# Patient Record
Sex: FEMALE | Race: WHITE | HISPANIC OR LATINO | Employment: PART TIME | URBAN - METROPOLITAN AREA
[De-identification: names, ages, dates, MRNs, and addresses within clinical notes are randomized per-mention and may not be internally consistent; named-entity substitution may affect disease eponyms.]

---

## 2017-02-10 ENCOUNTER — ALLSCRIPTS OFFICE VISIT (OUTPATIENT)
Dept: OTHER | Facility: OTHER | Age: 62
End: 2017-02-10

## 2017-02-10 DIAGNOSIS — M54.50 LOW BACK PAIN: ICD-10-CM

## 2017-04-14 ENCOUNTER — ALLSCRIPTS OFFICE VISIT (OUTPATIENT)
Dept: OTHER | Facility: OTHER | Age: 62
End: 2017-04-14

## 2017-04-14 ENCOUNTER — APPOINTMENT (OUTPATIENT)
Dept: LAB | Facility: CLINIC | Age: 62
End: 2017-04-14
Payer: COMMERCIAL

## 2017-04-14 DIAGNOSIS — R15.9 FULL INCONTINENCE OF FECES: ICD-10-CM

## 2017-04-14 LAB
ALBUMIN SERPL BCP-MCNC: 3.7 G/DL (ref 3.5–5)
ALP SERPL-CCNC: 90 U/L (ref 46–116)
ALT SERPL W P-5'-P-CCNC: 23 U/L (ref 12–78)
ANION GAP SERPL CALCULATED.3IONS-SCNC: 7 MMOL/L (ref 4–13)
AST SERPL W P-5'-P-CCNC: 14 U/L (ref 5–45)
BILIRUB SERPL-MCNC: 0.44 MG/DL (ref 0.2–1)
BUN SERPL-MCNC: 25 MG/DL (ref 5–25)
CALCIUM SERPL-MCNC: 9.1 MG/DL (ref 8.3–10.1)
CHLORIDE SERPL-SCNC: 106 MMOL/L (ref 100–108)
CO2 SERPL-SCNC: 27 MMOL/L (ref 21–32)
CREAT SERPL-MCNC: 0.67 MG/DL (ref 0.6–1.3)
ERYTHROCYTE [DISTWIDTH] IN BLOOD BY AUTOMATED COUNT: 13.4 % (ref 11.6–15.1)
GFR SERPL CREATININE-BSD FRML MDRD: >60 ML/MIN/1.73SQ M
GLUCOSE P FAST SERPL-MCNC: 97 MG/DL (ref 65–99)
HCT VFR BLD AUTO: 41.1 % (ref 34.8–46.1)
HGB BLD-MCNC: 13.2 G/DL (ref 11.5–15.4)
MCH RBC QN AUTO: 28.3 PG (ref 26.8–34.3)
MCHC RBC AUTO-ENTMCNC: 32.1 G/DL (ref 31.4–37.4)
MCV RBC AUTO: 88 FL (ref 82–98)
PLATELET # BLD AUTO: 280 THOUSANDS/UL (ref 149–390)
PMV BLD AUTO: 11 FL (ref 8.9–12.7)
POTASSIUM SERPL-SCNC: 3.8 MMOL/L (ref 3.5–5.3)
PROT SERPL-MCNC: 7.7 G/DL (ref 6.4–8.2)
RBC # BLD AUTO: 4.66 MILLION/UL (ref 3.81–5.12)
SODIUM SERPL-SCNC: 140 MMOL/L (ref 136–145)
WBC # BLD AUTO: 4.73 THOUSAND/UL (ref 4.31–10.16)

## 2017-04-14 PROCEDURE — 85027 COMPLETE CBC AUTOMATED: CPT

## 2017-04-14 PROCEDURE — 80053 COMPREHEN METABOLIC PANEL: CPT

## 2017-04-24 ENCOUNTER — GENERIC CONVERSION - ENCOUNTER (OUTPATIENT)
Dept: OTHER | Facility: OTHER | Age: 62
End: 2017-04-24

## 2017-05-11 ENCOUNTER — ANESTHESIA EVENT (OUTPATIENT)
Dept: GASTROENTEROLOGY | Facility: HOSPITAL | Age: 62
End: 2017-05-11
Payer: COMMERCIAL

## 2017-05-11 ENCOUNTER — HOSPITAL ENCOUNTER (OUTPATIENT)
Facility: HOSPITAL | Age: 62
Setting detail: OUTPATIENT SURGERY
Discharge: HOME | End: 2017-05-11
Attending: INTERNAL MEDICINE | Admitting: INTERNAL MEDICINE
Payer: COMMERCIAL

## 2017-05-11 ENCOUNTER — GENERIC CONVERSION - ENCOUNTER (OUTPATIENT)
Dept: OTHER | Facility: OTHER | Age: 62
End: 2017-05-11

## 2017-05-11 ENCOUNTER — ANESTHESIA (OUTPATIENT)
Dept: GASTROENTEROLOGY | Facility: HOSPITAL | Age: 62
End: 2017-05-11
Payer: COMMERCIAL

## 2017-05-11 VITALS
HEART RATE: 68 BPM | BODY MASS INDEX: 22.99 KG/M2 | TEMPERATURE: 98.2 F | DIASTOLIC BLOOD PRESSURE: 77 MMHG | OXYGEN SATURATION: 100 % | WEIGHT: 138 LBS | RESPIRATION RATE: 16 BRPM | HEIGHT: 65 IN | SYSTOLIC BLOOD PRESSURE: 132 MMHG

## 2017-05-11 RX ORDER — PROPOFOL 10 MG/ML
INJECTION, EMULSION INTRAVENOUS CONTINUOUS PRN
Status: DISCONTINUED | OUTPATIENT
Start: 2017-05-11 | End: 2017-05-11 | Stop reason: SURG

## 2017-05-11 RX ORDER — PROPOFOL 10 MG/ML
INJECTION, EMULSION INTRAVENOUS AS NEEDED
Status: DISCONTINUED | OUTPATIENT
Start: 2017-05-11 | End: 2017-05-11 | Stop reason: SURG

## 2017-05-11 RX ORDER — SODIUM CHLORIDE 9 MG/ML
125 INJECTION, SOLUTION INTRAVENOUS CONTINUOUS
Status: DISCONTINUED | OUTPATIENT
Start: 2017-05-11 | End: 2017-05-11 | Stop reason: HOSPADM

## 2017-05-11 RX ADMIN — PROPOFOL 100 MG: 10 INJECTION, EMULSION INTRAVENOUS at 14:10

## 2017-05-11 RX ADMIN — PROPOFOL 100 MCG/KG/MIN: 10 INJECTION, EMULSION INTRAVENOUS at 14:10

## 2017-05-11 RX ADMIN — SODIUM CHLORIDE 125 ML/HR: 0.9 INJECTION, SOLUTION INTRAVENOUS at 12:58

## 2017-06-08 ENCOUNTER — GENERIC CONVERSION - ENCOUNTER (OUTPATIENT)
Dept: OTHER | Facility: OTHER | Age: 62
End: 2017-06-08

## 2018-01-10 NOTE — MISCELLANEOUS
Reason For Visit  Reason For Visit Free Text Note Form: Assistance with Obtaining Insurance     Case Management Documentation [facility]:   Information obtained from the patient and medical record  She is also dealing with additional issues such as chronic/terminal disease  Action Plan: supportive counseling/advocacy, information provided and Mediciane Program/Financial Counselor  plan reviewed  Progress Note  MARILEE met with this bi lingual female pt this date to assist pt with obtaining insurance  Pt relates she works but this is more limited now due other health issuers  Pt had had several ED visits and has been referred PATHS  MARILEE has assisted pt with a call to [employer] and pt does need to complete application  MARILEE has provided pt with directions and required info  Marilee encouraged pt to go to [facility] for assistance in competing same  Marilee has also referred pt to [facility] who is already trying to assist pt with medications from the [facility]  Pt also referred to [facility] for [facility] Assistance  MARILEE also spoke with pt re her Hx of depression and panic attacks  Pt reports she feels she is doing better at this time  Pt has been given the [facility] # as well as a resource for OP [address] [place] through the [facility]  Unfortunately, pt will not be able to get funding for Cosme Shaun Landa in [address] is to f/u with this SWer if additional assistance is needed  Active Problems    1  Asthma (493 90) (J45 909)   2  Bowel incontinence (787 60) (R15 9)   3  Depression (311) (F32 9)   4  Encounter for screening colonoscopy (V76 51) (Z12 11)   5  Hip pain (719 45) (M25 559)   6  Lumbar radiculopathy (724 4) (M54 16)   7  Onychomycosis (110 1) (B35 1)   8  Panic disorder (300 01) (F41 0)   9  Sciatica (724 3) (M54 30)   10  Seasonal allergies (477 9) (J30 2)   11  Shortness of breath (786 05) (R06 02)   12  Viral URI (465 9) (J06 9,B97 89)    Current Meds   1   Acetaminophen-Codeine #3 300-30 MG Oral Tablet; TAKE 1 TABLET TWICE DAILY AS   NEEDED FOR PAIN;   Therapy: 69Mqo7916 to Recorded   2  Cyclobenzaprine HCl - 10 MG Oral Tablet; TAKE 1 TABLET AT BEDTIME AS NEEDED; Therapy: 36SAG8384 to (Evaluate:30Jun2016)  Requested for: 11VWA4490 Recorded   3  Flovent  MCG/ACT Inhalation Aerosol; INHALE 1 PUFF TWICE DAILY; Therapy: 20Sff4725 to (Last Rx:63Ddo0603)  Requested for: 27Fye0902 Ordered   4  Loratadine 10 MG Oral Tablet; TAKE 1 TABLET DAILY; Therapy: 48WAM7055 to (Jessica Silverman)  Requested for: 81SAA3273; Last   Rx:11Mar2016 Ordered   5  Naproxen 375 MG Oral Tablet; TAKE 1 TABLET EVERY 12 HOURS AS NEEDED; Therapy: 20Dqd2893 to (Evaluate:61Fkn1429)  Requested for: 92Xlr1630; Last   Rx:60Glx6651 Ordered   6  PARoxetine HCl - 20 MG Oral Tablet; TAKE 1 AND 1/2 TABLETS DAILY IN AM;   Therapy: 54BPA6308 to (Evaluate:14Jan2017)  Requested for: 58RTS8646; Last   Rx:22Rho4370 Ordered   7  Terbinafine HCl - 250 MG Oral Tablet; TAKE 1 TABLET DAILY; Therapy: 01Rjf7576 to (Evaluate:21Oct2016)  Requested for: 23Jjo2095; Last   Rx:20Ooo9256 Ordered   8  TraMADol HCl - 50 MG Oral Tablet; take 50 mg daily; Therapy: 09Zhq4939 to (Evaluate:24Wla5840); Last Rx:71Tvf8713 Ordered    Allergies    1  No Known Drug Allergies    Future Appointments    Date/Time Provider Specialty Site   09/23/2016 09:15 AM GLADIS Garcias   Internal Medicine [address] [place] [address] PCP     Signatures   Electronically signed by : Evita Cruz LCSW; Sep 12 2016  1:16PM EST                       (Author)

## 2018-01-12 VITALS
TEMPERATURE: 97.3 F | DIASTOLIC BLOOD PRESSURE: 72 MMHG | HEART RATE: 72 BPM | WEIGHT: 138.89 LBS | HEIGHT: 65 IN | SYSTOLIC BLOOD PRESSURE: 112 MMHG | BODY MASS INDEX: 23.14 KG/M2

## 2018-01-14 VITALS
TEMPERATURE: 97.7 F | HEART RATE: 64 BPM | WEIGHT: 140.65 LBS | SYSTOLIC BLOOD PRESSURE: 108 MMHG | DIASTOLIC BLOOD PRESSURE: 74 MMHG | HEIGHT: 65 IN | BODY MASS INDEX: 23.43 KG/M2

## 2018-01-14 NOTE — RESULT NOTES
Verified Results  (1) COMPREHENSIVE METABOLIC PANEL 52TXB2735 30:63JR Laura Andrade Order Number: [id number]    TW Order Number: [id number] Order Number: [id number]  National Kidney Disease Education Program recommendations are as follows:  GFR calculation is accurate only with a steady state creatinine  Chronic Kidney disease less than 60 ml/min/1 73 sq  meters  Kidney failure less than 15 ml/min/1 73 sq  meters  Test Name Result Flag Reference   GLUCOSE,RANDM 97 mg/dL     If the patient is fasting, the ADA then defines impaired fasting glucose as > 100 mg/dL and diabetes as > or equal to 123 mg/dL     SODIUM 138 mmol/L  136-145   POTASSIUM 4 0 mmol/L  3 5-5 3   CHLORIDE 104 mmol/L  100-108   CARBON DIOXIDE 31 mmol/L  21-32   ANION GAP (CALC) 3 mmol/L L 4-13   BLOOD UREA NITROGEN 22 mg/dL  5-25   CREATININE 0 65 mg/dL  0 60-1 30   Standardized to IDMS reference method   CALCIUM 8 1 mg/dL L 8 3-10 1   BILI, TOTAL 0 33 mg/dL  0 20-1 00   ALK PHOSPHATAS 98 U/L     ALT (SGPT) 34 U/L  12-78   AST(SGOT) 12 U/L  5-45   ALBUMIN 3 6 g/dL  3 5-5 0   TOTAL PROTEIN 7 9 g/dL  6 4-8 2   eGFR Non-African American      >60 0 ml/min/1 73sq m

## 2018-01-15 NOTE — PROGRESS NOTES
Assessment    1  Depression (311) (F32 9)   2  Sciatica (724 3) (M54 30)   3  Onychomycosis (110 1) (B35 1)   4  Asthma (493 90) (J45 909)    Plan  Asthma    · Flovent  MCG/ACT Inhalation Aerosol; INHALE 1 PUFF TWICE DAILY   · Follow-up visit in 1 month Evaluation and Treatment  Follow-up  Status: Hold For - Scheduling   Requested for: 99Yvg9212  Onychomycosis    · Terbinafine HCl - 250 MG Oral Tablet; TAKE 1 TABLET DAILY  Sciatica    · Naproxen 375 MG Oral Tablet; TAKE 1 TABLET EVERY 12 HOURS AS NEEDED   · TraMADol HCl - 50 MG Oral Tablet; take 50 mg daily   · *1 - [address] Physician Referral  Consult  Status: Active  Requested for:  19Wcz6641  Care Summary provided  : Yes    Discussion/Summary  Discussion Summary:   1  Depression   Patient is adherent to medication and states that her symptoms have improved over the past month  She does complain of difficulty sleeping which is likely secondary to her sciatic pain and not her depression  Will focus on controlling her sciatic pain which will likely help to alleviate her depressive symptoms as well  2  Sciatica   Patient has a history of sciatica for which she takes 50mg Tramadol (prescibed by PT) daily for her pain as well as Cyclobenzaprine  She states that the daily pain management works for the day and helps reduce her pain to a 4-5/10 from an 8/10 though she does not have any pain coverage in the evening which has made her sleeping difficult  She sees PT weekly for her pain and states that this does help, though she does not feel her pain is well controlled at the moment  She has also been told by pain management that she can no longer receive injections in her back for her pain  Will send her to [facility] and Pain management, will add naproxen and reorder 30 day supply of tramadol 50mg daily  3  Onychomycosis   Patient has fungal infection of the right 1-3 toes as well as the left 1st toe   Will order terbutaline 250mg for 6 weeks for management  4  Asthma  Patient complains of daily evening cough requiring frequent albuterol use  Physical examination shows clear lungs bilaterally to auscultation, but patient took her albuterol his morning  She 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, though at home she denies any dust, smoking, or pet exposure  She likely is now in the moderate persistent category for asthma and would benefit from the addition of inhaled corticosteroid and will task [facility] for medication and reassessment in 4 weeks  Counseling Documentation With Imm: The patient was counseled regarding instructions for management, risk factor reductions, impressions, importance of compliance with treatment  Immunization Counseling The parent/guardian was counseled on the following vaccine components:  total time of encounter was 35 minutes  Medication SE Review and Pt Understands Tx: Possible side effects of new medications were reviewed with the patient/guardian today  Chief Complaint  Chief Complaint Free Text Note Form: Patient is here for follow up of her chronic medical conditions   Chief Complaint Chronic Condition [facility]: Patient is here today for follow up of chronic conditions described in HPI  History of Present Illness  HPI: Ms Harini Paredes is a 61year old female here in clinic today for follow up of her depression over the past month as well as hip pain that radiates down her right leg to her toes, especially when leaning forward  She states that this pain is strong and affects her throughout the day where she needs to take Tramadol to help with her pain  She states that the pain limits her ability to work as well as limits her ability to sleep comfortably  Hospital Based Practices Required Assessment:   Pain Assessment   the patient states they have pain  The pain is located in the lower back  The pain radiates to the down her right leg to her toes   The patient describes the pain as sharp  (on a scale of 0 to 10, the patient rates the pain at 8 )   Abuse And Domestic Violence Screen    Yes, the patient is safe at home  The patient states no one is hurting them  Depression And Suicide Screen  No, the patient has not had thoughts of hurting themself  Yes, the patient has felt depressed in the past 7 days  Prefered Language is  Uzbek  Primary Language is  Uzbek  Depression (Initial):     Depression (Follow-Up): The patient states her depression has been stable since the last visit  She describes this as mild  Comorbid Illnesses: anxiety  Interval Symptoms: denies depression, stable depressed mood, stable loss of interest or pleasure in activities, denies insomnia, denies excessive sleepiness, denies inability to perform normal activities, denies loss of energy, denies feelings of worthlessness and denies feelings of guilt  Associated symptoms include:  No associated symptoms are reported  Social Support: Sister whom lives outside [facility], [address] [place] [address] is limited secondary to sciatica, but the patient has good social support  Medications: the patient is adherent with her medication regimen  Sciatica (Brief):     Sciatica, Chronic (Brief): The patient is being seen for a routine clinic follow-up of chronic sciatica  The injury involved the lumbosacral area  This condition is Fall  This occurred at home  The injury resulted from a fall  Symptoms:  back pain, lower leg pain, leg numbness, paresthesias and back stiffness, but no buttock pain and no leg weakness  Associated symptoms:  no sexual dysfunction, no rash localized to the area of pain and no fever  Current treatment includes prescribed exercises  By report, there is fair compliance with treatment, fair tolerance of treatment and poor symptom control  (Patient has been seen by PT whom she sees 1-2 times per week whom give her exercises as well as warm/cold patches for pain   She states that this helps on the day of PT  She also takes 50mg Tramadol daily which PT was prescribing as well as 10mg Cyclobenzaprine for muscle relaxation  MRI from April 2016 showed mild disc degeneration in lumbar spine as well as some areas of disc protrusion in the lumbar spine  She was receiving disc injections in the past for which she was told she is no longer a candidate for)   Allergy Evaluation (Brief): Asthma (Follow-Up): The patient is being seen for a routine clinic follow-up of asthma  The patient's long-term asthma pattern has been classified as moderate persistent  Interval Events: Patient complains of SOB and coughing every evening which requires albterol every day  Interval Symptoms:   worsened wheezing, worsened coughing, denies chest tightness and denies reduced exercise tolerance  Associated symptoms include awakened at night because of cough, but no chest pain or discomfort and not awakened at night because of wheezing or trouble breathing  More frequent rescue inhaler use of 2-3 time(s) a day  Lifestyle: Diet: She consumes a diverse and healthy diet  Weight Issues: She does not have any weight concerns  Exercise: She exercises regularly  Smoking: She does not use tobacco Alcohol: She denies alcohol use  Interval Triggers: None  Review of Systems  Complete-Female:   Constitutional: No fever, no chills, feels well, no tiredness, no recent weight gain or weight loss  Eyes: No complaints of eye pain, no red eyes, no eyesight problems, no discharge, no dry eyes, no itching of eyes  Cardiovascular: No complaints of slow heart rate, no fast heart rate, no chest pain, no palpitations, no leg claudication, no lower extremity edema  Active Problems    1  Asthma (493 90) (J45 909)   2  Bowel incontinence (787 60) (R15 9)   3  Depression (311) (F32 9)   4  Encounter for screening colonoscopy (V76 51) (Z12 11)   5  Hip pain (719 45) (M25 559)   6  Lumbar radiculopathy (724 4) (M54 16)   7   Panic disorder (300 01) (F41 0) 8  Sciatica (724 3) (M54 30)   9  Seasonal allergies (477 9) (J30 2)   10  Shortness of breath (786 05) (R06 02)   11  Viral URI (465 9) (J06 9,B97 89)    Past Medical History    1  History of breast implant (V43 82) (M91 40)  Active Problems And Past Medical History Reviewed: The active problems and past medical history were reviewed and updated today  Surgical History    1  History of Breast Surgery Enlargement Procedure  Surgical History Reviewed: The surgical history was reviewed and updated today  Family History  Mother    1  Family history of Lung cancer  Sister    2  Family history of Asthma  Family History Reviewed: The family history was reviewed and updated today  Social History    · Financial status inadequate or marginal   · Never a smoker   · Single  Social History Reviewed: The social history was reviewed and updated today  The social history was reviewed and is unchanged  Current Meds   1  Acetaminophen-Codeine #3 300-30 MG Oral Tablet; TAKE 1 TABLET TWICE DAILY AS NEEDED FOR   PAIN;   Therapy: 20Iku1070 to Recorded   2  Cyclobenzaprine HCl - 10 MG Oral Tablet; TAKE 1 TABLET AT BEDTIME AS NEEDED; Therapy: 55FMS7832 to (Evaluate:30Jun2016)  Requested for: 96NCR5840 Recorded   3  Loratadine 10 MG Oral Tablet; TAKE 1 TABLET DAILY; Therapy: 39KLX6970 to (Leonardo Tom)  Requested for: 51GBW9490; Last Rx:11Mar2016   Ordered   4  PARoxetine HCl - 20 MG Oral Tablet; TAKE 1 AND 1/2 TABLETS DAILY IN AM;   Therapy: 22AKU6939 to (Evaluate:14Jan2017)  Requested for: 97GDN2840; Last Rx:30Lyl8661   Ordered  Medication List Reviewed: The medication list was reviewed and updated today  Allergies    1   No Known Drug Allergies    Vitals  Vital Signs    Recorded: 21HNJ7500 90:86EV   Systolic 748   Diastolic 80   Heart Rate 74   Temperature 98 1 F   Height 5 ft 4 in   Weight 136 lb 10 94 oz   BMI Calculated 23 46   BSA Calculated 1 66     Physical Exam    Constitutional General appearance: No acute distress, well appearing and well nourished  Eyes   Conjunctiva and lids: No swelling, erythema or discharge  Pupils and irises: Equal, round and reactive to light  Pulmonary   Respiratory effort: No increased work of breathing or signs of respiratory distress  Auscultation of lungs: Clear to auscultation  clear to auscultation bilaterally  Cardiovascular   Palpation of heart: Normal PMI, no thrills  Auscultation of heart: Normal rate and rhythm, normal S1 and S2, without murmurs  Abdomen   Abdomen: Non-tender, no masses  Lymphatic   Palpation of lymph nodes in neck: No lymphadenopathy  Musculoskeletal   Gait and station: Abnormal   Gait evaluation demonstrated Limited gait secondary to lower back/sciatic pain  Skin   Skin and subcutaneous tissue: Normal without rashes or lesions  Neurologic   Sensation: No sensory loss  Attending Note  Attending Note: Attending Note: I interviewed and examined the patient, I discussed the case with the Resident and reviewed the Resident's note, I supervised the Resident and I agree with the Resident management plan as it was presented to me  Level of Participation: I was present in clinic and examined the patient  Patient's History:   Agree with above  Has chronic R back/sciatica, into distal R foot  Wanted PM referral as spinal injections very helpful in past Her asthma is poorly controlled with daily JYOTSNA use and nocturnal sx  Depression is improved  Has a new job  Key Parts of the Exam:   Alert, appears comfortable  She had pain in R foot with extension of her R leg while seated  COR-RR@ 80/min  LUNG-clear  Ext- onychomycosis of all toes  Diagnosis and Plan:   As noted  Has $ barrier to care, but the terbinafine is on $[facility]  We will have her see Moni for assist on the Flovent   Refer to [facility] as her current provider would not give her any more injections and she wanted another look at this        Signatures   Electronically signed by : GLADIS Candelaria ; Aug 22 2016 11:38AM EST                       (Author)    Electronically signed by : Madelaine Boeck, DO; Aug 22 2016 11:57AM EST                       (Co-participant)

## 2018-01-15 NOTE — MISCELLANEOUS
Message  Patient called regarding blood work and onychomycosis  Recent CMP and CBC was with in normal limits  Terbinafine was prescribed in the past but only for 8 weeks  Usual duration is of 12 weeks  Patient is not having any side effects  Will Rx one more month of Terbinafine       EILEEN Newsome      Plan  Onychomycosis    · Terbinafine HCl - 250 MG Oral Tablet; TAKE 1 TABLET DAILY    Signatures   Electronically signed by : EILEEN Newsome DO; May  5 2017 12:17PM EST                       (Author)

## 2018-01-16 NOTE — PROGRESS NOTES
Assessment    1  Shortness of breath (786 05) (R06 02)   2  Panic disorder (300 01) (F41 0)    Plan  Panic disorder    · Escitalopram Oxalate 5 MG Oral Tablet (Lexapro); TAKE 1 TABLET DAILY   · Follow-up Visit in 4 Weeks Evaluation and Treatment  Follow-up  Status: Hold For - Scheduling   Requested for: 11XMH0947  Shortness of breath    · EKG/ECG- POC; Status:Active; Requested for:73Qny7530;     Discussion/Summary  Discussion Summary:   1  Panic Disorder - EKG in office shows NSR without signs of ischemia  Will start lexapro 5g daily and f/u in 4 weeks for further titration of dosage  I educated the patient regarding the condition and the likely time period of a few weeks before seeing the benefits of the SSRI dosage  Chief Complaint  Chief Complaint Free Text Note Form: shortness of breath      History of Present Illness  HPI: 3-6 weeks ago, she began to start feeling SOB/difficulty catching her breath/intense sense of worry that she can't breath  Happens when lying down or working  She says it happens daily  Relates it to being on tramadol, so she stopped taking it feeling it caused her to feel "hyper"  They last for roughly 2-3 minutes  She normally can feel it coming on and then resolves by itself suddenly  She also feels palpitations with this  This occasionally does wake her from her sleep roughly 3x per night  She had these symptoms previously 2 years ago which was self-limited and did not have it evaluated  Hospital Based Practices Required Assessment:   Pain Assessment   the patient states they do not have pain  (on a scale of 0 to 10, the patient rates the pain at 0 )    Depression And Suicide Screen  No, the patient has not had thoughts of hurting themself  No, the patient has not felt depressed in the past 7 days  Prefered Language is  Mauritian  Primary Language is  Mauritian  Review of Systems  Complete-Female:   Constitutional: no fever and no chills     Eyes: no eyesight problems and no purulent discharge from the eyes  ENT: no hearing loss and no nasal discharge  Cardiovascular: palpitations, but as noted in HPI and no chest pain  Respiratory: shortness of breath, but as noted in HPI and no cough  Gastrointestinal: no nausea and no vomiting  Musculoskeletal: no arthralgias and no myalgias  Integumentary: no rashes and no skin lesions  Neurological: no numbness and no tingling  Psychiatric: no anxiety and no depression  Active Problems    1  Asthma (493 90) (J45 909)   2  Hip pain (719 45) (M25 559)   3  Lumbar radiculopathy (724 4) (M54 16)   4  Sciatica (724 3) (M54 30)    Past Medical History    1  History of breast implant (V43 82) (T48 36)  Active Problems And Past Medical History Reviewed: The active problems and past medical history were reviewed and updated today  Surgical History    1  History of Breast Surgery Enlargement Procedure  Surgical History Reviewed: The surgical history was reviewed and updated today  Family History    1  Family history of Lung cancer    2  Family history of Asthma  Family History Reviewed: The family history was reviewed and updated today  Social History    · Never a smoker  Social History Reviewed: The social history was reviewed and updated today  The social history was reviewed and is unchanged  Current Meds   1  Cyclobenzaprine HCl - 10 MG Oral Tablet; TAKE 1 TABLET AT BEDTIME AS NEEDED; Therapy: 81BDB8535 to (Evaluate:72Ebk0330)  Requested for: 86BCH9365 Recorded  Medication List Reviewed: The medication list was reviewed and updated today  Allergies    1   No Known Drug Allergies    Vitals  Vital Signs [Data Includes: Current Encounter]    Recorded: 51LOI1952 12:12PM   Temperature 98 F   Heart Rate 80   Systolic 739   Diastolic 72   Height 5 ft 4 in   Weight 138 lb 14 22 oz   BMI Calculated 23 84   BSA Calculated 1 68     Physical Exam    Constitutional   General appearance: No acute distress, well appearing and well nourished  Eyes   Conjunctiva and lids: No swelling, erythema or discharge  Pupils and irises: Equal, round and reactive to light  Ears, Nose, Mouth, and Throat   Oropharynx: Normal with no erythema, edema, exudate or lesions  Pulmonary   Respiratory effort: No increased work of breathing or signs of respiratory distress  Auscultation of lungs: Clear to auscultation  Cardiovascular   Palpation of heart: Normal PMI, no thrills  Auscultation of heart: Normal rate and rhythm, normal S1 and S2, without murmurs  Examination of extremities for edema and/or varicosities: Normal     Neurologic   Cranial nerves: Cranial nerves 2-12 intact  Sensation: No sensory loss  Psychiatric   Orientation to person, place, and time: Normal     Mood and affect: Normal          Attending Note  Attending Note: Attending Note: I discussed the case with the Resident and reviewed the Resident's note, I supervised the Resident and I agree with the Resident management plan as it was presented to me  Level of Participation: I was present in clinic, but did not examine the patient  Comments/Additional Findings: Will address HM issues next time  I agree with the Resident's note        Signatures   Electronically signed by : Annamarie German DO; Feb 1 2016  1:03PM EST                       (Author)    Electronically signed by : Jonel Hernández DO; Feb 1 2016  1:10PM EST                       (Co-author)

## 2018-04-26 ENCOUNTER — APPOINTMENT (EMERGENCY)
Dept: RADIOLOGY | Facility: HOSPITAL | Age: 63
End: 2018-04-26

## 2018-04-26 ENCOUNTER — HOSPITAL ENCOUNTER (EMERGENCY)
Facility: HOSPITAL | Age: 63
Discharge: HOME | End: 2018-04-26
Attending: EMERGENCY MEDICINE | Admitting: EMERGENCY MEDICINE

## 2018-04-26 VITALS
TEMPERATURE: 98.5 F | WEIGHT: 140 LBS | BODY MASS INDEX: 23.3 KG/M2 | DIASTOLIC BLOOD PRESSURE: 93 MMHG | HEART RATE: 81 BPM | RESPIRATION RATE: 16 BRPM | OXYGEN SATURATION: 100 % | SYSTOLIC BLOOD PRESSURE: 154 MMHG

## 2018-04-26 DIAGNOSIS — S99.912A INJURY OF LEFT ANKLE, INITIAL ENCOUNTER: Primary | ICD-10-CM

## 2018-04-26 PROCEDURE — 73630 X-RAY EXAM OF FOOT: CPT

## 2018-04-26 PROCEDURE — 99283 EMERGENCY DEPT VISIT LOW MDM: CPT

## 2018-04-26 PROCEDURE — 73610 X-RAY EXAM OF ANKLE: CPT

## 2018-04-26 RX ORDER — IBUPROFEN 600 MG/1
600 TABLET ORAL ONCE
Status: COMPLETED | OUTPATIENT
Start: 2018-04-26 | End: 2018-04-26

## 2018-04-26 RX ORDER — NAPROXEN 500 MG/1
500 TABLET ORAL 2 TIMES DAILY WITH MEALS
Qty: 30 TABLET | Refills: 0 | Status: SHIPPED | OUTPATIENT
Start: 2018-04-26 | End: 2018-07-19

## 2018-04-26 RX ADMIN — IBUPROFEN 600 MG: 600 TABLET ORAL at 21:16

## 2018-04-27 NOTE — ED PROVIDER NOTES
History  Chief Complaint   Patient presents with    Ankle Injury     pt states she rolled her ankle around 1400 today, has been bearing weight at work but states the pain has gotten worse     59-year-old female presenting to the ER today with a chief complaint of right ankle pain  History provided by:  Patient   used: No    Ankle Injury   Location:  Right ankle   Quality:  Ache   Severity:  Mild  Onset quality:  Gradual  Timing:  Constant  Progression:  Worsening  Chronicity:  New  Context:  Twisted while walking at home   Relieved by:  Rest   Worsened by:  Ambulation and weight-bearing  Associated symptoms: myalgias    Associated symptoms: no abdominal pain, no chest pain, no congestion, no cough, no diarrhea, no ear pain, no fatigue, no fever, no headaches, no loss of consciousness, no nausea, no rash, no rhinorrhea, no shortness of breath, no sore throat, no vomiting and no wheezing        Prior to Admission Medications   Prescriptions Last Dose Informant Patient Reported? Taking? PARoxetine (PAXIL) 10 mg tablet   Yes No   Sig: Take 10 mg by mouth   PARoxetine (PAXIL) 30 mg tablet   Yes No   Sig: Take 1 tablet by mouth daily   PEG 3350-KCl-NaBcb-NaCl-NaSulf (GOLYTELY) 227 1 g PACK   Yes No   Sig: Take by mouth   PREDNISONE, HANNA, PO   Yes No   Sig: Take by mouth Tapering   TRAMADOL HCL PO   Yes No   Sig: Take by mouth Takes daily   acetaminophen-codeine (TYLENOL WITH CODEINE) 120-12 mg/5 mL suspension   Yes No   Sig: Take 5 mL by mouth   albuterol (PROVENTIL HFA,VENTOLIN HFA) 90 mcg/act inhaler   Yes No   Sig: Inhale 2 puffs every 6 (six) hours as needed for wheezing   amitriptyline (ELAVIL) 25 mg tablet   Yes No   Sig: Take 1 tablet by mouth   bisacodyl (DULCOLAX) 5 mg EC tablet   Yes No   Sig: Take by mouth   cetirizine-pseudoephedrine (ZyrTEC-D) 5-120 MG per tablet   No No   Sig: Take 1 tablet by mouth 2 (two) times a day     cyclobenzaprine (FLEXERIL) 10 mg tablet   Yes No   Sig: Take 1 tablet by mouth   docusate sodium (COLACE) 100 mg capsule   Yes No   Sig: Take 1 capsule by mouth 2 (two) times a day as needed   fluticasone (FLOVENT HFA) 110 MCG/ACT inhaler   Yes No   Sig: Inhale 1 puff 2 (two) times a day   loratadine (CLARITIN) 10 mg tablet   Yes No   Sig: Take 1 tablet by mouth daily   naproxen (NAPROSYN) 500 mg tablet   No No   Sig: Take 1 tablet (500 mg total) by mouth 2 (two) times a day with meals  polyethylene glycol (GLYCOLAX) powder   Yes No   Sig: Take by mouth   polyethylene glycol (MIRALAX) 17 g packet   Yes No   Sig: Take 1 packet by mouth daily   predniSONE 20 mg tablet   No No   Sig: Take 3 tablets by mouth daily   terbinafine (LamISIL) 250 mg tablet   Yes No   Sig: Take 1 tablet by mouth daily      Facility-Administered Medications: None       Past Medical History:   Diagnosis Date    Asthma     Back pain     Bowel incontinence     Chronic pain     History of breast implant     Psychiatric disorder     panic attacks       Past Surgical History:   Procedure Laterality Date    AUGMENTATION MAMMAPLASTY      BREAST SURGERY      lift    VA COLONOSCOPY FLX DX W/COLLJ SPEC WHEN PFRMD N/A 5/11/2017    Procedure: COLONOSCOPY;  Surgeon: Ryley Fernandez MD;  Location: [facility]; Service: Gastroenterology       Family History   Problem Relation Age of Onset    Lung cancer Mother     Asthma Sister      I have reviewed and agree with the history as documented  Social History   Substance Use Topics    Smoking status: Never Smoker    Smokeless tobacco: Never Used    Alcohol use No        Review of Systems   Constitutional: Negative  Negative for appetite change, chills, diaphoresis, fatigue and fever  HENT: Negative  Negative for congestion, ear pain, rhinorrhea and sore throat  Eyes: Negative  Respiratory: Negative  Negative for cough, shortness of breath and wheezing  Cardiovascular: Negative  Negative for chest pain     Gastrointestinal: Negative for abdominal pain, blood in stool, constipation, diarrhea, nausea and vomiting  Endocrine: Negative  Genitourinary: Negative for decreased urine volume, difficulty urinating, dyspareunia, dysuria, flank pain, frequency, hematuria, pelvic pain, urgency, vaginal bleeding, vaginal discharge and vaginal pain  Musculoskeletal: Positive for myalgias  Skin: Negative  Negative for rash  Allergic/Immunologic: Negative  Neurological: Negative  Negative for loss of consciousness and headaches  Psychiatric/Behavioral: Negative  All other systems reviewed and are negative  Physical Exam  ED Triage Vitals [04/26/18 2051]   Temperature Pulse Respirations Blood Pressure SpO2   98 5 °F (36 9 °C) 81 16 154/93 100 %      Temp Source Heart Rate Source Patient Position - Orthostatic VS BP Location FiO2 (%)   Oral -- -- -- --      Pain Score       Worst Possible Pain           Orthostatic Vital Signs  Vitals:    04/26/18 2051   BP: 154/93   Pulse: 81       Physical Exam   Constitutional: She is oriented to person, place, and time  She appears well-developed and well-nourished  HENT:   Head: Normocephalic and atraumatic  Right Ear: External ear normal    Left Ear: External ear normal    Nose: Nose normal    Mouth/Throat: Oropharynx is clear and moist    Eyes: Conjunctivae and EOM are normal  Pupils are equal, round, and reactive to light  Neck: Normal range of motion  Neck supple  No JVD present  No tracheal deviation present  No thyromegaly present  Cardiovascular: Normal rate, regular rhythm, normal heart sounds and intact distal pulses  Exam reveals no gallop and no friction rub  No murmur heard  Pulmonary/Chest: Effort normal and breath sounds normal  No stridor  No respiratory distress  She has no wheezes  She has no rales  She exhibits no tenderness  Abdominal: Soft  Bowel sounds are normal  She exhibits no distension and no mass  There is no tenderness  There is no rebound and no guarding   No hernia  Musculoskeletal: Normal range of motion  She exhibits tenderness  She exhibits no edema or deformity  Left ankle: Tenderness  Lateral malleolus and head of 5th metatarsal tenderness found  Feet:    Lymphadenopathy:     She has no cervical adenopathy  Neurological: She is alert and oriented to person, place, and time  She has normal reflexes  She displays normal reflexes  No cranial nerve deficit  She exhibits normal muscle tone  Coordination normal    Skin: Skin is warm  No rash noted  No erythema  No pallor  Psychiatric: She has a normal mood and affect  Her behavior is normal  Judgment and thought content normal    Nursing note and vitals reviewed  ED Medications  Medications   ibuprofen (MOTRIN) tablet 600 mg (600 mg Oral Given 4/26/18 2116)       Diagnostic Studies  Results Reviewed     None                 XR ankle 3+ views LEFT   Final Result by Obdulio Jansen DO (04/26 2152)      No acute osseous abnormality  Workstation performed: [id number]         XR foot 3+ views LEFT   Final Result by Obdulio Jansen DO (04/26 2151)      No acute osseous abnormality  Workstation performed: [id number]               Procedures  Procedures      Phone Consults  ED Phone Contact    ED Course                               MDM  Number of Diagnoses or Management Options  Diagnosis management comments: Assessment:  -ankle pain status post fall  Plan:  -for pain over the base of the 5th metatarsal as well as lateral malleolus    X-ray to rule out fracture, ice, Motrin   -no acute abnormality noted on x-rays will place patient in air cast with crutches  -will give Sports Med follow-up       Amount and/or Complexity of Data Reviewed  Clinical lab tests: ordered and reviewed  Tests in the radiology section of CPT®: ordered and reviewed  Tests in the medicine section of CPT®: reviewed and ordered  Decide to obtain previous medical records or to obtain history from someone other than the patient: yes  Independent visualization of images, tracings, or specimens: yes    Patient Progress  Patient progress: stable    CritCare Time    Disposition  Final diagnoses:   Injury of left ankle, initial encounter     Time reflects when diagnosis was documented in both MDM as applicable and the Disposition within this note     Time User Action Codes Description Comment    4/26/2018  9:57 PM Jose Ware Add [G44 [id number]] Injury of left ankle, initial encounter       ED Disposition     ED Disposition Condition Comment    Discharge  Yaniv Sanchez discharge to home/self care  Condition at discharge: Good        Follow-up Information     Follow up With Specialties Details Why Contact Info    [employer]  Schedule an appointment as soon as possible for a visit  [phone]          Patient's Medications   Discharge Prescriptions    NAPROXEN (NAPROSYN) 500 MG TABLET    Take 1 tablet (500 mg total) by mouth 2 (two) times a day with meals       Start Date: 4/26/2018 End Date: --       Order Dose: 500 mg       Quantity: 30 tablet    Refills: 0     No discharge procedures on file  ED Provider  Attending physically available and evaluated Yaniv Sanchez I managed the patient along with the ED Attending      Electronically Signed by         Ozzy Fraser DO  04/26/18 4708

## 2018-07-19 ENCOUNTER — APPOINTMENT (EMERGENCY)
Dept: RADIOLOGY | Facility: HOSPITAL | Age: 63
End: 2018-07-19

## 2018-07-19 ENCOUNTER — HOSPITAL ENCOUNTER (EMERGENCY)
Facility: HOSPITAL | Age: 63
Discharge: HOME | End: 2018-07-19
Attending: EMERGENCY MEDICINE | Admitting: EMERGENCY MEDICINE

## 2018-07-19 VITALS
TEMPERATURE: 98.3 F | DIASTOLIC BLOOD PRESSURE: 82 MMHG | SYSTOLIC BLOOD PRESSURE: 137 MMHG | OXYGEN SATURATION: 96 % | RESPIRATION RATE: 18 BRPM | HEART RATE: 70 BPM

## 2018-07-19 DIAGNOSIS — M54.6 ACUTE THORACIC BACK PAIN, UNSPECIFIED BACK PAIN LATERALITY: ICD-10-CM

## 2018-07-19 DIAGNOSIS — R51.9 NONINTRACTABLE HEADACHE, UNSPECIFIED CHRONICITY PATTERN, UNSPECIFIED HEADACHE TYPE: Primary | ICD-10-CM

## 2018-07-19 DIAGNOSIS — N39.0 UTI (URINARY TRACT INFECTION): ICD-10-CM

## 2018-07-19 LAB
ALBUMIN SERPL BCP-MCNC: 3.7 G/DL (ref 3.5–5)
ALP SERPL-CCNC: 101 U/L (ref 46–116)
ALT SERPL W P-5'-P-CCNC: 24 U/L (ref 12–78)
ANION GAP SERPL CALCULATED.3IONS-SCNC: 4 MMOL/L (ref 4–13)
AST SERPL W P-5'-P-CCNC: 17 U/L (ref 5–45)
ATRIAL RATE: 76 BPM
BACTERIA UR QL AUTO: ABNORMAL /HPF
BASOPHILS # BLD AUTO: 0.03 THOUSANDS/ΜL (ref 0–0.1)
BASOPHILS NFR BLD AUTO: 0 % (ref 0–1)
BILIRUB SERPL-MCNC: 0.29 MG/DL (ref 0.2–1)
BILIRUB UR QL STRIP: NEGATIVE
BILIRUB UR QL STRIP: NEGATIVE
BUN SERPL-MCNC: 16 MG/DL (ref 5–25)
CALCIUM SERPL-MCNC: 8.8 MG/DL (ref 8.3–10.1)
CHLORIDE SERPL-SCNC: 107 MMOL/L (ref 100–108)
CK SERPL-CCNC: 93 U/L (ref 26–192)
CLARITY UR: CLEAR
CLARITY UR: CLEAR
CO2 SERPL-SCNC: 27 MMOL/L (ref 21–32)
COLOR UR: YELLOW
COLOR UR: YELLOW
CREAT SERPL-MCNC: 0.82 MG/DL (ref 0.6–1.3)
EOSINOPHIL # BLD AUTO: 0.13 THOUSAND/ΜL (ref 0–0.61)
EOSINOPHIL NFR BLD AUTO: 2 % (ref 0–6)
ERYTHROCYTE [DISTWIDTH] IN BLOOD BY AUTOMATED COUNT: 13 % (ref 11.6–15.1)
GFR SERPL CREATININE-BSD FRML MDRD: 77 ML/MIN/1.73SQ M
GLUCOSE SERPL-MCNC: 113 MG/DL (ref 65–140)
GLUCOSE UR STRIP-MCNC: NEGATIVE MG/DL
GLUCOSE UR STRIP-MCNC: NEGATIVE MG/DL
HCT VFR BLD AUTO: 46.6 % (ref 34.8–46.1)
HGB BLD-MCNC: 14.6 G/DL (ref 11.5–15.4)
HGB UR QL STRIP.AUTO: NEGATIVE
HGB UR QL STRIP.AUTO: NEGATIVE
HYALINE CASTS #/AREA URNS LPF: ABNORMAL /LPF
IMM GRANULOCYTES # BLD AUTO: 0.01 THOUSAND/UL (ref 0–0.2)
IMM GRANULOCYTES NFR BLD AUTO: 0 % (ref 0–2)
KETONES UR STRIP-MCNC: NEGATIVE MG/DL
KETONES UR STRIP-MCNC: NEGATIVE MG/DL
LEUKOCYTE ESTERASE UR QL STRIP: ABNORMAL
LEUKOCYTE ESTERASE UR QL STRIP: ABNORMAL
LYMPHOCYTES # BLD AUTO: 1.31 THOUSANDS/ΜL (ref 0.6–4.47)
LYMPHOCYTES NFR BLD AUTO: 16 % (ref 14–44)
MCH RBC QN AUTO: 28.1 PG (ref 26.8–34.3)
MCHC RBC AUTO-ENTMCNC: 31.3 G/DL (ref 31.4–37.4)
MCV RBC AUTO: 90 FL (ref 82–98)
MONOCYTES # BLD AUTO: 0.45 THOUSAND/ΜL (ref 0.17–1.22)
MONOCYTES NFR BLD AUTO: 5 % (ref 4–12)
NEUTROPHILS # BLD AUTO: 6.5 THOUSANDS/ΜL (ref 1.85–7.62)
NEUTS SEG NFR BLD AUTO: 77 % (ref 43–75)
NITRITE UR QL STRIP: NEGATIVE
NITRITE UR QL STRIP: NEGATIVE
NON-SQ EPI CELLS URNS QL MICRO: ABNORMAL /HPF
NRBC BLD AUTO-RTO: 0 /100 WBCS
P AXIS: 65 DEGREES
PH UR STRIP.AUTO: 7 [PH] (ref 4.5–8)
PH UR STRIP.AUTO: 7 [PH] (ref 4.5–8)
PLATELET # BLD AUTO: 308 THOUSANDS/UL (ref 149–390)
PMV BLD AUTO: 9.8 FL (ref 8.9–12.7)
POTASSIUM SERPL-SCNC: 4.3 MMOL/L (ref 3.5–5.3)
PR INTERVAL: 154 MS
PROT SERPL-MCNC: 7.8 G/DL (ref 6.4–8.2)
PROT UR STRIP-MCNC: NEGATIVE MG/DL
PROT UR STRIP-MCNC: NEGATIVE MG/DL
QRS AXIS: 37 DEGREES
QRSD INTERVAL: 70 MS
QT INTERVAL: 358 MS
QTC INTERVAL: 402 MS
RBC # BLD AUTO: 5.19 MILLION/UL (ref 3.81–5.12)
RBC #/AREA URNS AUTO: ABNORMAL /HPF
SODIUM SERPL-SCNC: 138 MMOL/L (ref 136–145)
SP GR UR STRIP.AUTO: 1.02 (ref 1–1.03)
SP GR UR STRIP.AUTO: 1.02 (ref 1–1.03)
T WAVE AXIS: 61 DEGREES
TROPONIN I SERPL-MCNC: <0.02 NG/ML
UROBILINOGEN UR QL STRIP.AUTO: 0.2 E.U./DL
UROBILINOGEN UR QL STRIP.AUTO: 0.2 E.U./DL
VENTRICULAR RATE: 76 BPM
WBC # BLD AUTO: 8.43 THOUSAND/UL (ref 4.31–10.16)
WBC #/AREA URNS AUTO: ABNORMAL /HPF

## 2018-07-19 PROCEDURE — 80053 COMPREHEN METABOLIC PANEL: CPT | Performed by: EMERGENCY MEDICINE

## 2018-07-19 PROCEDURE — 71046 X-RAY EXAM CHEST 2 VIEWS: CPT

## 2018-07-19 PROCEDURE — 87147 CULTURE TYPE IMMUNOLOGIC: CPT | Performed by: EMERGENCY MEDICINE

## 2018-07-19 PROCEDURE — 96375 TX/PRO/DX INJ NEW DRUG ADDON: CPT

## 2018-07-19 PROCEDURE — 36415 COLL VENOUS BLD VENIPUNCTURE: CPT | Performed by: EMERGENCY MEDICINE

## 2018-07-19 PROCEDURE — 87086 URINE CULTURE/COLONY COUNT: CPT | Performed by: EMERGENCY MEDICINE

## 2018-07-19 PROCEDURE — 85025 COMPLETE CBC W/AUTO DIFF WBC: CPT | Performed by: EMERGENCY MEDICINE

## 2018-07-19 PROCEDURE — 82550 ASSAY OF CK (CPK): CPT | Performed by: EMERGENCY MEDICINE

## 2018-07-19 PROCEDURE — 93005 ELECTROCARDIOGRAM TRACING: CPT

## 2018-07-19 PROCEDURE — 70450 CT HEAD/BRAIN W/O DYE: CPT

## 2018-07-19 PROCEDURE — 87186 SC STD MICRODIL/AGAR DIL: CPT | Performed by: EMERGENCY MEDICINE

## 2018-07-19 PROCEDURE — 81001 URINALYSIS AUTO W/SCOPE: CPT | Performed by: EMERGENCY MEDICINE

## 2018-07-19 PROCEDURE — 84484 ASSAY OF TROPONIN QUANT: CPT | Performed by: EMERGENCY MEDICINE

## 2018-07-19 PROCEDURE — 96374 THER/PROPH/DIAG INJ IV PUSH: CPT

## 2018-07-19 PROCEDURE — 99284 EMERGENCY DEPT VISIT MOD MDM: CPT

## 2018-07-19 PROCEDURE — 93010 ELECTROCARDIOGRAM REPORT: CPT | Performed by: INTERNAL MEDICINE

## 2018-07-19 RX ORDER — ACETAMINOPHEN 325 MG/1
975 TABLET ORAL ONCE
Status: COMPLETED | OUTPATIENT
Start: 2018-07-19 | End: 2018-07-19

## 2018-07-19 RX ORDER — METOCLOPRAMIDE HYDROCHLORIDE 5 MG/ML
10 INJECTION INTRAMUSCULAR; INTRAVENOUS ONCE
Status: COMPLETED | OUTPATIENT
Start: 2018-07-19 | End: 2018-07-19

## 2018-07-19 RX ORDER — NITROFURANTOIN 25; 75 MG/1; MG/1
100 CAPSULE ORAL 2 TIMES DAILY
Qty: 14 CAPSULE | Refills: 0 | Status: SHIPPED | OUTPATIENT
Start: 2018-07-19 | End: 2018-07-26

## 2018-07-19 RX ORDER — KETOROLAC TROMETHAMINE 30 MG/ML
15 INJECTION, SOLUTION INTRAMUSCULAR; INTRAVENOUS ONCE
Status: COMPLETED | OUTPATIENT
Start: 2018-07-19 | End: 2018-07-19

## 2018-07-19 RX ADMIN — METOCLOPRAMIDE 10 MG: 5 INJECTION, SOLUTION INTRAMUSCULAR; INTRAVENOUS at 11:35

## 2018-07-19 RX ADMIN — ACETAMINOPHEN 975 MG: 325 TABLET, FILM COATED ORAL at 11:35

## 2018-07-19 RX ADMIN — KETOROLAC TROMETHAMINE 15 MG: 30 INJECTION, SOLUTION INTRAMUSCULAR at 13:01

## 2018-07-19 NOTE — ED PROVIDER NOTES
History  Chief Complaint   Patient presents with    Headache     Pt states she had a HA start yesterday and now her entire body hurts  Took Naproxan at home with no relief   Generalized Body Aches     HPI  58 yof with headache that started 24 hours ago and was followed by bilateral  flank and thigh pain that started last night  HA was gradual but is described as worst ever but was gradual onset  Prior to Admission Medications   Prescriptions Last Dose Informant Patient Reported? Taking?   naproxen (NAPROSYN) 500 mg tablet 7/18/2018 at Unknown time  No Yes   Sig: Take 1 tablet (500 mg total) by mouth 2 (two) times a day with meals  Facility-Administered Medications: None       Past Medical History:   Diagnosis Date    Asthma     Back pain     Bowel incontinence     Chronic pain     History of breast implant     Psychiatric disorder     panic attacks       Past Surgical History:   Procedure Laterality Date    AUGMENTATION MAMMAPLASTY      BREAST SURGERY      lift    AL COLONOSCOPY FLX DX W/COLLJ SPEC WHEN PFRMD N/A 5/11/2017    Procedure: COLONOSCOPY;  Surgeon: Gilberto Luu MD;  Location: [facility]; Service: Gastroenterology       Family History   Problem Relation Age of Onset    Lung cancer Mother     Asthma Sister      I have reviewed and agree with the history as documented  Social History   Substance Use Topics    Smoking status: Never Smoker    Smokeless tobacco: Never Used    Alcohol use No        Review of Systems   Constitutional: Negative  Negative for fever  HENT: Negative  Respiratory: Negative  Cardiovascular: Negative  Gastrointestinal: Negative  Genitourinary: Positive for flank pain, frequency and urgency  Negative for dysuria  Musculoskeletal: Positive for myalgias  Skin: Negative  Neurological: Negative  Physical Exam  Physical Exam   Constitutional: She is oriented to person, place, and time   She appears well-developed and well-nourished  HENT:   Head: Normocephalic and atraumatic  Nose: Nose normal    Mouth/Throat: Oropharynx is clear and moist    Eyes: Conjunctivae and EOM are normal  Pupils are equal, round, and reactive to light  Neck: Normal range of motion  Cardiovascular: Normal rate and regular rhythm  Abdominal: Soft  Bowel sounds are normal  There is no tenderness  There is CVA tenderness  Musculoskeletal: Normal range of motion  Right shoulder: She exhibits no bony tenderness  Thoracic back: She exhibits tenderness  Back:    Neurological: She is alert and oriented to person, place, and time  Skin: Skin is warm and dry  Psychiatric: She has a normal mood and affect  Her behavior is normal  Judgment and thought content normal    Nursing note and vitals reviewed  Vital Signs  ED Triage Vitals   Temperature Pulse Respirations Blood Pressure SpO2   07/19/18 1032 07/19/18 1032 07/19/18 1032 07/19/18 1032 07/19/18 1032   98 3 °F (36 8 °C) 81 18 163/89 98 %      Temp src Heart Rate Source Patient Position - Orthostatic VS BP Location FiO2 (%)   -- 07/19/18 1152 07/19/18 1152 07/19/18 1152 --    Monitor Sitting Left arm       Pain Score       07/19/18 1032       Worst Possible Pain           Vitals:    07/19/18 1032 07/19/18 1152 07/19/18 1323   BP: 163/89 147/80 127/74   Pulse: 81 85 75   Patient Position - Orthostatic VS:  Sitting        Visual Acuity  Visual Acuity      Most Recent Value   L Pupil Size (mm)  3   R Pupil Size (mm)  3          ED Medications  Medications   metoclopramide (REGLAN) injection 10 mg (10 mg Intravenous Given 7/19/18 1135)   acetaminophen (TYLENOL) tablet 975 mg (975 mg Oral Given 7/19/18 1135)   ketorolac (TORADOL) injection 15 mg (15 mg Intravenous Given 7/19/18 1301)       Diagnostic Studies  Results Reviewed     Procedure Component Value Units Date/Time    Urine culture [[id number]] Collected:  07/19/18 1236    Lab Status:   In process Specimen:  Urine from Urine, Clean Catch Updated:  07/19/18 1343    Urine Microscopic [[id number]]  (Abnormal) Collected:  07/19/18 1236    Lab Status:  Final result Specimen:  Urine from Urine, Clean Catch Updated:  07/19/18 1256     RBC, UA None Seen /hpf      WBC, UA 4-10 (A) /hpf      Epithelial Cells Occasional /hpf      Bacteria, UA Occasional /hpf      Hyaline Casts, UA None Seen /lpf     UA w Reflex to Microscopic w Reflex to Culture [[id number]]  (Abnormal) Collected:  07/19/18 1236    Lab Status:  Final result Specimen:  Urine from Urine, Clean Catch Updated:  07/19/18 1254     Color, UA Yellow     Clarity, UA Clear     Specific Gravity, UA 1 017     pH, UA 7 0     Leukocytes, UA Moderate (A)     Nitrite, UA Negative     Protein, UA Negative mg/dl      Glucose, UA Negative mg/dl      Ketones, UA Negative mg/dl      Urobilinogen, UA 0 2 E U /dl      Bilirubin, UA Negative     Blood, UA Negative    ED Urine Macroscopic [[id number]]  (Abnormal) Collected:  07/19/18 1245    Lab Status:  Final result Specimen:  Urine Updated:  07/19/18 1235     Color, UA Yellow     Clarity, UA Clear     pH, UA 7 0     Leukocytes, UA Moderate (A)     Nitrite, UA Negative     Protein, UA Negative mg/dl      Glucose, UA Negative mg/dl      Ketones, UA Negative mg/dl      Urobilinogen, UA 0 2 E U /dl      Bilirubin, UA Negative     Blood, UA Negative     Specific Gravity, UA 1 020    Narrative:       CLINITEK RESULT    Troponin I [[id number]]  (Normal) Collected:  07/19/18 1140    Lab Status:  Final result Specimen:  Blood from Arm, Right Updated:  07/19/18 1212     Troponin I <0 02 ng/mL     CK (with reflex to MB) [[id number]]  (Normal) Collected:  07/19/18 1140    Lab Status:  Final result Specimen:  Blood from Arm, Right Updated:  07/19/18 1209     Total CK 93 U/L     Comprehensive metabolic panel [[id number]] Collected:  07/19/18 1140    Lab Status:  Final result Specimen:  Blood from Arm, Right Updated:  07/19/18 1209     Sodium 138 mmol/L      Potassium 4 3 mmol/L      Chloride 107 mmol/L      CO2 27 mmol/L      Anion Gap 4 mmol/L      BUN 16 mg/dL      Creatinine 0 82 mg/dL      Glucose 113 mg/dL      Calcium 8 8 mg/dL      AST 17 U/L      ALT 24 U/L      Alkaline Phosphatase 101 U/L      Total Protein 7 8 g/dL      Albumin 3 7 g/dL      Total Bilirubin 0 29 mg/dL      eGFR 77 ml/min/1 73sq m     Narrative:         National Kidney Disease Education Program recommendations are as follows:  GFR calculation is accurate only with a steady state creatinine  Chronic Kidney disease less than 60 ml/min/1 73 sq  meters  Kidney failure less than 15 ml/min/1 73 sq  meters  CBC and differential [[id number]]  (Abnormal) Collected:  07/19/18 1140    Lab Status:  Final result Specimen:  Blood from Arm, Right Updated:  07/19/18 1152     WBC 8 43 Thousand/uL      RBC 5 19 (H) Million/uL      Hemoglobin 14 6 g/dL      Hematocrit 46 6 (H) %      MCV 90 fL      MCH 28 1 pg      MCHC 31 3 (L) g/dL      RDW 13 0 %      MPV 9 8 fL      Platelets 422 Thousands/uL      nRBC 0 /100 WBCs      Neutrophils Relative 77 (H) %      Immat GRANS % 0 %      Lymphocytes Relative 16 %      Monocytes Relative 5 %      Eosinophils Relative 2 %      Basophils Relative 0 %      Neutrophils Absolute 6 50 Thousands/µL      Immature Grans Absolute 0 01 Thousand/uL      Lymphocytes Absolute 1 31 Thousands/µL      Monocytes Absolute 0 45 Thousand/µL      Eosinophils Absolute 0 13 Thousand/µL      Basophils Absolute 0 03 Thousands/µL                  CT head without contrast   Final Result by Lynsey Bucio DO (07/19 1136)      No acute intracranial abnormality  Workstation performed: [id number]         XR chest 2 views    (Results Pending)              Procedures  Procedures       Phone Contacts  ED Phone Contact    ED Course  ED Course as of Jul 19 1402   Thu Jul 19, 2018   1112 Metoclopramide and acetaminophen ordered   Lab eval underway      1251 Patient's HA is 5/10 after reglan and was 10/10 upon arrival  I recommended lumbar puncture to rule out SAH  Patient refuses lumbar puncture  I explained that CT did not fully rule out ICH but she stated she assumes the responsibility for the uncertainty of the diagnosis and again refuses lumbar puncture  She was happy with care  1359 Patient resting comfortably  Pain improved  She still wishes to go home  Discussed abnormal UA and will treat for UTI given the increased frequency  She ambulated well  MDM    CritCare Time    Disposition  Final diagnoses:   Nonintractable headache, unspecified chronicity pattern, unspecified headache type   Acute thoracic back pain, unspecified back pain laterality   UTI (urinary tract infection)     Time reflects when diagnosis was documented in both MDM as applicable and the Disposition within this note     Time User Action Codes Description Comment    7/19/2018  1:40 PM Nappe, Marie Spine Add [R51] Nonintractable headache, unspecified chronicity pattern, unspecified headache type     7/19/2018  1:40 PM Nappe, Marie Spine Add [M54 6] Acute thoracic back pain, unspecified back pain laterality     7/19/2018  1:40 PM Nappe, Warren Spine Add [N39 0] UTI (urinary tract infection)       ED Disposition     ED Disposition Condition Comment    Discharge  Yamileth Delcid discharge to home/self care      Condition at discharge: Good        Follow-up Information     Follow up With Specialties Details Why Contact Info Additional [address] [place]  In 1 day  Rafael [phone]     [address] [place] [address] Emergency Department Emergency Medicine  As needed, If symptoms worsen [address]  [phone]  ED, [address] [place] [address], [phone]          Patient's Medications   Discharge Prescriptions    NITROFURANTOIN (MACROBID) 100 MG CAPSULE    Take 1 capsule (100 mg total) by mouth 2 (two) times a day for 7 days       Start Date: 7/19/2018 End Date: 7/26/2018       Order Dose: 100 mg       Quantity: 14 capsule    Refills: 0     No discharge procedures on file      ED Provider  Electronically Signed by           Daryn Hodge DO  07/19/18 9307

## 2018-07-19 NOTE — ED PROCEDURE NOTE
PROCEDURE  ECG 12 Lead Documentation  Date/Time: 7/19/2018 11:24 AM  Performed by: Eldon Marie by: Wade Noe     Indications / Diagnosis:  Back pain  ECG reviewed by me, the ED Provider: yes    Patient location:  ED  Previous ECG:     Previous ECG:  Compared to current    Comparison ECG info:  Voltage lower today    Similarity:  Changes noted    Comparison to cardiac monitor: No    Interpretation:     Interpretation: normal    Rate:     ECG rate assessment: normal    Rhythm:     Rhythm: sinus rhythm    Ectopy:     Ectopy: none    QRS:     QRS axis:  Normal  Conduction:     Conduction: normal    ST segments:     ST segments:  Normal  T waves:     T waves: non-specific    Q waves:     Q waves:  V2  Comments:      No evidence of ischemia          Noemy Aus, DO  07/19/18 1126

## 2018-07-19 NOTE — DISCHARGE INSTRUCTIONS
Dolor de savannah severo, cuidados ambulatorios   INFORMACIÓN GENERAL:   Un dolor de savannah severo  es un dolor o incomodidad que empieza de repente y empeora rápidamente  Se desconoce la causa de un dolor de savannah severo o peg  Es posible que sea provocado por estrés, fatiga, hormonas, alimentos o traumas  Los siguientes son los síntomas más comunes de un peg dolor de savannah:   · Fiebre    · Presión en los senos paranasales (sinusitis)    · Pérdida de la memoria    · Náuseas o vómito    · Problemas con la visión, butch ojos rojos, lagrimeo, pérdida de la visión o dolor con la eduard brillante    · Rigidez del jaydon    · Sensibilidad del área de la savannah y el jaydon    · Mayor dificultad de la acostumbrada para permanecer despierto o para estar alerta que     · Debilidad o falta de energía  Busque atención inmediata al presentar los siguientes síntomas:   · Dolor peg    · Un dolor de savannah que ocurre después de un golpe a la savannah, tess caída o un trauma     · Confusión o estar olvidadizo    · Entumecimiento a un costado de la lópez o del cuerpo  El tratamiento para un dolor de savannah peg  puede incluir medicamento para tratar el dolor  También puede necesitar terapia de retro-alimentiación (biofeedback) o terapia de comportamiento cognitivo  Consulte con gonzales proveedor de [employer] y otros tratamientos para un dolor de savannah peg  La forma de lidiar con los síntomas:   · Aplique calor  en gonzales savannah carol ann 20 a 30 minutos cada 2 horas por los [employer] indicaron  El calor ayuda a disminuir el dolor y los espasmos musculares  Se puede alternar entre calor y frío  · Aplique hielo  en gonzales savannah por 15 a 20 minutos cada hora según las indicaciones  Use tess compresa de hielo o coloque hielo triturado en tess bolsa plástica y cúbrala con tess toalla  El hielo disminuye el dolor  · Relaje ale músculos  Acuéstese en tess posición cómoda y cierre los ojos  Relaje ale músculos despacio   Empiece con los dedos de ale pies y Dann Cullens subiendo por ugalde cuerpo  · Mantenga un registro de los li de [place]  [employer] y terminó ugalde dolor de [place]  Incluya los síntomas y lo que estaba haciendo cuando el dolor de [place]  Escriba en el registro lo que usted comió o bebió carol ann las 24 horas antes de que empezara ugalde dolor de [place]  Griselda Servant dolor y [employer]  Registre lo que hizo para tratar ugalde dolor de savannah y si funciono o no  Acuda a ugalde lamar de control con ugalde proveedor de mirza según le indicaron:  Lleve ugalde registro de los li de savannah cuando acuda a la lamar con ugalde proveedor de mirza  Escriba las preguntas que tenga para que no se le olvide hacerlas carol ann las citas médicas  ACUERDOS SOBRE UGALDE CUIDADO:   Usted tiene el derecho de participar en la planificación de ugalde cuidado  Aprenda todo lo que pueda sobre ugalde condición y butch darle tratamiento  Discuta con ale médicos ale opciones de tratamiento para juntos decidir el cuidado que usted quiere recibir  Usted siempre tiene el derecho a rechazar ugalde tratamiento  Esta información es sólo para uso en educación  Ugalde intención no es darle un consejo médico sobre enfermedades o tratamientos  Colsulte con ugalde Toma Every farmacéutico antes de seguir cualquier régimen médico para saber si es seguro y efectivo para usted  © 2014 [address] is for End User's use only and may not be sold, redistributed or otherwise used for commercial purposes  All illustrations and images included in CareNotes® are the copyrighted property of A D A M , Inc  or Addi Anderson  Acute Headache   WHAT YOU NEED TO KNOW:   An acute headache is pain or discomfort that starts suddenly and gets worse quickly  You may have an acute headache only when you feel stress or eat certain foods  Other acute headache pain can happen every day, and sometimes several times a day     DISCHARGE INSTRUCTIONS:   Return to the emergency department if:   · You have severe pain  · You have numbness or weakness on one side of your face or body  · You have a headache that occurs after a blow to the head, a fall, or other trauma  · You have a headache, are forgetful or confused, or have trouble speaking  · You have a headache, stiff neck, and a fever  Contact your healthcare provider if:   · You have a constant headache and are vomiting  · You have a headache each day that does not get better, even after treatment  · You have changes in your headaches, or new symptoms that occur when you have a headache  · You have questions or concerns about your condition or care  Medicines: You may need any of the following:  · Prescription pain medicine  may be given  The medicine your healthcare provider recommends will depend on the kind of headaches you have  You will need to take prescription headache medicines as directed to prevent a problem called rebound headache  These headaches happen with regular use of pain relievers for headache disorders  · NSAIDs , such as ibuprofen, help decrease swelling, pain, and fever  This medicine is available with or without a doctor's order  NSAIDs can cause stomach bleeding or kidney problems in certain people  If you take blood thinner medicine, always ask your healthcare provider if NSAIDs are safe for you  Always read the medicine label and follow directions  · Acetaminophen  decreases pain and fever  It is available without a doctor's order  Ask how much to take and how often to take it  Follow directions  Read the labels of all other medicines you are using to see if they also contain acetaminophen, or ask your doctor or pharmacist  Acetaminophen can cause liver damage if not taken correctly  Do not use more than 3 grams (3,000 milligrams) total of acetaminophen in one day  · Antidepressants  may be given for some kinds of headaches  · Take your medicine as directed  Contact your healthcare provider if you think your medicine is not helping or if you have side effects  Tell him or her if you are allergic to any medicine  Keep a list of the medicines, vitamins, and herbs you take  Include the amounts, and when and why you take them  Bring the list or the pill bottles to follow-up visits  Carry your medicine list with you in case of an emergency  Manage your symptoms:   · Apply heat or ice  on the headache area  Use a heat or ice pack  For an ice pack, you can also put crushed ice in a plastic bag  Cover the pack or bag with a towel before you apply it to your skin  Ice and heat both help decrease pain, and heat also helps decrease muscle spasms  Apply heat for 20 to 30 minutes every 2 hours  Apply ice for 15 to 20 minutes every hour  Apply heat or ice for as long and for as many days as directed  You may alternate heat and ice  · Relax your muscles  Lie down in a comfortable position and close your eyes  Relax your muscles slowly  Start at your toes and work your way up your body  · Keep a record of your headaches  Write down when your headaches start and stop  Include your symptoms and what you were doing when the headache began  Record what you ate or drank for 24 hours before the headache started  Describe the pain and where it hurts  Keep track of what you did to treat your headache and if it worked  Prevent an acute headache:   · Avoid anything that triggers an acute headache  Examples include exposure to chemicals, going to high altitude, or not getting enough sleep  Create a regular sleep routine  Go to sleep at the same time and wake up at the same time each day  Do not use electronic devices before bedtime  These may trigger a headache or prevent you from sleeping well  · Do not smoke  Nicotine and other chemicals in cigarettes and cigars can trigger an acute headache or make it worse   Ask your healthcare provider for information if you currently smoke and need help to quit  E-cigarettes or smokeless tobacco still contain nicotine  Talk to your healthcare provider before you use these products  · Limit alcohol as directed  Alcohol can trigger an acute headache or make it worse  If you have cluster headaches, do not drink alcohol during an episode  For other types of headaches, ask your healthcare provider if it is safe for you to drink alcohol  Ask how much is safe for you to drink, and how often  · Exercise as directed  Exercise can reduce tension and help with headache pain  Aim for 30 minutes of physical activity on most days of the week  Your healthcare provider can help you create an exercise plan  · Eat a variety of healthy foods  Healthy foods include fruits, vegetables, low-fat dairy products, lean meats, fish, whole grains, and cooked beans  Your healthcare provider or dietitian can help you create meals plans if you need to avoid foods that trigger headaches  Follow up with your healthcare provider as directed:  Bring your headache record with you when you see your healthcare provider  Write down your questions so you remember to ask them during your visits  © 2017 [address] [facility] Information is for End User's use only and may not be sold, redistributed or otherwise used for commercial purposes  All illustrations and images included in CareNotes® are the copyrighted property of A D A M , Inc  or [employer]  The above information is an  only  It is not intended as medical advice for individual conditions or treatments  Talk to your doctor, nurse or pharmacist before following any medical regimen to see if it is safe and effective for you  Disuria   LO QUE NECESITA SABER:   La disuria es la dificultad al orinar, o el dolor, ardor o molestias al [place]  La disuria por lo general es un síntoma de algún otro problema     INSTRUCCIONES SOBRE EL PRIYANK HOSPITALARIA:   Regrese a la nora de emergencias si: · Usted tiene dolor intenso en la espalda, en un costado o en el abdomen  · Usted tiene fiebre y escalofríos con temblor  · Usted vomita varias veces seguidas  Pregúntele a gonzales [place] vitaminas y minerales son adecuados para usted  · Ale síntomas no desaparecen, aún después del tratamiento  · Usted tiene preguntas o inquietudes acerca de gonzales condición o cuidado  Medicamentos:   · Medicamentos,  para ayudar a tratar tess infección bacteriana o para disminuir los espasmos musculares  · Reardan ale medicamentos butch se le haya indicado  Consulte con gonzales médico si usted deepika que gonzales medicamento no le está ayudando o si presenta efectos secundarios  Infórmele si es alérgico a algún medicamento  Mantenga tess lista actualizada de los [place], las vitaminas y los productos herbales que fredy  Incluya los siguientes datos de los medicamentos: cantidad, frecuencia y motivo de administración  Traiga con usted la lista o los envases de la píldoras a ale citas de seguimiento  Lleve la lista de los medicamentos con usted en nilsa de tess emergencia  Acuda a ale consultas de control con gonzales médico según le indicaron  Gonzales médico podría referirlo a un urólogo o nefrólogo para que le realicen exámenes adicionales  Anote ale preguntas para que se acuerde de hacerlas carol ann ale visitas  Controle gonzales disuria:   · Ingiera más líquidos  Los líquidos ayudan a desechar las bacterias que estén provocando tess infección  Pregunte a gonzales médico sobre la cantidad de líquido que necesita ely todos los días y cuáles le recomienda  · Reardan pranav de asiento butch se le indique  Llene tess chelsy de baño con 4 a 6 pulgadas de [place]  [address] usar un recipiente para baño de asiento que quepa en el inodoro  Siéntese en el baño de chelsy por 20 minutos  Johan esto 2 a 3 veces a la semana según le indicaron  El agua cálida va a ayudara reducir el dolor y la inflamación    © 2017 [address] Information is for End User's use only and may not be sold, redistributed or otherwise used for commercial purposes  All illustrations and images included in CareNotes® are the copyrighted property of A D A M , Inc  or Addi Andesron  Esta información es sólo para uso en educación  Gonzales intención no es darle un consejo médico sobre enfermedades o tratamientos  Colsulte con gonzales Dewain Racer farmacéutico antes de seguir cualquier régimen médico para saber si es seguro y efectivo para usted  Dysuria   WHAT YOU NEED TO KNOW:   Dysuria is difficulty urinating, or pain, burning, or discomfort with urination  Dysuria is usually a symptom of another problem  DISCHARGE INSTRUCTIONS:   Return to the emergency department if:   · You have severe back, side, or abdominal pain  · You have fever and shaking chills  · You vomit several times in a row  Contact your healthcare provider if:   · Your symptoms do not go away, even after treatment  · You have questions or concerns about your condition or care  Medicines:   · Medicines  may be given to help treat a bacterial infection or help decrease bladder spasms  · Take your medicine as directed  Contact your healthcare provider if you think your medicine is not helping or if you have side effects  Tell him of her if you are allergic to any medicine  Keep a list of the medicines, vitamins, and herbs you take  Include the amounts, and when and why you take them  Bring the list or the pill bottles to follow-up visits  Carry your medicine list with you in case of an emergency  Follow up with your healthcare provider as directed: Your healthcare provider may also refer you to a urologist or nephrologist to have additional testing  Write down your questions so you remember to ask them during your visits  Manage your dysuria:   · Drink more liquids  Liquids help flush out bacteria that may be causing an infection   Ask your healthcare provider how much liquid to drink each day and which liquids are best for you  · Take sitz baths as directed  Fill a bathtub with 4 to 6 inches of warm water  You may also use a sitz bath pan that fits over a toilet  Sit in the sitz bath for 20 minutes  Do this 2 to 3 times a day, or as directed  The warm water can help decrease pain and swelling  © 2017 [address] Information is for End User's use only and may not be sold, redistributed or otherwise used for commercial purposes  All illustrations and images included in CareNotes® are the copyrighted property of A D A M , Inc  or Addi Anderson  The above information is an  only  It is not intended as medical advice for individual conditions or treatments  Talk to your doctor, nurse or pharmacist before following any medical regimen to see if it is safe and effective for you

## 2018-07-20 ENCOUNTER — HOSPITAL ENCOUNTER (EMERGENCY)
Facility: HOSPITAL | Age: 63
Discharge: HOME | End: 2018-07-20
Attending: EMERGENCY MEDICINE | Admitting: EMERGENCY MEDICINE

## 2018-07-20 VITALS
HEART RATE: 66 BPM | DIASTOLIC BLOOD PRESSURE: 70 MMHG | SYSTOLIC BLOOD PRESSURE: 118 MMHG | TEMPERATURE: 98.3 F | WEIGHT: 139.99 LBS | RESPIRATION RATE: 16 BRPM | BODY MASS INDEX: 23.3 KG/M2 | OXYGEN SATURATION: 98 %

## 2018-07-20 DIAGNOSIS — G43.909 MIGRAINE: Primary | ICD-10-CM

## 2018-07-20 PROCEDURE — 96375 TX/PRO/DX INJ NEW DRUG ADDON: CPT

## 2018-07-20 PROCEDURE — 96374 THER/PROPH/DIAG INJ IV PUSH: CPT

## 2018-07-20 PROCEDURE — 96361 HYDRATE IV INFUSION ADD-ON: CPT

## 2018-07-20 PROCEDURE — 99283 EMERGENCY DEPT VISIT LOW MDM: CPT

## 2018-07-20 RX ORDER — NAPROXEN 375 MG/1
375 TABLET ORAL 2 TIMES DAILY WITH MEALS
Qty: 20 TABLET | Refills: 0 | Status: SHIPPED | OUTPATIENT
Start: 2018-07-20 | End: 2019-02-07

## 2018-07-20 RX ORDER — KETOROLAC TROMETHAMINE 30 MG/ML
30 INJECTION, SOLUTION INTRAMUSCULAR; INTRAVENOUS ONCE
Status: COMPLETED | OUTPATIENT
Start: 2018-07-20 | End: 2018-07-20

## 2018-07-20 RX ORDER — SODIUM CHLORIDE 9 MG/ML
1000 INJECTION, SOLUTION INTRAVENOUS CONTINUOUS
Status: DISCONTINUED | OUTPATIENT
Start: 2018-07-20 | End: 2018-07-20 | Stop reason: HOSPADM

## 2018-07-20 RX ORDER — DIPHENHYDRAMINE HYDROCHLORIDE 50 MG/ML
25 INJECTION INTRAMUSCULAR; INTRAVENOUS ONCE
Status: COMPLETED | OUTPATIENT
Start: 2018-07-20 | End: 2018-07-20

## 2018-07-20 RX ORDER — METOCLOPRAMIDE 10 MG/1
10 TABLET ORAL EVERY 6 HOURS
Qty: 20 TABLET | Refills: 0 | Status: SHIPPED | OUTPATIENT
Start: 2018-07-20 | End: 2019-02-07

## 2018-07-20 RX ORDER — METOCLOPRAMIDE HYDROCHLORIDE 5 MG/ML
10 INJECTION INTRAMUSCULAR; INTRAVENOUS ONCE
Status: COMPLETED | OUTPATIENT
Start: 2018-07-20 | End: 2018-07-20

## 2018-07-20 RX ADMIN — DIPHENHYDRAMINE HYDROCHLORIDE 25 MG: 50 INJECTION, SOLUTION INTRAMUSCULAR; INTRAVENOUS at 06:30

## 2018-07-20 RX ADMIN — METOCLOPRAMIDE 10 MG: 5 INJECTION, SOLUTION INTRAMUSCULAR; INTRAVENOUS at 06:30

## 2018-07-20 RX ADMIN — SODIUM CHLORIDE 1000 ML/HR: 0.9 INJECTION, SOLUTION INTRAVENOUS at 06:30

## 2018-07-20 RX ADMIN — KETOROLAC TROMETHAMINE 30 MG: 30 INJECTION, SOLUTION INTRAMUSCULAR at 06:29

## 2018-07-20 NOTE — DISCHARGE INSTRUCTIONS
Migraña   LO QUE NECESITA SABER:   Lysbeth Manners es un dolor de savannah intenso  El dolor puede ser tan severo que interfiere con mary actividades cotidianas  Lysbeth Manners puede durar desde pocas horas hasta varios días  La causa exacta de la migraña no es conocida  INSTRUCCIONES SOBRE EL PRIYANK HOSPITALARIA:   Busque atención médica de inmediato si:   · Usted tiene dolor de savannah que parece ser diferente o mucho peor que gonzales migraña habitual     · Usted tiene un dolor de savannah severo con fiebre o rigidez en el jaydon  · Usted tiene nuevos problemas con el habla, la visión, el equilibrio o el [address]  · Usted siente que se va a desmayar, se siente confundido o sufre tess convulsión  Comuníquese con gonzales médico o neurólogo si:   · Gonzales migraña interfiere con mary actividades cotidianas  · Mary medicamentos o tratamientos joey de funcionar  · Usted tiene preguntas o inquietudes acerca de gonzales condición o cuidado  Medicamentos:  [place] medicamento tan pronto butch sienta que le comienza Lysbeth Manners  · Un medicamento con receta para el dolor  podrían ser Kade Outlaw  No espere a que el dolor sea muy intenso para ely el medicamento  · Medicamentos para la migraña  se [place] para evitar tess migraña o detenerla tess vez que comience  · Los medicamentos contra las náuseas  pueden darse para calmar gonzales estómago y ayudarle a prevenir los vómitos  Audrey medicamento también puede aliviar el dolor  · [place] mary medicamentos butch se le haya indicado  Llame a gonzales médico si usted piensa que el medicamento no está ayudando o si tiene efectos secundarios  Infórmele si es alérgico a cualquier medicamento  Mantenga tess lista actualizada de los [place], las vitaminas y los productos herbales que fredy  Incluya los siguientes datos de los medicamentos: cantidad, frecuencia y motivo de administración  Traiga con usted la lista o los envases de la píldoras a mary citas de seguimiento   Lleve la lista de los medicamentos con usted en nilsa de tess emergencia  [place] de gonzales síntomas:   · Repose en tess habitación oscura y [place] ayudará a disminuir el dolor  El dormir también podría ayudarlo a aliviar gonzales dolor  · Aplique hielo para reducir el dolor  Use un paquete de hielo o ponga hielo molido dentro de [employer]  Cubra el paquete con hielo con tess toalla y colóquela en gonzales savannah donde siente el dolor por 15 a 20 minutos cada hora  · Aplique calor para disminuir el dolor y los espasmos musculares  Utilice tess toalla pequeña empapada con [place], tess almohada térmica o tome un baño de chelsy con agua tibia  Aplique la compresa caliente sobre el área por 20 a 30 minutos cada 2 horas  Usted puede alternar el calor y el hielo  · Mantenga un registro de las migrañas  Escriba cuándo comienzan y terminan ale migrañas  Randye Rinks y [place] haciendo cuando comenzó [place]  Registre lo que comió y lo que tomó las 24 horas antes de que comenzó gonzales migraña  Mantenga un registro de lo que hizo para tratar gonzales migraña y si funcionó  Programe tess lamar con gonzales médico o gonzales neurólogo butch se le indique:  Lleve ale registros de migrañas con usted cada vez que visite a gonzales médico  Anote ale preguntas para que se acuerde de hacerlas carol ann ale visitas  Evite otra migraña:   · No fume  La nicotina y otros químicos en los cigarrillos y cigarros pueden desencadenar tess Verlinda Splinter y [place] provocar daño al pulmón  Pida información a gonzales médico si usted actualmente fuma y necesita ayuda para dejar de fumar  Los cigarrillos electrónicos o tabaco sin humo todavía contienen nicotina  Consulte con gonzales médico antes de [employer]  · No tome alcohol  El alcohol puede provocar migraña  También es posible que interfiera con los medicamentos utilizados para tratar gonzales migraña  · Ejercítese regularmente  El ejercicio puede ayudar a evitar migrañas   Consulte con gonzales médico acerca de cuál es el mejor Taco Salcido de ejercicio para usted  · Controle el estrés  El estrés podría provocar migraña  Aprenda nuevas maneras de relajarse butch la respiración profunda  · Establezca un horario para dormir  Acuéstese y levántese a la misma hora cada día  · Coma ale comidas regularmente  Incluya alimentos [employer] fruta, verduras, panes de grano entero, productos lácteos bajos en grasa, frijoles, carne magra y pescado  No consuma alimentos o bebidas que puedan desencadenar ale migrañas  © 2016 [address] is for End User's use only and may not be sold, redistributed or otherwise used for commercial purposes  All illustrations and images included in CareNotes® are the copyrighted property of A D A M , Inc  or Addi Anderson  Esta información es sólo para uso en educación  Gonzales intención no es darle un consejo médico sobre enfermedades o tratamientos  Colsulte con gonzales Denise Ty farmacéutico antes de seguir cualquier régimen médico para saber si es seguro y efectivo para usted

## 2018-07-20 NOTE — ED ATTENDING ATTESTATION
Yury Rivas MD, saw and evaluated the patient  I have discussed the patient with the resident/non-physician practitioner and agree with the resident's/non-physician practitioner's findings, Plan of Care, and MDM as documented in the resident's/non-physician practitioner's note, except where noted  All available labs and Radiology studies were reviewed  At this point I agree with the current assessment done in the Emergency Department    I have conducted an independent evaluation of this patient a history and physical is as follows:  Gradual onset of HA   Seen yesterday  Pressure like    Had workup in hospital yesterday  Had normal head CT   Labs unremarkable   Had a migraine cocktail and got better  PRO returned  Hours later   No visual changes no speech problem  No trauma    No focal weakness   Exam the patient is in no acute distress vital signs stable she is afebrile HEENT normocephalic atraumatic pupils equal round reactive to light extraocular muscles are intact corneas are clear no temporal artery tenderness neck supple lungs clear heart regular abdomen soft nontender neurologic exam cranial nerves 2-12 intact motor 5/5 sensory grossly intact cerebellar testing normal  Impression headache  Will give Reglan Benadryl Toradol IV fluids and re-evaluate    Critical Care Time  CritCare Time    Procedures

## 2018-07-20 NOTE — ED PROVIDER NOTES
History  Chief Complaint   Patient presents with    Headache Re-Evaluation     pt was seen yesterday for migraine and discharged  states nothing has changed, still has headache now with nausea     Patient is a 40-year-old female who presents with headache  Patient was seen here yesterday for the same complaint  She describes the headache as a gradual onset pressure-like headache throughout her head that has been getting progressively worse  Workup yesterday was largely negative including CBC, CMP, troponin, EKG, CT head  UA revealed a UTI for which she was discharged on Keflex  Was given Reglan, Tylenol and, Toradol while in the ED yesterday  She had significant relief with this intervention  She was discharged home with no headache medications  She tells me that several hours after leaving the ER the headache returned  It is very severe right now  Patient also endorses nausea and 3 episodes of emesis  Emesis nonbloody nonbilious  She denies chest pain, dyspnea, syncope, focal neurological deficits, trauma  Headache was not maximal on onset  She did denies neck pain or neck stiffness  She has some cramping pain in her legs bilaterally  I have personally rechecked the patient's temperature orally and she is afebrile  Prior to Admission Medications   Prescriptions Last Dose Informant Patient Reported? Taking?   naproxen (NAPROSYN) 500 mg tablet   No No   Sig: Take 1 tablet (500 mg total) by mouth 2 (two) times a day with meals     nitrofurantoin (MACROBID) 100 mg capsule   No No   Sig: Take 1 capsule (100 mg total) by mouth 2 (two) times a day for 7 days      Facility-Administered Medications: None       Past Medical History:   Diagnosis Date    Asthma     Back pain     Bowel incontinence     Chronic pain     History of breast implant     Psychiatric disorder     panic attacks       Past Surgical History:   Procedure Laterality Date    AUGMENTATION MAMMAPLASTY      BREAST SURGERY lift    SC COLONOSCOPY FLX DX W/COLLJ SPEC WHEN PFRMD N/A 5/11/2017    Procedure: COLONOSCOPY;  Surgeon: Evita Del Rio MD;  Location: [facility]; Service: Gastroenterology       Family History   Problem Relation Age of Onset    Lung cancer Mother     Asthma Sister      I have reviewed and agree with the history as documented  Social History   Substance Use Topics    Smoking status: Never Smoker    Smokeless tobacco: Never Used    Alcohol use No        Review of Systems   Constitutional: Negative for chills, diaphoresis, fatigue and fever  HENT: Negative for facial swelling, sore throat and trouble swallowing  Respiratory: Negative for cough, chest tightness, shortness of breath and wheezing  Cardiovascular: Negative for chest pain  Gastrointestinal: Negative for abdominal distention, abdominal pain, diarrhea, nausea and vomiting  Genitourinary: Negative for dysuria  Musculoskeletal: Negative for back pain, neck pain and neck stiffness  Skin: Negative for color change, pallor, rash and wound  Neurological: Positive for headaches  Negative for dizziness, tremors, seizures, syncope, facial asymmetry, speech difficulty, weakness, light-headedness and numbness  Psychiatric/Behavioral: Positive for sleep disturbance  All other systems reviewed and are negative        Physical Exam  ED Triage Vitals   Temperature Pulse Respirations Blood Pressure SpO2   07/20/18 0555 07/20/18 0552 07/20/18 0552 07/20/18 0552 07/20/18 0552   98 3 °F (36 8 °C) 91 20 (!) 173/91 97 %      Temp Source Heart Rate Source Patient Position - Orthostatic VS BP Location FiO2 (%)   07/20/18 0555 07/20/18 0552 07/20/18 0552 07/20/18 0552 --   Oral Monitor Lying Right arm       Pain Score       07/20/18 0552       Worst Possible Pain           Orthostatic Vital Signs  Vitals:    07/20/18 0552 07/20/18 0653 07/20/18 0755 07/20/18 0845   BP: (!) 173/91 144/87 138/76 118/70   Pulse: 91 84 70 66   Patient Position - Orthostatic VS: Lying Lying Lying Lying       Physical Exam   Constitutional: She is oriented to person, place, and time  She appears well-developed and well-nourished  No distress  HENT:   Head: Normocephalic  No rhinorrhea   Eyes: Pupils are equal, round, and reactive to light  Tearing present bilaterally  Neck: Normal range of motion  Neck supple  Cardiovascular: Normal rate, regular rhythm, normal heart sounds and intact distal pulses  Pulmonary/Chest: Effort normal and breath sounds normal    Abdominal: Soft  Bowel sounds are normal  She exhibits no distension  There is no tenderness  There is no guarding  Musculoskeletal: Normal range of motion  She exhibits no edema, tenderness or deformity  No spinal point tenderness  No CVA tenderness   Neurological: She is alert and oriented to person, place, and time  No cranial nerve deficit or sensory deficit  Neurologically intact  Cranial nerves 2-12 intact and strength 5/5 in all extremities  Sensation intact throughout  Brudzinski and Kernig negative  No nuchal rigidity  Skin: Skin is warm and dry  Capillary refill takes less than 2 seconds  Psychiatric: She has a normal mood and affect  Her behavior is normal  Judgment and thought content normal        ED Medications  Medications   ketorolac (TORADOL) injection 30 mg (30 mg Intravenous Given 7/20/18 0629)   metoclopramide (REGLAN) injection 10 mg (10 mg Intravenous Given 7/20/18 0630)   diphenhydrAMINE (BENADRYL) injection 25 mg (25 mg Intravenous Given 7/20/18 0630)       Diagnostic Studies  Results Reviewed     None                 No orders to display         Procedures  Procedures      Phone Consults  ED Phone Contact    ED Course  ED Course as of Jul 22 1857 Fri Jul 20, 2018   0720 Patient given prescription for Reglan and naproxen  Headache currently a 5/10  Patient has not slept tonight and is sleeping currently  No suspicion at this time for meningitis/encephalitis  MDM  Number of Diagnoses or Management Options  Migraine: new and does not require workup  Diagnosis management comments: Patient improved with migraine cocktail  Patient diagnosed with migraine  Previous workup including CBC, CMP, CT head were negative  She is afebrile and suspicion for meningitis/encephalitis is low at this time  She was discharged with strict return to care for precautions  Patient will be driven home  Patient will be discharged with Reglan and naproxen for further migraine relief  Amount and/or Complexity of Data Reviewed  Clinical lab tests: ordered and reviewed  Tests in the radiology section of CPT®: ordered and reviewed  Tests in the medicine section of CPT®: ordered and reviewed    Risk of Complications, Morbidity, and/or Mortality  Presenting problems: low  Diagnostic procedures: minimal  Management options: minimal    Patient Progress  Patient progress: improved    CritCare Time    Disposition  Final diagnoses:   Migraine     Time reflects when diagnosis was documented in both MDM as applicable and the Disposition within this note     Time User Action Codes Description Comment    7/20/2018  7:13 AM Lola Henderson Add [G43 909] Migraine       ED Disposition     ED Disposition Condition Comment    Discharge  Levora Sow discharge to home/self care      Condition at discharge: Good        Follow-up Information     Follow up With Specialties Details Why Contact Info Additional [address] Emergency Department Emergency Medicine  If symptoms worsen [address]  [phone]  ED, [address], [place], [address]          Discharge Medication List as of 7/20/2018  8:33 AM      START taking these medications    Details   metoclopramide (REGLAN) 10 mg tablet Take 1 tablet (10 mg total) by mouth every 6 (six) hours, Starting Fri 7/20/2018, Print      !! naproxen (NAPROSYN) 375 mg tablet Take 1 tablet (375 mg total) by mouth 2 (two) times a day with meals, Starting Fri 7/20/2018, Print       !! - Potential duplicate medications found  Please discuss with provider  CONTINUE these medications which have NOT CHANGED    Details   !! naproxen (NAPROSYN) 500 mg tablet Take 1 tablet (500 mg total) by mouth 2 (two) times a day with meals  , Starting 7/18/2016, Until Discontinued, Print      nitrofurantoin (MACROBID) 100 mg capsule Take 1 capsule (100 mg total) by mouth 2 (two) times a day for 7 days, Starting Thu 7/19/2018, Until Thu 7/26/2018, Print       !! - Potential duplicate medications found  Please discuss with provider  No discharge procedures on file  ED Provider  Attending physically available and evaluated Soto Yañezwendy STERLING managed the patient along with the ED Attending      Electronically Signed by         Anna Youssef MD  07/22/18 3206

## 2018-07-21 LAB — BACTERIA UR CULT: ABNORMAL

## 2018-12-21 ENCOUNTER — HOSPITAL ENCOUNTER (EMERGENCY)
Facility: HOSPITAL | Age: 63
Discharge: LEFT BEFORE DISCHARGE | End: 2018-12-21

## 2018-12-21 ENCOUNTER — APPOINTMENT (EMERGENCY)
Dept: RADIOLOGY | Facility: HOSPITAL | Age: 63
End: 2018-12-21

## 2018-12-21 VITALS
TEMPERATURE: 98.1 F | BODY MASS INDEX: 23.3 KG/M2 | SYSTOLIC BLOOD PRESSURE: 180 MMHG | DIASTOLIC BLOOD PRESSURE: 85 MMHG | OXYGEN SATURATION: 96 % | WEIGHT: 139.99 LBS | HEART RATE: 101 BPM | RESPIRATION RATE: 22 BRPM

## 2018-12-21 PROCEDURE — 93005 ELECTROCARDIOGRAM TRACING: CPT

## 2018-12-22 LAB
ATRIAL RATE: 100 BPM
P AXIS: 77 DEGREES
PR INTERVAL: 146 MS
QRS AXIS: 80 DEGREES
QRSD INTERVAL: 70 MS
QT INTERVAL: 346 MS
QTC INTERVAL: 446 MS
T WAVE AXIS: 78 DEGREES
VENTRICULAR RATE: 100 BPM

## 2019-02-06 PROBLEM — K57.30 DIVERTICULOSIS OF LARGE INTESTINE: Status: ACTIVE | Noted: 2019-02-06

## 2019-02-06 RX ORDER — PREDNISONE 20 MG/1
TABLET ORAL
Refills: 0 | COMMUNITY
Start: 2018-12-22 | End: 2020-01-30 | Stop reason: ALTCHOICE

## 2019-02-06 RX ORDER — PAROXETINE 10 MG/1
10 TABLET, FILM COATED ORAL
COMMUNITY
End: 2019-02-07

## 2019-02-07 ENCOUNTER — OFFICE VISIT (OUTPATIENT)
Dept: INTERNAL MEDICINE CLINIC | Facility: CLINIC | Age: 64
End: 2019-02-07

## 2019-02-07 VITALS
SYSTOLIC BLOOD PRESSURE: 108 MMHG | HEIGHT: 65 IN | TEMPERATURE: 97.6 F | DIASTOLIC BLOOD PRESSURE: 70 MMHG | BODY MASS INDEX: 25.49 KG/M2 | WEIGHT: 153 LBS | HEART RATE: 76 BPM

## 2019-02-07 DIAGNOSIS — Z13.220 LIPID SCREENING: ICD-10-CM

## 2019-02-07 DIAGNOSIS — J45.20 MILD INTERMITTENT ASTHMA WITHOUT COMPLICATION: ICD-10-CM

## 2019-02-07 DIAGNOSIS — G47.09 OTHER INSOMNIA: Primary | ICD-10-CM

## 2019-02-07 PROCEDURE — 99213 OFFICE O/P EST LOW 20 MIN: CPT | Performed by: INTERNAL MEDICINE

## 2019-02-07 RX ORDER — ALBUTEROL SULFATE 90 UG/1
1 AEROSOL, METERED RESPIRATORY (INHALATION) EVERY 6 HOURS PRN
Qty: 8.5 G | Refills: 1 | Status: SHIPPED | OUTPATIENT
Start: 2019-02-07 | End: 2020-09-29 | Stop reason: SDUPTHER

## 2019-02-07 NOTE — PROGRESS NOTES
Assessment/Plan:       Problem List Items Addressed This Visit        Respiratory    Asthma    Relevant Medications    albuterol (PROAIR HFA) 90 mcg/act inhaler       Other    Other insomnia - Primary     · I counseled patient on sleep hygiene and gave her information on insomnia  Advise limiting caffeine intake, having the room dark at night, limiting screen exposure at night  · If any further questions, can call  · Will see again in 6 months for routine follow-up           Other Visit Diagnoses     Lipid screening        Relevant Orders    Lipid Panel with Direct LDL reflex            Subjective:      Patient ID: Rach Steele is a 61 y o  female  HPI    This is a pleasant 61year old female here for a routine follow-up  She does not have sick complaints today, but did have questions about difficulty sleeping  Patient at time says she experiences insomnia  Pt says she takes "Unisom" pills to help her sleep     She reports that she usually takes this pill once or twice a week  She usually drinks coffee once in the morning  She doesn't have a TV in her room but says she does watch TV late at night because she works afternoons  Room is dark but says she has a tiny night light  Not a lot of noise  She says she usually sleeps about 8-9 hours a night  But sometimes if she has trouble sleeping, she only gets about 4 hours a night  She takes an inhaler at home  Is requesting labs to check her "liver and kidneys" to make sure her organs are working right  Has appt next week for mammogram   Already got the flu shot  She has been on paroxetine in the past for depression, but does not take this anymore  She says she is under some stress at home due to events regarding a friend, but says she is not depressed and denies suicide ideation  Says she does not normally feel anxious      The following portions of the patient's history were reviewed and updated as appropriate: allergies, current medications, past family history, past medical history, past social history, past surgical history and problem list     Review of Systems   Constitutional: Negative for chills and fever  HENT: Negative  Negative for hearing loss, sore throat and trouble swallowing  Eyes: Negative for visual disturbance  Respiratory: Negative for cough, shortness of breath and wheezing  Cardiovascular: Negative for chest pain and palpitations  Gastrointestinal: Negative for abdominal pain, diarrhea, nausea and vomiting  Endocrine: Negative  Genitourinary: Negative for dysuria, frequency and hematuria  Musculoskeletal: Negative for arthralgias, joint swelling and myalgias  Skin: Negative  Neurological: Negative for weakness  Hematological: Negative  Psychiatric/Behavioral: Positive for sleep disturbance  Negative for confusion, dysphoric mood and self-injury  Objective:      /70   Pulse 76   Temp 97 6 °F (36 4 °C)   Ht 5' 4 5" (1 638 m)   Wt 69 4 kg (153 lb)   BMI 25 86 kg/m²        Physical Exam   Constitutional: She is oriented to person, place, and time  She appears well-developed and well-nourished  No distress  HENT:   Head: Normocephalic and atraumatic  Eyes: Pupils are equal, round, and reactive to light  Conjunctivae and EOM are normal    Neck: Normal range of motion  Neck supple  No thyromegaly present  Cardiovascular: Normal rate, regular rhythm, normal heart sounds and intact distal pulses  Pulmonary/Chest: Effort normal and breath sounds normal    Abdominal: Soft  She exhibits no distension  There is no tenderness  Musculoskeletal: Normal range of motion  She exhibits no edema  Lymphadenopathy:        Head (right side): No submental and no submandibular adenopathy present  Head (left side): No submental and no submandibular adenopathy present  She has no cervical adenopathy  She has no axillary adenopathy  Right: No supraclavicular adenopathy present  Left: No supraclavicular adenopathy present  Neurological: She is alert and oriented to person, place, and time  She has normal strength  Skin: Skin is warm and dry  She is not diaphoretic  Psychiatric: She has a normal mood and affect  Vitals reviewed  PHQ-9 Depression Screening    PHQ-9:    Frequency of the following problems over the past two weeks:       Little interest or pleasure in doing things:  0 - not at all  Feeling down, depressed, or hopeless:  1 - several days  Trouble falling or staying asleep, or sleeping too much:  1 - several days  Feeling tired or having little energy:  1 - several days  Poor appetite or overeatin - not at all  Feeling bad about yourself - or that you are a failure or have let yourself or your family down:  0 - not at all  Trouble concentrating on things, such as reading the newspaper or watching television:  0 - not at all  Moving or speaking so slowly that other people could have noticed   Or the opposite - being so fidgety or restless that you have been moving around a lot more than usual:  0 - not at all  Thoughts that you would be better off dead, or of hurting yourself in some way:  0 - not at all  PHQ-2 Score:  1  PHQ-9 Score:  3

## 2019-02-07 NOTE — ASSESSMENT & PLAN NOTE
· I counseled patient on sleep hygiene and gave her information on insomnia  Advise limiting caffeine intake, having the room dark at night, limiting screen exposure at night    · If any further questions, can call  · Will see again in 6 months for routine follow-up

## 2019-02-07 NOTE — PATIENT INSTRUCTIONS
· Get blood work to check your cholesterol  · We have sent a script for albuterol inhaler for your asthma  If this is the wrong medication, please call and let us know  Insomnio   LO QUE NECESITA SABER:   El insomnio es tess afección que hace difícil conciliar el sueño o mantenerse dormido  La falta de sueño puede conllevar a problemas de atención o de la memoria carol ann el día  Usted también podría estar de malhumor, deprimido, torpe o tener li de [place]  INSTRUCCIONES SOBRE EL PRIYANK HOSPITALARIA:   Pregúntele a gonzales Leamon Handsome vitaminas y minerales son adecuados para usted  · Mary síntomas no mejoran o Xochitl Net  · Jevon Lemming a usar drogas o alcohol para conciliar el sueño  · Usted tiene preguntas o inquietudes acerca de gonzales condición o cuidado  Medicamentos:   · Medicamentos,  podrían ayudarle a dormir con más regularidad o ayudarlo a sentirse menos ansioso  · [place] mary medicamentos butch se le haya indicado  Consulte con gonzales médico si usted deepika que gonzales medicamento no le está ayudando o si presenta efectos secundarios  Infórmele si es alérgico a cualquier medicamento  Mantenga tess lista actualizada de los [place], las vitaminas y los productos herbales que fredy  Incluya los siguientes datos de los medicamentos: cantidad, frecuencia y motivo de administración  Traiga con usted la lista o los envases de la píldoras a mary citas de seguimiento  Lleve la lista de los medicamentos con usted en nilsa de tess emergencia  Qué puede hacer para mejorar el sueño:   · Establezca un horario para dormir  [place] le ayudará a formar tess rutina de sueño  Mantenga un registro de los patrones de sueño y cualquier problema para dormir que tenga  Lleve el registro a las citas de seguimiento con mary médicos  · No tome siestas  Las siestas podrían dificultarle que se quede dormido a la hora de acostarse por la noche  · Mantenga gonzales habitación fresca, sin ruidos y [place]    [facility] un ruido o roseanna [employer] el del ventilador, para que lo relaje  No utilice gonzales cama para ninguna actividad que lo mantenga despierto  No jadiel ni fred ejercicio, ni coma ni karine televisión en gonzales habitación  · [place] de la cama si no se queda dormido dentro de 20 minutos  Lauree Lofts a otra habitación y fred algo que lo relaje hasta que le de sueño  · Limite el consumo de cafeína, alcohol y de alimentos muy temprano en el día  Solo tome café en la mañana  No tome alcohol dentro de las 6 horas antes de Shaniko Coho  No coma alimentos pesados [place] antes de acostarse  · Realice actividad física con regularidad  El ejercicio diario podría ayudarlo a dormir mejor  No se ejercite dentro de las 4 horas antes de Shaniko Coho  Acuda a ale consultas de control con gonzales médico según le indicaron  Gonzales médico podría referirlo a terapia cognitiva conductual  Un terapeuta de la conducta podría ayudarlo a encontrar [facility], de disminuir el estrés y de mejorar el sueño  Anote ale preguntas para que se acuerde de hacerlas carol ann ale visitas  © 2017 [address] Information is for End User's use only and may not be sold, redistributed or otherwise used for commercial purposes  All illustrations and images included in CareNotes® are the copyrighted property of A D A M , Inc  or Addi Anderson  Esta información es sólo para uso en educación  Gonzales intención no es darle un consejo médico sobre enfermedades o tratamientos  Colsulte con gonzales [place] farmacéutico antes de seguir cualquier régimen médico para saber si es seguro y efectivo para usted

## 2019-02-12 ENCOUNTER — APPOINTMENT (OUTPATIENT)
Dept: LAB | Facility: CLINIC | Age: 64
End: 2019-02-12

## 2019-02-12 DIAGNOSIS — Z13.220 LIPID SCREENING: ICD-10-CM

## 2019-02-12 LAB
CHOLEST SERPL-MCNC: 254 MG/DL (ref 50–200)
HDLC SERPL-MCNC: 52 MG/DL (ref 40–60)
LDLC SERPL CALC-MCNC: 151 MG/DL (ref 0–100)
TRIGL SERPL-MCNC: 257 MG/DL

## 2019-02-12 PROCEDURE — 80061 LIPID PANEL: CPT

## 2019-02-12 PROCEDURE — 36415 COLL VENOUS BLD VENIPUNCTURE: CPT

## 2019-02-19 ENCOUNTER — TELEPHONE (OUTPATIENT)
Dept: OTHER | Facility: HOSPITAL | Age: 64
End: 2019-02-19

## 2019-02-19 PROBLEM — E78.2 MIXED HYPERLIPIDEMIA: Chronic | Status: ACTIVE | Noted: 2019-02-19

## 2019-02-19 NOTE — TELEPHONE ENCOUNTER
Please call patient to inform her that her lipid panel showed that she has high cholesterol  However, we will hold off on starting cholesterol medications at this time, since she has no other risk factors such as smoking, hypertension, known coronary artery disease, or diabetes  (Based on this, her 10-year risk of heart disease is <7 5%)  Instead, we recommend therapeutic lifestyle changes such as eating a healthy diet and getting regular exercise  Please let us know if she has any further questions

## 2019-02-19 NOTE — TELEPHONE ENCOUNTER
SPOKE WITH PATIENT AND STATES SHE IS NOT SURE WHY HER CHOLESTEROL IS HIGH , STATES THAT SHE DOES NOT EAT FAST FOOD NOR FRIED FOODS SHE EATS VERY HEALTHY   ALSO WORKS OUT 3 TIMES A WEEK AT THE [facility]   STATES THAT SHE HAS ALWAYS CAME OUT WITH ELEVATED CHOLESTEROL AND SHE IS NOT SURE WHY , BUT SHE IS NOT CONCERNED ABOUT IT    JUST AN FYI

## 2019-03-19 ENCOUNTER — TELEPHONE (OUTPATIENT)
Dept: INTERNAL MEDICINE CLINIC | Facility: CLINIC | Age: 64
End: 2019-03-19

## 2019-03-19 NOTE — TELEPHONE ENCOUNTER
Patient called complaining of body pain  Especially the past 3 days she had it in her legs and arms  She did say she had chest pain about 3 weeks ago  Call transferred to [facility] for triage

## 2019-03-19 NOTE — TELEPHONE ENCOUNTER
The chest pain she reported that occurred 3 weeks ago has been going on for 3 years on and off  She states that when it happen she feels SOB and needs to inhale and exhale slowly and deeply and it clears after she burps  Patient added to Dr Stephen Marinelli schedule on Thursday for evaluation

## 2019-03-20 RX ORDER — PAROXETINE 10 MG/1
10 TABLET, FILM COATED ORAL
COMMUNITY
End: 2020-01-30 | Stop reason: ALTCHOICE

## 2019-03-21 ENCOUNTER — OFFICE VISIT (OUTPATIENT)
Dept: INTERNAL MEDICINE CLINIC | Facility: CLINIC | Age: 64
End: 2019-03-21

## 2019-03-21 VITALS
SYSTOLIC BLOOD PRESSURE: 126 MMHG | BODY MASS INDEX: 25.79 KG/M2 | OXYGEN SATURATION: 95 % | WEIGHT: 154.76 LBS | HEIGHT: 65 IN | DIASTOLIC BLOOD PRESSURE: 80 MMHG | HEART RATE: 68 BPM | TEMPERATURE: 97.4 F

## 2019-03-21 DIAGNOSIS — R07.9 CHEST PAIN: ICD-10-CM

## 2019-03-21 DIAGNOSIS — L30.9 ECZEMA: ICD-10-CM

## 2019-03-21 DIAGNOSIS — M79.10 MUSCLE PAIN: Primary | ICD-10-CM

## 2019-03-21 PROCEDURE — 99213 OFFICE O/P EST LOW 20 MIN: CPT | Performed by: INTERNAL MEDICINE

## 2019-03-21 PROCEDURE — 93000 ELECTROCARDIOGRAM COMPLETE: CPT | Performed by: INTERNAL MEDICINE

## 2019-03-21 RX ORDER — DIAPER,BRIEF,INFANT-TODD,DISP
EACH MISCELLANEOUS 2 TIMES DAILY
Qty: 30 G | Refills: 0 | Status: SHIPPED | OUTPATIENT
Start: 2019-03-21 | End: 2020-01-30 | Stop reason: ALTCHOICE

## 2019-03-21 RX ORDER — METHOCARBAMOL 500 MG/1
500 TABLET, FILM COATED ORAL 4 TIMES DAILY
Qty: 90 TABLET | Refills: 0 | Status: SHIPPED | OUTPATIENT
Start: 2019-03-21 | End: 2020-01-30 | Stop reason: ALTCHOICE

## 2019-03-21 RX ORDER — SIMETHICONE 180 MG
180 CAPSULE ORAL 4 TIMES DAILY PRN
Qty: 120 CAPSULE | Refills: 0 | Status: SHIPPED | OUTPATIENT
Start: 2019-03-21 | End: 2020-01-30 | Stop reason: ALTCHOICE

## 2019-03-21 RX ORDER — CAPSAICIN 0.07 G/100G
CREAM TOPICAL 3 TIMES DAILY
Qty: 28.3 G | Refills: 0 | Status: SHIPPED | OUTPATIENT
Start: 2019-03-21 | End: 2019-09-11

## 2019-03-21 NOTE — PROGRESS NOTES
INTERNAL MEDICINE FOLLOW-UP OFFICE VISIT  [facility] [place]  [address] [facility] [place] [address] [facility] [address]    NAME: Bruno Culp  AGE: 61 y o  SEX: female    DATE OF ENCOUNTER: 3/21/2019    Assessment and Plan   Patient is a 61-year-old female with past medical history of asthma, chronic pain, hyperlipidemia presents for multiple complaints    Left shoulder/legpain and diffuse back pain   Patient reports since Saturday she has been experiencing left lower leg pain, left thigh pain, left shoulder pain, of bilateral back pain diffuse  No trauma, she woke up with this pain  She has increased her exercise recently     Describes is throbbing pain occurring at rest worsened with exertion, no radiation  No other associated symptoms  Suspect muscle strain in setting of increasing workout  There may be a psychological component given history of depression (refusing treatment), chronic pain, insomnia  Physical exam entirely benign, no evidence of joint swelling/pain, no decrease in range of motion in any joints, no decrease in motor strength, no obvious signs of trauma  · Robaxin, Voltaren gel, capsaicin cream  · Physical Therapy  · Continue naprosyn  · If refractory can consider fibromyalgia during next visit, though appears more acute at this time, however can help treat depression, insomnia together    Rash  Possibly 2/2 eczema  Patient reports every 3-4 months getting pruritic erythematous rash for 5 years occurring in isolated areas the bilateral groin, armpits, and shoulder  No pain or tenderness  No discharge  See media for images  Does not appear zoster-like  · Hydrocortisone cream 1% prescribed  · If refractory, next visit can refer to Dermatology    Chest Pain  Patient reports 1 history of chest pain occurring every month, substernal, described as tightness, 10 at 10 intensity, no radiation,  improved with rest and deep breaths    Associated with shortness of breath and "gassiness" but not related to food/heartburn  Last 10-15 minutes  Occurs at rest, worsened with exertion and relieved with rest    · EKG in clinic shows NSR with no ischemic changes  · Stress test ordered  · Gas-X (as associated "gassiness")  · F/u in 4-8 weeks with PCP Dr Tyrone Ramirez      Diagnoses and all orders for this visit:    Muscle pain  -     diclofenac sodium (VOLTAREN) 1 %; Apply 2 g topically 4 (four) times a day  -     Ambulatory referral to Physical Therapy; Future  -     methocarbamol (ROBAXIN) 500 mg tablet; Take 1 tablet (500 mg total) by mouth 4 (four) times a day  -     capsicum (ZOSTRIX) 0 075 % topical cream; Apply topically 3 (three) times a day    Eczema  -     hydrocortisone 0 5 % cream; Apply topically 2 (two) times a day    Chest pain  -     Stress test only, exercise; Future  -     simethicone (MYLICON,GAS-X) 246 MG capsule; Take 1 capsule (180 mg total) by mouth 4 (four) times a day as needed for flatulence    Other orders  -     PARoxetine (PAXIL) 10 mg tablet; Take 10 mg by mouth        Orders Placed This Encounter   Procedures    Ambulatory referral to Physical Therapy    Stress test only, exercise       - Counseling Documentation: patient was counseled regarding: diagnostic results    Chief Complaint     Chief Complaint   Patient presents with    Pain     body pain; feels like she can't breathe right; abcess on lower back/buttock       History of Present Illness     Patient is a 51-year-old female with past medical history of asthma, chronic pain, hyperlipidemia presents for multiple complaints  See A/P for further details         The following portions of the patient's history were reviewed and updated as appropriate: allergies, current medications, past family history, past medical history, past social history, past surgical history and problem list     Review of Systems     ROS  CONSTITUTIONAL: Denies any fever, chills, rigors, and weight loss  HEENT: No earache or tinnitus   Denies hearing loss   CARDIOVASCULAR: No chest pain or palpitations  RESPIRATORY: Denies any cough, hemoptysis, shortness of breath or dyspnea on exertion  GASTROINTESTINAL: Denies abdominal pain, nausea, vomitng   NEUROLOGIC: No dizziness or vertigo, denies headaches  MUSCULOSKELETAL: Denies any muscle or joint pain  SKIN: Denies skin rashes or itching  Active Problem List     Patient Active Problem List   Diagnosis    Asthma    Depression    Lumbar radiculopathy    Panic disorder    Seasonal allergies    Diverticulosis of large intestine    Other insomnia    Mixed hyperlipidemia       Objective     /80   Pulse 68   Temp (!) 97 4 °F (36 3 °C)   Ht 5' 4 5" (1 638 m)   Wt 70 2 kg (154 lb 12 2 oz)   SpO2 95%   BMI 26 15 kg/m²     Physical Exam:   GENERAL: NAD  HEENT:  NC/AT, PERRL, EOMI, MMM, no scleral icterus  CARDIAC:  Nontender to palpation:  RRR, +S1/S2, no S3/S4 heard, no m/g/r  PULMONARY:  CTA B/L, no wheezing/rales/rhonci, non-labored breathing  ABDOMEN:  Soft, NT/ND, +BS, no rebound/guarding/rigidity  Extremities:  2+ Pulses in DP/PT  No edema, cyanosis, or clubbing  NEUROLOGIC:  Alert/oriented x3  Back:  No obvious trauma/redness, no tenderness to palpation, negative straight leg test, motor/sensory intact  Left shoulder:  Full range of motion, no obvious trauma/redness, able to raise arm up to 130° though does have mild pain from  degrees,    Muscle strength sensation    Pertinent Laboratory/Diagnostic Studies:  CBC:   Lab Results   Component Value Date/Time    WBC 8 43 07/19/2018 11:40 AM    WBC 7 60 02/22/2014 05:01 PM    RBC 5 19 (H) 07/19/2018 11:40 AM    RBC 4 79 02/22/2014 05:01 PM    HGB 14 6 07/19/2018 11:40 AM    HGB 14 2 02/22/2014 05:01 PM    HCT 46 6 (H) 07/19/2018 11:40 AM    HCT 41 9 02/22/2014 05:01 PM    MCV 90 07/19/2018 11:40 AM    MCV 88 02/22/2014 05:01 PM    MCH 28 1 07/19/2018 11:40 AM    MCH 29 6 02/22/2014 05:01 PM    MCHC 31 3 (L) 07/19/2018 11:40 AM MCHC 33 9 02/22/2014 05:01 PM    RDW 13 0 07/19/2018 11:40 AM    RDW 13 4 02/22/2014 05:01 PM    MPV 9 8 07/19/2018 11:40 AM    MPV 10 5 02/22/2014 05:01 PM     07/19/2018 11:40 AM     02/22/2014 05:01 PM    NRBC 0 07/19/2018 11:40 AM    NEUTOPHILPCT 77 (H) 07/19/2018 11:40 AM    NEUTOPHILPCT 70 02/22/2014 05:01 PM    LYMPHOPCT 16 07/19/2018 11:40 AM    LYMPHOPCT 24 02/22/2014 05:01 PM    MONOPCT 5 07/19/2018 11:40 AM    MONOPCT 5 02/22/2014 05:01 PM    EOSPCT 2 07/19/2018 11:40 AM    EOSPCT 1 02/22/2014 05:01 PM    BASOPCT 0 07/19/2018 11:40 AM    BASOPCT 0 02/22/2014 05:01 PM    NEUTROABS 6 50 07/19/2018 11:40 AM    NEUTROABS 5 32 02/22/2014 05:01 PM    LYMPHSABS 1 31 07/19/2018 11:40 AM    LYMPHSABS 1 82 02/22/2014 05:01 PM    MONOSABS 0 45 07/19/2018 11:40 AM    MONOSABS 0 38 02/22/2014 05:01 PM    EOSABS 0 13 07/19/2018 11:40 AM    EOSABS 0 08 02/22/2014 05:01 PM     Chemistry Profile:       Invalid input(s): EXTSODIUM, EXTPOTASSIUM, EXTCO2, EXTANIONGAP, EXTBUN, EXTCREAT, EXTGLUBLD, GLU, SLAMBGLUCOSE, EXTCALCIUM, CAADJUST, ALBUMIN, SERUMALBUMIN, EXTEGFR    Current Medications     Current Outpatient Medications:     acetaminophen-codeine (TYLENOL WITH CODEINE) 120-12 mg/5 mL suspension, Take 5 mL by mouth every 6 (six) hours as needed, Disp: , Rfl:     albuterol (PROAIR HFA) 90 mcg/act inhaler, Inhale 1 puff every 6 (six) hours as needed for wheezing, Disp: 8 5 g, Rfl: 1    naproxen (NAPROSYN) 500 mg tablet, Take 1 tablet (500 mg total) by mouth 2 (two) times a day with meals  , Disp: 30 tablet, Rfl: 0    capsicum (ZOSTRIX) 0 075 % topical cream, Apply topically 3 (three) times a day, Disp: 28 3 g, Rfl: 0    diclofenac sodium (VOLTAREN) 1 %, Apply 2 g topically 4 (four) times a day, Disp: 1 Tube, Rfl: 3    hydrocortisone 0 5 % cream, Apply topically 2 (two) times a day, Disp: 30 g, Rfl: 0    methocarbamol (ROBAXIN) 500 mg tablet, Take 1 tablet (500 mg total) by mouth 4 (four) times a day, Disp: 90 tablet, Rfl: 0    PARoxetine (PAXIL) 10 mg tablet, Take 10 mg by mouth, Disp: , Rfl:     predniSONE 20 mg tablet, TK 3 TABLETS DAILY FOR 5 DAYS, Disp: , Rfl: 0    simethicone (MYLICON,GAS-X) 163 MG capsule, Take 1 capsule (180 mg total) by mouth 4 (four) times a day as needed for flatulence, Disp: 120 capsule, Rfl: 0    Health Maintenance     Health Maintenance   Topic Date Due    Hepatitis C Screening  1955    BMI: Followup Plan  10/26/1973    BMI: Adult  02/07/2020    MAMMOGRAM  02/12/2020    DTaP,Tdap,and Td Vaccines (2 - Td) 04/01/2021    CRC Screening: Colonoscopy  05/11/2022    INFLUENZA VACCINE  Completed    HEPATITIS B VACCINES  Aged Out     Immunization History   Administered Date(s) Administered    INFLUENZA 11/23/2013, 05/13/2014, 03/04/2016, 03/16/2018, 01/04/2019    Tdap 04/01/2011       GLADIS Montiel    Internal Medicine PGY-2  3/21/2019 10:54 AM

## 2019-09-11 ENCOUNTER — OFFICE VISIT (OUTPATIENT)
Dept: INTERNAL MEDICINE CLINIC | Facility: CLINIC | Age: 64
End: 2019-09-11

## 2019-09-11 VITALS
DIASTOLIC BLOOD PRESSURE: 82 MMHG | TEMPERATURE: 97.8 F | SYSTOLIC BLOOD PRESSURE: 122 MMHG | WEIGHT: 149.47 LBS | HEART RATE: 72 BPM | HEIGHT: 65 IN | BODY MASS INDEX: 24.9 KG/M2

## 2019-09-11 DIAGNOSIS — M19.041 OSTEOARTHRITIS OF BOTH HANDS, UNSPECIFIED OSTEOARTHRITIS TYPE: Primary | ICD-10-CM

## 2019-09-11 DIAGNOSIS — M77.9 TENDINITIS: ICD-10-CM

## 2019-09-11 DIAGNOSIS — M19.042 OSTEOARTHRITIS OF BOTH HANDS, UNSPECIFIED OSTEOARTHRITIS TYPE: Primary | ICD-10-CM

## 2019-09-11 PROCEDURE — 99213 OFFICE O/P EST LOW 20 MIN: CPT | Performed by: INTERNAL MEDICINE

## 2019-09-11 RX ORDER — MELOXICAM 15 MG/1
15 TABLET ORAL DAILY
Qty: 14 TABLET | Refills: 0 | Status: SHIPPED | OUTPATIENT
Start: 2019-09-11 | End: 2020-01-30 | Stop reason: ALTCHOICE

## 2019-09-11 RX ORDER — CAPSAICIN 0.07 G/100G
CREAM TOPICAL 3 TIMES DAILY
Qty: 28.3 G | Refills: 0 | Status: SHIPPED | OUTPATIENT
Start: 2019-09-11 | End: 2020-01-30 | Stop reason: ALTCHOICE

## 2019-09-11 NOTE — PROGRESS NOTES
INTERNAL MEDICINE FOLLOW-UP OFFICE VISIT  [facility]  [address] [place] [address]    NAME: Ольга Thorne  AGE: 61 y o  SEX: female    DATE OF ENCOUNTER: 9/11/2019    Assessment and Plan   Patient is a 24-year-old female with a past medical history of chronic pain, asthma, hyperlipidemia who presents with pain in her hand    Hand pain 2/2 OA and tendinitis  Classic symptoms of osteoarthritis in the bilateral hands however believe her contracted fingers are from tendinitis in her hands that improved with stretching  · Mobic x 14 days  · Capsaicin PRN  · Consider duloxetine in the future for osteoarthritis, however will opt to discontinue Paxil at that time  · Encouraged stretching  · ICE/Heat  · Defer imaging at this time  · Avoid strenuous exercise with her hands        Diagnoses and all orders for this visit:    Osteoarthritis of both hands, unspecified osteoarthritis type  -     capsicum (ZOSTRIX) 0 075 % topical cream; Apply topically 3 (three) times a day  -     meloxicam (MOBIC) 15 mg tablet; Take 1 tablet (15 mg total) by mouth daily  -     diclofenac sodium (VOLTAREN) 1 %; Apply 2 g topically 4 (four) times a day    Tendinitis  -     capsicum (ZOSTRIX) 0 075 % topical cream; Apply topically 3 (three) times a day  -     meloxicam (MOBIC) 15 mg tablet; Take 1 tablet (15 mg total) by mouth daily        No orders of the defined types were placed in this encounter       - Counseling Documentation: patient was counseled regarding: diagnostic results, instructions for management and risk factor reductions    Chief Complaint     Chief Complaint   Patient presents with    Follow-up     arthritis pain     Headache       History of Present Illness     Patient is a 24-year-old female with a past medical history of chronic pain, asthma, hyperlipidemia who presents with pain in her hand    Patient reports a 8 year history of bilateral pain in her MCP and PIP joints worse in the morning, 5 at 10 intensity, intermittent occurring 2 times per day for the past 8 years  Associated with morning stiffness lasting 15 minutes but no joint swelling or erythema  Denies any systemic symptoms  Denies any fevers chills  Patient denies chest pain, palpitations, SOB, cough, abdominal pain, nausea, vomiting, constipation, diarrhea, fevers/chills, headaches, dysuria  The following portions of the patient's history were reviewed and updated as appropriate: allergies, current medications, past family history, past medical history, past social history, past surgical history and problem list     Review of Systems     ROS  CONSTITUTIONAL: Denies any fever, chills, rigors, and weight loss  HEENT: +HA  CARDIOVASCULAR: No chest pain or palpitations  RESPIRATORY: Denies any cough, hemoptysis, shortness of breath or dyspnea on exertion  GASTROINTESTINAL: Denies abdominal pain, nausea, vomitng   NEUROLOGIC: No dizziness or vertigo, denies headaches  MUSCULOSKELETAL: +joint pain  SKIN: Denies skin rashes or itching  Active Problem List     Patient Active Problem List   Diagnosis    Asthma    Depression    Lumbar radiculopathy    Panic disorder    Seasonal allergies    Diverticulosis of large intestine    Other insomnia    Mixed hyperlipidemia       Objective     /82   Pulse 72   Temp 97 8 °F (36 6 °C)   Ht 5' 4 5" (1 638 m)   Wt 67 8 kg (149 lb 7 6 oz)   BMI 25 26 kg/m²     Physical Exam:   GENERAL: NAD  HEENT:  NC/AT, PERRL, EOMI, MMM, no scleral icterus  CARDIAC:  RRR, +S1/S2, no S3/S4 heard, no m/g/r  PULMONARY:  CTA B/L, no wheezing/rales/rhonci, non-labored breathing  ABDOMEN:  Soft, NT/ND, +BS, no rebound/guarding/rigidity  Extremities:  B/l canceled full range of motion, no swelling or tenderness to palpation there is significant contracture of the PIP in the right middle finger but no other contractures noticed  This contractures been chronic      Pertinent Laboratory/Diagnostic Studies:  CBC:   Lab Results   Component Value Date/Time    WBC 8 43 07/19/2018 11:40 AM    WBC 7 60 02/22/2014 05:01 PM    RBC 5 19 (H) 07/19/2018 11:40 AM    RBC 4 79 02/22/2014 05:01 PM    HGB 14 6 07/19/2018 11:40 AM    HGB 14 2 02/22/2014 05:01 PM    HCT 46 6 (H) 07/19/2018 11:40 AM    HCT 41 9 02/22/2014 05:01 PM    MCV 90 07/19/2018 11:40 AM    MCV 88 02/22/2014 05:01 PM    MCH 28 1 07/19/2018 11:40 AM    MCH 29 6 02/22/2014 05:01 PM    MCHC 31 3 (L) 07/19/2018 11:40 AM    MCHC 33 9 02/22/2014 05:01 PM    RDW 13 0 07/19/2018 11:40 AM    RDW 13 4 02/22/2014 05:01 PM    MPV 9 8 07/19/2018 11:40 AM    MPV 10 5 02/22/2014 05:01 PM     07/19/2018 11:40 AM     02/22/2014 05:01 PM    NRBC 0 07/19/2018 11:40 AM    NEUTOPHILPCT 77 (H) 07/19/2018 11:40 AM    NEUTOPHILPCT 70 02/22/2014 05:01 PM    LYMPHOPCT 16 07/19/2018 11:40 AM    LYMPHOPCT 24 02/22/2014 05:01 PM    MONOPCT 5 07/19/2018 11:40 AM    MONOPCT 5 02/22/2014 05:01 PM    EOSPCT 2 07/19/2018 11:40 AM    EOSPCT 1 02/22/2014 05:01 PM    BASOPCT 0 07/19/2018 11:40 AM    BASOPCT 0 02/22/2014 05:01 PM    NEUTROABS 6 50 07/19/2018 11:40 AM    NEUTROABS 5 32 02/22/2014 05:01 PM    LYMPHSABS 1 31 07/19/2018 11:40 AM    LYMPHSABS 1 82 02/22/2014 05:01 PM    MONOSABS 0 45 07/19/2018 11:40 AM    MONOSABS 0 38 02/22/2014 05:01 PM    EOSABS 0 13 07/19/2018 11:40 AM    EOSABS 0 08 02/22/2014 05:01 PM     Chemistry Profile:       Invalid input(s): EXTSODIUM, EXTPOTASSIUM, EXTCO2, EXTANIONGAP, EXTBUN, EXTCREAT, EXTGLUBLD, GLU, SLAMBGLUCOSE, EXTCALCIUM, CAADJUST, ALBUMIN, SERUMALBUMIN, EXTEGFR    Current Medications     Current Outpatient Medications:     acetaminophen-codeine (TYLENOL WITH CODEINE) 120-12 mg/5 mL suspension, Take 5 mL by mouth every 6 (six) hours as needed, Disp: , Rfl:     albuterol (PROAIR HFA) 90 mcg/act inhaler, Inhale 1 puff every 6 (six) hours as needed for wheezing, Disp: 8 5 g, Rfl: 1    capsicum (ZOSTRIX) 0 075 % topical cream, Apply topically 3 (three) times a day, Disp: 28 3 g, Rfl: 0    diclofenac sodium (VOLTAREN) 1 %, Apply 2 g topically 4 (four) times a day (Patient not taking: Reported on 9/11/2019), Disp: 1 Tube, Rfl: 3    diclofenac sodium (VOLTAREN) 1 %, Apply 2 g topically 4 (four) times a day, Disp: 100 g, Rfl: 2    hydrocortisone 0 5 % cream, Apply topically 2 (two) times a day (Patient not taking: Reported on 9/11/2019), Disp: 30 g, Rfl: 0    meloxicam (MOBIC) 15 mg tablet, Take 1 tablet (15 mg total) by mouth daily, Disp: 14 tablet, Rfl: 0    methocarbamol (ROBAXIN) 500 mg tablet, Take 1 tablet (500 mg total) by mouth 4 (four) times a day, Disp: 90 tablet, Rfl: 0    PARoxetine (PAXIL) 10 mg tablet, Take 10 mg by mouth, Disp: , Rfl:     predniSONE 20 mg tablet, TK 3 TABLETS DAILY FOR 5 DAYS, Disp: , Rfl: 0    simethicone (MYLICON,GAS-X) 735 MG capsule, Take 1 capsule (180 mg total) by mouth 4 (four) times a day as needed for flatulence, Disp: 120 capsule, Rfl: 0    Health Maintenance     Health Maintenance   Topic Date Due    Hepatitis C Screening  1955    BMI: Followup Plan  10/26/1973    MAMMOGRAM  06/08/2018    INFLUENZA VACCINE  07/01/2019    BMI: Adult  03/21/2020    Pneumococcal Vaccine: 65+ Years (1 of 2 - PCV13) 10/26/2020    DTaP,Tdap,and Td Vaccines (2 - Td) 04/01/2021    CRC Screening: Colonoscopy  05/11/2022    Pneumococcal Vaccine: Pediatrics (0 to 5 Years) and At-Risk Patients (6 to 59 Years)  Aged Out    HEPATITIS B VACCINES  Aged [employer] History   Administered Date(s) Administered    INFLUENZA 11/23/2013, 05/13/2014, 03/04/2016, 03/16/2018, 01/04/2019    Tdap 04/01/2011    Tetanus Immune Globulin 04/01/2011       GLADIS Kwong    Internal Medicine PGY-2  9/11/2019 11:17 AM

## 2020-01-30 ENCOUNTER — OFFICE VISIT (OUTPATIENT)
Dept: INTERNAL MEDICINE CLINIC | Facility: CLINIC | Age: 65
End: 2020-01-30

## 2020-01-30 ENCOUNTER — APPOINTMENT (OUTPATIENT)
Dept: LAB | Facility: CLINIC | Age: 65
End: 2020-01-30

## 2020-01-30 VITALS
BODY MASS INDEX: 23.95 KG/M2 | DIASTOLIC BLOOD PRESSURE: 76 MMHG | SYSTOLIC BLOOD PRESSURE: 112 MMHG | HEART RATE: 66 BPM | OXYGEN SATURATION: 96 % | WEIGHT: 143.74 LBS | TEMPERATURE: 97.6 F | HEIGHT: 65 IN

## 2020-01-30 DIAGNOSIS — J45.20 MILD INTERMITTENT ASTHMA WITHOUT COMPLICATION: ICD-10-CM

## 2020-01-30 DIAGNOSIS — Z12.31 SCREENING MAMMOGRAM, ENCOUNTER FOR: ICD-10-CM

## 2020-01-30 DIAGNOSIS — K59.00 CONSTIPATION, UNSPECIFIED CONSTIPATION TYPE: ICD-10-CM

## 2020-01-30 DIAGNOSIS — E78.5 HYPERLIPIDEMIA, UNSPECIFIED HYPERLIPIDEMIA TYPE: ICD-10-CM

## 2020-01-30 DIAGNOSIS — Z23 NEED FOR PNEUMOCOCCAL VACCINATION: Primary | ICD-10-CM

## 2020-01-30 DIAGNOSIS — R53.83 OTHER FATIGUE: ICD-10-CM

## 2020-01-30 LAB
25(OH)D3 SERPL-MCNC: 19.9 NG/ML (ref 30–100)
ALBUMIN SERPL BCP-MCNC: 3.6 G/DL (ref 3.5–5)
ALP SERPL-CCNC: 117 U/L (ref 46–116)
ALT SERPL W P-5'-P-CCNC: 26 U/L (ref 12–78)
ANION GAP SERPL CALCULATED.3IONS-SCNC: 4 MMOL/L (ref 4–13)
AST SERPL W P-5'-P-CCNC: 11 U/L (ref 5–45)
BASOPHILS # BLD AUTO: 0.04 THOUSANDS/ΜL (ref 0–0.1)
BASOPHILS NFR BLD AUTO: 1 % (ref 0–1)
BILIRUB SERPL-MCNC: 0.35 MG/DL (ref 0.2–1)
BUN SERPL-MCNC: 18 MG/DL (ref 5–25)
CALCIUM SERPL-MCNC: 9 MG/DL (ref 8.3–10.1)
CHLORIDE SERPL-SCNC: 109 MMOL/L (ref 100–108)
CHOLEST SERPL-MCNC: 242 MG/DL (ref 50–200)
CO2 SERPL-SCNC: 29 MMOL/L (ref 21–32)
CREAT SERPL-MCNC: 0.72 MG/DL (ref 0.6–1.3)
EOSINOPHIL # BLD AUTO: 0.17 THOUSAND/ΜL (ref 0–0.61)
EOSINOPHIL NFR BLD AUTO: 3 % (ref 0–6)
ERYTHROCYTE [DISTWIDTH] IN BLOOD BY AUTOMATED COUNT: 13.3 % (ref 11.6–15.1)
GFR SERPL CREATININE-BSD FRML MDRD: 89 ML/MIN/1.73SQ M
GLUCOSE P FAST SERPL-MCNC: 112 MG/DL (ref 65–99)
HCT VFR BLD AUTO: 44.8 % (ref 34.8–46.1)
HDLC SERPL-MCNC: 54 MG/DL
HGB BLD-MCNC: 14 G/DL (ref 11.5–15.4)
IMM GRANULOCYTES # BLD AUTO: 0.01 THOUSAND/UL (ref 0–0.2)
IMM GRANULOCYTES NFR BLD AUTO: 0 % (ref 0–2)
LDLC SERPL CALC-MCNC: 158 MG/DL (ref 0–100)
LYMPHOCYTES # BLD AUTO: 1.69 THOUSANDS/ΜL (ref 0.6–4.47)
LYMPHOCYTES NFR BLD AUTO: 34 % (ref 14–44)
MCH RBC QN AUTO: 28.1 PG (ref 26.8–34.3)
MCHC RBC AUTO-ENTMCNC: 31.3 G/DL (ref 31.4–37.4)
MCV RBC AUTO: 90 FL (ref 82–98)
MONOCYTES # BLD AUTO: 0.37 THOUSAND/ΜL (ref 0.17–1.22)
MONOCYTES NFR BLD AUTO: 8 % (ref 4–12)
NEUTROPHILS # BLD AUTO: 2.67 THOUSANDS/ΜL (ref 1.85–7.62)
NEUTS SEG NFR BLD AUTO: 54 % (ref 43–75)
NRBC BLD AUTO-RTO: 0 /100 WBCS
PLATELET # BLD AUTO: 249 THOUSANDS/UL (ref 149–390)
PMV BLD AUTO: 11.5 FL (ref 8.9–12.7)
POTASSIUM SERPL-SCNC: 4.3 MMOL/L (ref 3.5–5.3)
PROT SERPL-MCNC: 7.5 G/DL (ref 6.4–8.2)
RBC # BLD AUTO: 4.99 MILLION/UL (ref 3.81–5.12)
SODIUM SERPL-SCNC: 142 MMOL/L (ref 136–145)
TRIGL SERPL-MCNC: 150 MG/DL
TSH SERPL DL<=0.05 MIU/L-ACNC: 2.78 UIU/ML (ref 0.36–3.74)
WBC # BLD AUTO: 4.95 THOUSAND/UL (ref 4.31–10.16)

## 2020-01-30 PROCEDURE — 82306 VITAMIN D 25 HYDROXY: CPT

## 2020-01-30 PROCEDURE — 90732 PPSV23 VACC 2 YRS+ SUBQ/IM: CPT | Performed by: INTERNAL MEDICINE

## 2020-01-30 PROCEDURE — 90471 IMMUNIZATION ADMIN: CPT | Performed by: INTERNAL MEDICINE

## 2020-01-30 PROCEDURE — 99213 OFFICE O/P EST LOW 20 MIN: CPT | Performed by: INTERNAL MEDICINE

## 2020-01-30 PROCEDURE — 80053 COMPREHEN METABOLIC PANEL: CPT

## 2020-01-30 PROCEDURE — 80061 LIPID PANEL: CPT

## 2020-01-30 PROCEDURE — 36415 COLL VENOUS BLD VENIPUNCTURE: CPT

## 2020-01-30 PROCEDURE — 85025 COMPLETE CBC W/AUTO DIFF WBC: CPT

## 2020-01-30 PROCEDURE — 84443 ASSAY THYROID STIM HORMONE: CPT

## 2020-01-30 RX ORDER — POLYETHYLENE GLYCOL 3350 17 G/17G
17 POWDER, FOR SOLUTION ORAL DAILY
Qty: 14 EACH | Refills: 0 | Status: SHIPPED | OUTPATIENT
Start: 2020-01-30 | End: 2020-09-29

## 2020-01-30 NOTE — PROGRESS NOTES
[facility]  INTERNAL MEDICINE OFFICE VISIT     PATIENT INFORMATION     Joyce Quintana   59 y o  female   MRN: [id number]    ASSESSMENT/PLAN     Diagnoses and all orders for this visit:    Need for pneumococcal vaccination  -     PNEUMOCOCCAL POLYSACCHARIDE VACCINE 23-VALENT =>1YO SQ IM    Fatigue  · Patient has had increasing fatigue  Patient has been trying to do physical activity with exercise on a regular basis however she feels she has less energy  Denies any bleeding in her urine, menstruation, or colon  Denies any headaches  · Not on any medications at this time besides as needed albuterol  · Check CBC, vitamin-D, TSH, and CMP  · If anything comes back low will consider giving supplementations   -     TSH, 3rd generation with Free T4 reflex; Future  -     Vitamin D 25 hydroxy; Future  -     CBC and differential; Future  -     Comprehensive metabolic panel; Future    Constipation, unspecified constipation type  · Patient has a bowel movement once every 2 to 3 days  She associates her all movements with significant abdominal pain as she feels like it takes a lot of effort to go  She has not been on any stool softener before  She has not had any hemorrhoids  She has history of diverticulosis noted on colonoscopy in 2017  · At this time will start MiraLax, recommend increasing fiber intake  And staying hydrated  -     TSH, 3rd generation with Free T4 reflex; Future  -     Vitamin D 25 hydroxy; Future  -     CBC and differential; Future  -     polyethylene glycol (MIRALAX) 17 g packet; Take 17 g by mouth daily  -     Comprehensive metabolic panel; Future    Screening mammogram, encounter for  · Patient gets [facility] at [facility]  -     Mammo screening bilateral w cad; Future    Mild intermittent asthma   · Long-term history of asthma  Well controlled  Uses as needed albuterol one to 3 times a week  · Denies any cough, shortness of breath    · Lungs clear auscultation bilaterally  · Continue same regimen for now  · Patient will receive pneumococcal vaccine 23  Hyperlipidemia, unspecified hyperlipidemia type  · Last lipid panel cholesterol 254, triglycerides 257, HDL 52, and   · She has been off statin therapy  However patient has made significant change in her dietary habits and exercise regimen  · Will recheck lipid panel and continue encourage lifestyle modifications with eating healthy diet with decrease fried food and exercising on a regular basis  -     Lipid Panel with Direct LDL reflex; Future    Schedule a follow-up appointment in 6 months unless sooner    HEALTH MAINTENANCE     Immunization History   Administered Date(s) Administered    INFLUENZA 11/23/2013, 05/13/2014, 03/04/2016, 03/16/2018, 01/04/2019, 12/06/2019    Pneumococcal Polysaccharide PPV23 01/30/2020    Tdap 04/01/2011, 12/06/2019    Tetanus Immune Globulin 04/01/2011     Immunizations:  · Will receive PPV23  Screening:  · UTD with colonscopy, Pap smear, and Mammogram       CHIEF COMPLAINT     Chief Complaint   Patient presents with    Follow-up     routine       HISTORY OF PRESENT ILLNESS     Ms Patrizia Shane is a 66-year-old female with past medical history of mild asthma, hyperlipidemia, and diverticulosis who presents for follow-up of chronic medical conditions and general checkup  Overall from a health perspective patient has been doing really well  She has had minimal use of her albuterol and states her breathing is good  She uses the albuterol inhaler maybe once or twice a week  She denies any cough, shortness of breath, palpitations, nausea, vomiting, or diarrhea  She has had intentional weight loss with lifestyle modifications of eating healthy and exercising on a regular basis  Patient does not smoke cigarettes and drinks alcohol very occasionally  Patient is not currently taking any medications    She states from a mood perspective she does not feel dysphoric or anxious  At times she might get panicky but she is able to manage her emotions with exercising on a regular basis  She has a good appetite, does not feel lightheaded, or have any other discomfort  REVIEW OF SYSTEMS     Review of Systems   Constitutional: Positive for activity change and fatigue  Negative for diaphoresis  HENT: Negative for congestion and sore throat  Eyes: Negative for visual disturbance  Respiratory: Negative for apnea, cough, chest tightness, shortness of breath and wheezing  Cardiovascular: Negative for chest pain, palpitations and leg swelling  Gastrointestinal: Positive for abdominal pain and constipation  Negative for diarrhea, nausea and vomiting  Genitourinary: Negative for difficulty urinating and menstrual problem  Musculoskeletal: Negative for arthralgias  Skin: Negative for color change, pallor and rash  Neurological: Negative for dizziness and weakness  Hematological: Negative for adenopathy  Does not bruise/bleed easily  Psychiatric/Behavioral: Negative for agitation, behavioral problems and dysphoric mood  The patient is not nervous/anxious  OBJECTIVE     Vitals:    01/30/20 0751   BP: 112/76   Pulse: 66   Temp: 97 6 °F (36 4 °C)   SpO2: 96%   Weight: 65 2 kg (143 lb 11 8 oz)   Height: 5' 4 5" (1 638 m)     Physical Exam   Constitutional: She is oriented to person, place, and time  She appears well-developed and well-nourished  No distress  HENT:   Head: Normocephalic and atraumatic  Mouth/Throat: No oropharyngeal exudate  Eyes: Pupils are equal, round, and reactive to light  Conjunctivae are normal  No scleral icterus  Neck: Normal range of motion  Cardiovascular: Normal rate, regular rhythm, normal heart sounds and intact distal pulses  Exam reveals no gallop and no friction rub  No murmur heard  Pulmonary/Chest: Effort normal and breath sounds normal  No stridor  No respiratory distress  She has no wheezes  Abdominal: Soft   Bowel sounds are normal  She exhibits no distension and no mass  There is no tenderness  There is no guarding  Musculoskeletal: Normal range of motion  She exhibits no edema or tenderness  Lymphadenopathy:     She has no cervical adenopathy  Neurological: She is alert and oriented to person, place, and time  No cranial nerve deficit  Skin: Skin is warm and dry  She is not diaphoretic  No erythema  Psychiatric: She has a normal mood and affect  Her behavior is normal    Vitals reviewed  CURRENT MEDICATIONS     Current Outpatient Medications:     albuterol (PROAIR HFA) 90 mcg/act inhaler, Inhale 1 puff every 6 (six) hours as needed for wheezing, Disp: 8 5 g, Rfl: 1    polyethylene glycol (MIRALAX) 17 g packet, Take 17 g by mouth daily, Disp: 14 each, Rfl: 0    PAST MEDICAL & SURGICAL HISTORY     Past Medical History:   Diagnosis Date    Asthma     Back pain     Bowel incontinence     Chronic pain     History of breast implant     Psychiatric disorder     panic attacks     Past Surgical History:   Procedure Laterality Date    AUGMENTATION MAMMAPLASTY      BREAST SURGERY      lift    MT COLONOSCOPY FLX DX W/COLLJ SPEC WHEN PFRMD N/A 5/11/2017    Procedure: COLONOSCOPY;  Surgeon: Gage Escobar MD;  Location: [facility];   Service: Gastroenterology     SOCIAL & FAMILY HISTORY     Social History     Socioeconomic History    Marital status: Single     Spouse name: Not on file    Number of children: Not on file    Years of education: Not on file    Highest education level: Not on file   Occupational History    Not on file   Social Needs    Financial resource strain: Not on file    Food insecurity:     Worry: Not on file     Inability: Not on file    Transportation needs:     Medical: Not on file     Non-medical: Not on file   Tobacco Use    Smoking status: Never Smoker    Smokeless tobacco: Never Used   Substance and Sexual Activity    Alcohol use: No    Drug use: No    Sexual activity: Not Currently   Lifestyle    Physical activity:     Days per week: Not on file     Minutes per session: Not on file    Stress: Not on file   Relationships    Social connections:     Talks on phone: Not on file     Gets together: Not on file     Attends Jewish service: Not on file     Active member of club or organization: Not on file     Attends meetings of clubs or organizations: Not on file     Relationship status: Not on file    Intimate partner violence:     Fear of current or ex partner: Not on file     Emotionally abused: Not on file     Physically abused: Not on file     Forced sexual activity: Not on file   Other Topics Concern    Not on file   Social History Narrative    Financial status inadequate or marginal      Social History     Substance and Sexual Activity   Alcohol Use No       Social History     Substance and Sexual Activity   Drug Use No     Social History     Tobacco Use   Smoking Status Never Smoker   Smokeless Tobacco Never Used     Family History   Problem Relation Age of Onset    Lung cancer Mother     Asthma Sister      ==  Binu Carr MD  Resident, PGY-3  [facility]  [address]   [facility] , [facility] [address] [facility] [address] [facility]  Office: [phone]  Fax: [phone]

## 2020-03-02 DIAGNOSIS — E55.9 VITAMIN D DEFICIENCY: Primary | ICD-10-CM

## 2020-03-02 RX ORDER — ERGOCALCIFEROL 1.25 MG/1
50000 CAPSULE ORAL WEEKLY
Qty: 8 CAPSULE | Refills: 0 | Status: SHIPPED | OUTPATIENT
Start: 2020-03-02 | End: 2021-04-28

## 2020-03-13 ENCOUNTER — TELEPHONE (OUTPATIENT)
Dept: INTERNAL MEDICINE CLINIC | Facility: CLINIC | Age: 65
End: 2020-03-13

## 2020-03-13 NOTE — TELEPHONE ENCOUNTER
I call the patient and give her the information and the recommendations  Patient said the she was confused about the vitamin d because nobody call her to inform her the she was low in vitamin d   She said the she only received the call form the pharmacy saying that a prescription is ready for  and she thought the was regular vitamin d to be taken daily  So she takes the vitamin d2 50,000 units in 8 days  She said the she notices the she take it wrong when she went she take her friend to the pharmacy and the pharmacist inform the friend that she have the Vitamin d2 ready for  and give her the direction and how to take it  Ramezdonis Norman said but am taking the same ones but daily and the pharmacy instruct the patient to call her PCP and find out how she needs to take it  Claudene Carpen said the she is not having any symptoms but the she is gong to be monitoring her self because she feel scare the something is going to happen  with her liver   I explain to the patient to follow the doctors instructions is anything the she feels any of the symptoms to call here or go to the ER  I explain to the patient in the future if she received medication and she is not sure how to take it to call the clinic and we can help      Patient verbally understand ,no questions at this time

## 2020-03-13 NOTE — TELEPHONE ENCOUNTER
The liver has the capacity to clear it and store it  Excess Vitamin D can then be stored in fat cells, and when those become saturated, it remains in the blood and is converted to toxic levels  Unfortunately the dose at which vitamin D becomes toxic is not clear  So I would suggest to hold it for couple of weeks and then restart it one capsule weekly  Meanwhile watch for symptoms of toxicity including muscle weakness, anorexia, vomiting, increased thirst and urination and confusion  If she experienced those symptoms call the clinic to have it rechecked and manage accordingly       Simba Romano

## 2020-03-13 NOTE — TELEPHONE ENCOUNTER
Mike Combs 1955 CALLING WITH CONCERNS, SHE STATED SHE TOOK ergocalciferol (VITAMIN D2) 50,000 units  INCORRECTLY   SHE WAS INSTRUCTED TO TAKE IT 1 CAPSULE BY MOUTH ONCE A WEEK AND SHE TOOK 1 A DAY FOR 8 DAYS BACK TO BACK  SHE IS CONCERNED SHE MAY HAVE A REACTION OR SOMETHING MAY HAPPEN FOR MISUSING THE VITAMIN  SHE HAS BEEN EXPERIENCING SEVERE HEADACHES FOR THE PAST 2 DAYS

## 2020-06-04 ENCOUNTER — TELEPHONE (OUTPATIENT)
Dept: INTERNAL MEDICINE CLINIC | Facility: CLINIC | Age: 65
End: 2020-06-04

## 2020-06-19 ENCOUNTER — TELEMEDICINE (OUTPATIENT)
Dept: INTERNAL MEDICINE CLINIC | Facility: CLINIC | Age: 65
End: 2020-06-19

## 2020-06-19 DIAGNOSIS — E55.9 VITAMIN D DEFICIENCY: ICD-10-CM

## 2020-06-19 DIAGNOSIS — E78.5 HYPERLIPIDEMIA, UNSPECIFIED HYPERLIPIDEMIA TYPE: Primary | Chronic | ICD-10-CM

## 2020-06-19 DIAGNOSIS — Z00.00 HEALTHCARE MAINTENANCE: ICD-10-CM

## 2020-06-19 PROCEDURE — 99214 OFFICE O/P EST MOD 30 MIN: CPT | Performed by: INTERNAL MEDICINE

## 2020-06-19 RX ORDER — ATORVASTATIN CALCIUM 10 MG/1
10 TABLET, FILM COATED ORAL DAILY
Qty: 90 TABLET | Refills: 3 | Status: SHIPPED | OUTPATIENT
Start: 2020-06-19 | End: 2021-04-28

## 2020-07-23 ENCOUNTER — TELEPHONE (OUTPATIENT)
Dept: INTERNAL MEDICINE CLINIC | Facility: CLINIC | Age: 65
End: 2020-07-23

## 2020-07-23 ENCOUNTER — APPOINTMENT (OUTPATIENT)
Dept: LAB | Facility: HOSPITAL | Age: 65
End: 2020-07-23

## 2020-07-23 DIAGNOSIS — E55.9 VITAMIN D DEFICIENCY: ICD-10-CM

## 2020-07-23 DIAGNOSIS — Z00.00 HEALTHCARE MAINTENANCE: ICD-10-CM

## 2020-07-23 LAB
25(OH)D3 SERPL-MCNC: 23.5 NG/ML (ref 30–100)
HCV AB SER QL: NORMAL

## 2020-07-23 PROCEDURE — 82306 VITAMIN D 25 HYDROXY: CPT

## 2020-07-23 PROCEDURE — 36415 COLL VENOUS BLD VENIPUNCTURE: CPT

## 2020-07-23 PROCEDURE — 87389 HIV-1 AG W/HIV-1&-2 AB AG IA: CPT

## 2020-07-23 PROCEDURE — 86803 HEPATITIS C AB TEST: CPT

## 2020-07-23 NOTE — TELEPHONE ENCOUNTER
Patient has been experiencing left breast pain for several days   She has an order for a routine mammogram   She would like a new script for an ultrasound or extensive mammogram to check the cause of pain

## 2020-07-23 NOTE — TELEPHONE ENCOUNTER
Attempted to reach the patient via [employer] video, when she did not respond I called her phone and she did not answer again  Left voicemail

## 2020-07-24 LAB — HIV 1+2 AB+HIV1 P24 AG SERPL QL IA: NORMAL

## 2020-08-24 ENCOUNTER — TELEPHONE (OUTPATIENT)
Dept: INTERNAL MEDICINE CLINIC | Facility: CLINIC | Age: 65
End: 2020-08-24

## 2020-08-24 NOTE — TELEPHONE ENCOUNTER
Patient is insisting on getting order put in system for covid ,states she wasn't feeling well a month ago and wants to get tested  Please review and contact patient     I did advise patient she has to have symptoms or require it for work , travel, in order for dr to put order but patient insist

## 2020-08-24 NOTE — TELEPHONE ENCOUNTER
Is the patient requesting the antibody testing to see if she had COVID in the past or does she want to see if she currently has COVID   Can you please call and confirm with her    If she wants antibody testing then she will need an appt with the

## 2020-09-29 ENCOUNTER — OFFICE VISIT (OUTPATIENT)
Dept: INTERNAL MEDICINE CLINIC | Facility: CLINIC | Age: 65
End: 2020-09-29

## 2020-09-29 VITALS
BODY MASS INDEX: 26.5 KG/M2 | DIASTOLIC BLOOD PRESSURE: 78 MMHG | SYSTOLIC BLOOD PRESSURE: 128 MMHG | HEART RATE: 92 BPM | HEIGHT: 64 IN | WEIGHT: 155.2 LBS | TEMPERATURE: 96.5 F | OXYGEN SATURATION: 96 %

## 2020-09-29 DIAGNOSIS — Z00.00 HEALTHCARE MAINTENANCE: ICD-10-CM

## 2020-09-29 DIAGNOSIS — J45.20 MILD INTERMITTENT ASTHMA WITHOUT COMPLICATION: ICD-10-CM

## 2020-09-29 DIAGNOSIS — E78.5 HYPERLIPIDEMIA, UNSPECIFIED HYPERLIPIDEMIA TYPE: Chronic | ICD-10-CM

## 2020-09-29 DIAGNOSIS — Z00.00 ANNUAL PHYSICAL EXAM: Primary | ICD-10-CM

## 2020-09-29 PROCEDURE — 99396 PREV VISIT EST AGE 40-64: CPT | Performed by: INTERNAL MEDICINE

## 2020-09-29 RX ORDER — ALBUTEROL SULFATE 90 UG/1
2 AEROSOL, METERED RESPIRATORY (INHALATION) EVERY 4 HOURS PRN
COMMUNITY
Start: 2016-11-25 | End: 2020-09-29

## 2020-09-29 RX ORDER — ALBUTEROL SULFATE 90 UG/1
1 AEROSOL, METERED RESPIRATORY (INHALATION) EVERY 6 HOURS PRN
Qty: 8.5 G | Refills: 1 | Status: SHIPPED | OUTPATIENT
Start: 2020-09-29 | End: 2020-11-13

## 2020-09-29 NOTE — PROGRESS NOTES
[facility]    NAME: Leigha Maxwell  AGE: 59 y o  SEX: female  : 1955     DATE: 2020     Assessment and Plan:     Problem List Items Addressed This Visit        Respiratory    Asthma    Relevant Medications    albuterol (ProAir HFA) 90 mcg/act inhaler       Other    Hyperlipidemia (Chronic)      Other Visit Diagnoses     Annual physical exam    -  Primary    Healthcare maintenance              Annual physical   Patient is general good health  Screenings as described below  Follow-up in 6 months for routine screenings, lab work    Hyperlipidemia  Patient did not initiate Lipitor 10 mg daily, wanted to see if she can modify her diet 1st   Patient's ASCVD risk score is 5 6%, LDL is 159  Did convince patient to initiate statin therapy at this time, will resume Lipitor 10 mg daily  Recheck lipid panel at next visit  Continue diet and lifestyle modification    Asthma  Intermittent, requesting refill for albuterol HFA  Last use a few months ago refill given    Healthcare maintenance  Mammogram ordered  Influenza vaccine received 2 weeks ago    Patient requested COVID testing at this visit, she has no symptoms has had no recent contact with covered positive persons  Counseled patient on testing indications, patient stated that she understood but would pursue outside means of testing  Immunizations and preventive care screenings were discussed with patient today  Appropriate education was printed on patient's after visit summary  Counseling:  Alcohol/drug use: discussed moderation in alcohol intake, the recommendations for healthy alcohol use, and avoidance of illicit drug use  · Dental Health: discussed importance of regular tooth brushing, flossing, and dental visits  BMI Counseling: Body mass index is 26 64 kg/m²   The BMI is above normal  Nutrition recommendations include decreasing portion sizes, encouraging healthy choices of fruits and vegetables and decreasing fast food intake  Exercise recommendations include moderate physical activity 150 minutes/week  No pharmacotherapy was ordered  Return in 6 months (on 3/29/2021) for Recheck  Chief Complaint:     Chief Complaint   Patient presents with    Follow-up      History of Present Illness:     Adult Annual Physical   Patient here for a comprehensive physical exam   26-year-old female past medical history of hyperlipidemia, depression, intermittent asthma presents to office for annual physical   She is in general good health  Her diet is well controlled  She exercises daily  She denied initiate statin therapy, after conversation with her sister recommended only diet modification  She received flu vaccine 2 weeks ago, was requesting high-dose flu vaccine this visit  She is also requesting cover testing  She denies any symptoms of fever, chills, chest pain, shortness of breath, nausea, vomiting, loss of taste or smell  Last travel to [place] 4 weeks ago, denies any contact with COVID positive people  Diet and Physical Activity  · Diet/Nutrition: well balanced diet  · Exercise: walking  Depression Screening  PHQ-9 Depression Screening    PHQ-9:    Frequency of the following problems over the past two weeks:            General Health  · Sleep: sleeps well  · Hearing: normal - bilateral   · Vision: no vision problems  · Dental: brushes teeth twice daily  /GYN Health  · Patient is: postmenopausal  · Last menstrual period:   · Contraceptive method: N/A     Review of Systems:     Review of Systems   Constitutional: Negative for chills and fever  HENT: Negative for congestion and sinus pain  Respiratory: Negative for cough and shortness of breath  Cardiovascular: Negative for chest pain, palpitations and leg swelling  Gastrointestinal: Negative for abdominal pain, constipation, diarrhea, nausea and vomiting  Genitourinary: Negative for dysuria and hematuria  Musculoskeletal: Negative for arthralgias and back pain  Skin: Negative for color change and rash  Neurological: Negative for dizziness and numbness  Psychiatric/Behavioral: Negative for agitation and confusion  all other systems reviewed were negative  Past Medical History:     Past Medical History:   Diagnosis Date    Asthma     Back pain     Bowel incontinence     Chronic pain     History of breast implant     Psychiatric disorder     panic attacks      Past Surgical History:     Past Surgical History:   Procedure Laterality Date    AUGMENTATION MAMMAPLASTY      BREAST SURGERY      lift    [place] COLONOSCOPY FLX DX W/COLLJ SPEC WHEN PFRMD N/A 5/11/2017    Procedure: COLONOSCOPY;  Surgeon: Frantz Corbin MD;  Location: [facility];   Service: Gastroenterology      Social History:        Social History     Socioeconomic History    Marital status: Single     Spouse name: None    Number of children: None    Years of education: None    Highest education level: None   Occupational History    None   Social Needs    Financial resource strain: None    Food insecurity     Worry: None     Inability: None    Transportation needs     Medical: None     Non-medical: None   Tobacco Use    Smoking status: Never Smoker    Smokeless tobacco: Never Used   Substance and Sexual Activity    Alcohol use: No    Drug use: No    Sexual activity: Not Currently   Lifestyle    Physical activity     Days per week: None     Minutes per session: None    Stress: None   Relationships    Social connections     Talks on phone: None     Gets together: None     Attends Amish service: None     Active member of club or organization: None     Attends meetings of clubs or organizations: None     Relationship status: None    Intimate partner violence     Fear of current or ex partner: None     Emotionally abused: None     Physically abused: None     Forced sexual activity: None   Other Topics Concern    None   Social History Narrative    Financial status inadequate or marginal       Family History:     Family History   Problem Relation Age of Onset    Lung cancer Mother     Asthma Sister       Current Medications:     Current Outpatient Medications   Medication Sig Dispense Refill    albuterol (ProAir HFA) 90 mcg/act inhaler Inhale 1 puff every 6 (six) hours as needed for wheezing 8 5 g 1    ergocalciferol (VITAMIN D2) 50,000 units Take 1 capsule (50,000 Units total) by mouth once a week for 8 doses 8 capsule 0    atorvastatin (LIPITOR) 10 mg tablet Take 1 tablet (10 mg total) by mouth daily (Patient not taking: Reported on 9/29/2020) 90 tablet 3     No current facility-administered medications for this visit  Allergies:     No Known Allergies   Physical Exam:     /78 (BP Location: Right arm, Patient Position: Sitting, Cuff Size: Adult)   Pulse 92   Temp (!) 96 5 °F (35 8 °C) (Temporal)   Ht 5' 4" (1 626 m)   Wt 70 4 kg (155 lb 3 3 oz)   SpO2 96%   BMI 26 64 kg/m²     Physical Exam  Constitutional:       General: She is not in acute distress  Appearance: Normal appearance  She is not ill-appearing  HENT:      Head: Normocephalic and atraumatic  Mouth/Throat:      Mouth: Mucous membranes are moist    Eyes:      Extraocular Movements: Extraocular movements intact  Conjunctiva/sclera: Conjunctivae normal       Pupils: Pupils are equal, round, and reactive to light  Cardiovascular:      Rate and Rhythm: Normal rate and regular rhythm  Pulses: Normal pulses  Heart sounds: Normal heart sounds  No murmur  No friction rub  No gallop  Pulmonary:      Effort: Pulmonary effort is normal  No respiratory distress  Breath sounds: Normal breath sounds  No wheezing or rales  Abdominal:      General: Abdomen is flat  Bowel sounds are normal  There is no distension  Palpations: Abdomen is soft  Tenderness:  There is no abdominal tenderness  There is no guarding  Musculoskeletal:      Right lower leg: No edema  Left lower leg: No edema  Skin:     General: Skin is warm and dry  Neurological:      General: No focal deficit present  Mental Status: She is alert and oriented to person, place, and time     Psychiatric:         Mood and Affect: Mood normal          Behavior: Behavior normal           Vipul Vega DO  [address]

## 2020-09-29 NOTE — PATIENT INSTRUCTIONS

## 2020-11-11 ENCOUNTER — TELEPHONE (OUTPATIENT)
Dept: INTERNAL MEDICINE CLINIC | Facility: CLINIC | Age: 65
End: 2020-11-11

## 2020-11-13 DIAGNOSIS — J45.20 MILD INTERMITTENT ASTHMA WITHOUT COMPLICATION: Primary | ICD-10-CM

## 2020-11-13 RX ORDER — ALBUTEROL SULFATE 90 UG/1
2 AEROSOL, METERED RESPIRATORY (INHALATION) EVERY 6 HOURS PRN
Qty: 18 G | Refills: 1 | Status: SHIPPED | OUTPATIENT
Start: 2020-11-13

## 2020-12-16 ENCOUNTER — TELEPHONE (OUTPATIENT)
Dept: INTERNAL MEDICINE CLINIC | Facility: CLINIC | Age: 65
End: 2020-12-16

## 2021-03-29 ENCOUNTER — IMMUNIZATIONS (OUTPATIENT)
Dept: FAMILY MEDICINE CLINIC | Facility: HOSPITAL | Age: 66
End: 2021-03-29

## 2021-03-29 DIAGNOSIS — Z23 ENCOUNTER FOR IMMUNIZATION: Primary | ICD-10-CM

## 2021-03-29 PROCEDURE — 91301 SARS-COV-2 / COVID-19 MRNA VACCINE (MODERNA) 100 MCG: CPT

## 2021-03-29 PROCEDURE — 0011A SARS-COV-2 / COVID-19 MRNA VACCINE (MODERNA) 100 MCG: CPT

## 2021-04-28 ENCOUNTER — APPOINTMENT (OUTPATIENT)
Dept: LAB | Facility: HOSPITAL | Age: 66
End: 2021-04-28

## 2021-04-28 ENCOUNTER — OFFICE VISIT (OUTPATIENT)
Dept: INTERNAL MEDICINE CLINIC | Facility: CLINIC | Age: 66
End: 2021-04-28

## 2021-04-28 VITALS
BODY MASS INDEX: 26.43 KG/M2 | DIASTOLIC BLOOD PRESSURE: 79 MMHG | SYSTOLIC BLOOD PRESSURE: 117 MMHG | TEMPERATURE: 97.9 F | HEART RATE: 75 BPM | WEIGHT: 154 LBS

## 2021-04-28 DIAGNOSIS — Z12.31 SCREENING MAMMOGRAM, ENCOUNTER FOR: ICD-10-CM

## 2021-04-28 DIAGNOSIS — Z11.3 SCREENING FOR STD (SEXUALLY TRANSMITTED DISEASE): ICD-10-CM

## 2021-04-28 DIAGNOSIS — Z00.00 HEALTHCARE MAINTENANCE: ICD-10-CM

## 2021-04-28 DIAGNOSIS — E78.5 HYPERLIPIDEMIA, UNSPECIFIED HYPERLIPIDEMIA TYPE: Chronic | ICD-10-CM

## 2021-04-28 DIAGNOSIS — Z00.00 HEALTHCARE MAINTENANCE: Primary | ICD-10-CM

## 2021-04-28 LAB
25(OH)D3 SERPL-MCNC: 30 NG/ML (ref 30–100)
ALBUMIN SERPL BCP-MCNC: 3.5 G/DL (ref 3.5–5)
ALP SERPL-CCNC: 106 U/L (ref 46–116)
ALT SERPL W P-5'-P-CCNC: 28 U/L (ref 12–78)
ANION GAP SERPL CALCULATED.3IONS-SCNC: 5 MMOL/L (ref 4–13)
AST SERPL W P-5'-P-CCNC: 9 U/L (ref 5–45)
BILIRUB SERPL-MCNC: 0.39 MG/DL (ref 0.2–1)
BUN SERPL-MCNC: 22 MG/DL (ref 5–25)
CALCIUM SERPL-MCNC: 9.3 MG/DL (ref 8.3–10.1)
CHLORIDE SERPL-SCNC: 109 MMOL/L (ref 100–108)
CHOLEST SERPL-MCNC: 271 MG/DL (ref 50–200)
CO2 SERPL-SCNC: 25 MMOL/L (ref 21–32)
CREAT SERPL-MCNC: 0.77 MG/DL (ref 0.6–1.3)
ERYTHROCYTE [DISTWIDTH] IN BLOOD BY AUTOMATED COUNT: 13.2 % (ref 11.6–15.1)
EST. AVERAGE GLUCOSE BLD GHB EST-MCNC: 154 MG/DL
GFR SERPL CREATININE-BSD FRML MDRD: 81 ML/MIN/1.73SQ M
GLUCOSE P FAST SERPL-MCNC: 162 MG/DL (ref 65–99)
HBA1C MFR BLD: 7 %
HCT VFR BLD AUTO: 44.2 % (ref 34.8–46.1)
HDLC SERPL-MCNC: 55 MG/DL
HGB BLD-MCNC: 13.9 G/DL (ref 11.5–15.4)
LDLC SERPL CALC-MCNC: 183 MG/DL (ref 0–100)
MCH RBC QN AUTO: 28.3 PG (ref 26.8–34.3)
MCHC RBC AUTO-ENTMCNC: 31.4 G/DL (ref 31.4–37.4)
MCV RBC AUTO: 90 FL (ref 82–98)
PLATELET # BLD AUTO: 302 THOUSANDS/UL (ref 149–390)
PMV BLD AUTO: 10.8 FL (ref 8.9–12.7)
POTASSIUM SERPL-SCNC: 4.1 MMOL/L (ref 3.5–5.3)
PROT SERPL-MCNC: 7.9 G/DL (ref 6.4–8.2)
RBC # BLD AUTO: 4.92 MILLION/UL (ref 3.81–5.12)
SODIUM SERPL-SCNC: 139 MMOL/L (ref 136–145)
TRIGL SERPL-MCNC: 166 MG/DL
WBC # BLD AUTO: 5.93 THOUSAND/UL (ref 4.31–10.16)

## 2021-04-28 PROCEDURE — 80061 LIPID PANEL: CPT

## 2021-04-28 PROCEDURE — 87591 N.GONORRHOEAE DNA AMP PROB: CPT

## 2021-04-28 PROCEDURE — 87491 CHLMYD TRACH DNA AMP PROBE: CPT

## 2021-04-28 PROCEDURE — 82306 VITAMIN D 25 HYDROXY: CPT

## 2021-04-28 PROCEDURE — 36415 COLL VENOUS BLD VENIPUNCTURE: CPT

## 2021-04-28 PROCEDURE — 80053 COMPREHEN METABOLIC PANEL: CPT

## 2021-04-28 PROCEDURE — 99213 OFFICE O/P EST LOW 20 MIN: CPT | Performed by: INTERNAL MEDICINE

## 2021-04-28 PROCEDURE — 85027 COMPLETE CBC AUTOMATED: CPT

## 2021-04-28 PROCEDURE — 83036 HEMOGLOBIN GLYCOSYLATED A1C: CPT

## 2021-04-28 NOTE — PROGRESS NOTES
INTERNAL MEDICINE FOLLOW-UP OFFICE VISIT  [facility]  [address] [place] [address]    NAME: Cathy Brewer  AGE: 72 y o  SEX: female    DATE OF ENCOUNTER: 4/28/2021    Assessment and Plan     1  Healthcare maintenance    Patient in general good health,  No medical complaints at this time  · Will order screening labs including CBC,  CMP, vitamin-D, A1c   · Patient is due for mammogram,  ordered  · Follow-up in 1 year for annual physical  - CBC and Platelet; Future  - Comprehensive metabolic panel; Future  - Vitamin D 25 hydroxy; Future  - HEMOGLOBIN A1C W/ EAG ESTIMATION; Future    2  Hyperlipidemia, unspecified hyperlipidemia type   previous  and 1/2020,  ASCVD risk score 5 2%   patient elected to do diet and lifestyle modification at the time  · Recheck lipid panel, instructed patient that should her ASCVD risk score be 7 5 or above she is recommended to start Lipitor 10 mg daily, she is agreeable to this plan  ·  continue diet lifestyle modifications  - Lipid Panel with Direct LDL reflex; Future    3  Screening mammogram, encounter for   Ordered  - Mammo screening bilateral w cad; Future    4  Screening for STD (sexually transmitted disease)  Patient requested to have testing done for globe median gonorrhea,  ordered  HIV and hepatitis-C screening recently negative  - Chlamydia/GC amplified DNA by PCR; Future    BMI Counseling: Body mass index is 26 43 kg/m²  The BMI is above normal  Nutrition recommendations include decreasing portion sizes, encouraging healthy choices of fruits and vegetables and decreasing fast food intake  Exercise recommendations include moderate physical activity 150 minutes/week           PHQ-9 Depression Screening    PHQ-9:   Frequency of the following problems over the past two weeks:      Little interest or pleasure in doing things: 0 - not at all  Feeling down, depressed, or hopeless: 0 - not at all  Trouble falling or staying asleep, or sleeping too much: 0 - not at all  Feeling tired or having little energy: 0 - not at all  Poor appetite or overeatin - not at all  Feeling bad about yourself - or that you are a failure or have let yourself or your family down: 0 - not at all  Trouble concentrating on things, such as reading the newspaper or watching television: 0 - not at all  Moving or speaking so slowly that other people could have noticed  Or the opposite - being so fidgety or restless that you have been moving around a lot more than usual: 0 - not at all  Thoughts that you would be better off dead, or of hurting yourself in some way: 0 - not at all  PHQ-2 Score: 0  PHQ-9 Score: 0       Orders Placed This Encounter   Procedures    Chlamydia/GC amplified DNA by PCR    Mammo screening bilateral w cad    CBC and Platelet    Comprehensive metabolic panel    Lipid Panel with Direct LDL reflex    Vitamin D 25 hydroxy    HEMOGLOBIN A1C W/ EAG ESTIMATION       Chief Complaint     Chief Complaint   Patient presents with    Follow-up       History of Present Illness      77-year-old female with past medical history of hyperlipidemia,  Mild intermittent asthma who is presenting to office for follow-up and Healthcare maintenance  She is in general good health and denies any medical complaints  She has modified her diet given her high cholesterol  She has increased her exercise regimen  She does not take Lipitor currently as she wishes to modify her diet and lifestyle to see if it can affect her hyperlipidemia  She request mammogram be reordered  She also requests STD screening, sexually active with 1 partner  Will be receiving her COVID vaccination  No signs of depression        The following portions of the patient's history were reviewed and updated as appropriate: allergies, current medications, past family history, past medical history, past social history, past surgical history and problem list     Review of Systems ROS  CONSTITUTIONAL: Denies any fever, chills, rigors, and weight loss  HEENT: No earache or tinnitus  Denies hearing loss  CARDIOVASCULAR: No chest pain or palpitations  RESPIRATORY: Denies any cough, hemoptysis, shortness of breath or dyspnea on exertion  GASTROINTESTINAL: Denies abdominal pain, nausea, vomitng   NEUROLOGIC: No dizziness or vertigo, denies headaches  MUSCULOSKELETAL: Denies any muscle or joint pain  SKIN: Denies skin rashes or itching  All other systems reviewed and were negative  Medical History     Past Medical History:   Diagnosis Date    Asthma     Back pain     Bowel incontinence     Chronic pain     History of breast implant     Psychiatric disorder     panic attacks     Past Surgical History:   Procedure Laterality Date    AUGMENTATION MAMMAPLASTY      BREAST SURGERY      lift    DC COLONOSCOPY FLX DX W/COLLJ SPEC WHEN PFRMD N/A 5/11/2017    Procedure: COLONOSCOPY;  Surgeon: Nils Moritz, MD;  Location: [facility];   Service: Gastroenterology       Current Outpatient Medications:     albuterol (Ventolin HFA) 90 mcg/act inhaler, Inhale 2 puffs every 6 (six) hours as needed for wheezing, Disp: 18 g, Rfl: 1   Family History   Problem Relation Age of Onset    Lung cancer Mother     Asthma Sister       Social History     Socioeconomic History    Marital status: Single     Spouse name: None    Number of children: None    Years of education: None    Highest education level: None   Occupational History    None   Social Needs    Financial resource strain: None    Food insecurity     Worry: None     Inability: None    Transportation needs     Medical: None     Non-medical: None   Tobacco Use    Smoking status: Never Smoker    Smokeless tobacco: Never Used   Substance and Sexual Activity    Alcohol use: No    Drug use: No    Sexual activity: Not Currently   Lifestyle    Physical activity     Days per week: None     Minutes per session: None    Stress: None   Relationships    Social connections     Talks on phone: None     Gets together: None     Attends [employer] witness service: None     Active member of club or organization: None     Attends meetings of clubs or organizations: None     Relationship status: None    Intimate partner violence     Fear of current or ex partner: None     Emotionally abused: None     Physically abused: None     Forced sexual activity: None   Other Topics Concern    None   Social History Narrative    Financial status inadequate or marginal       No Known Allergies     Active Problem List     Patient Active Problem List   Diagnosis    Asthma    Depression    Lumbar radiculopathy    Panic disorder    Seasonal allergies    Diverticulosis of large intestine    Other insomnia    Hyperlipidemia    Need for pneumococcal vaccination    Other fatigue    Constipation    Screening mammogram, encounter for       Objective     /79 (BP Location: Left arm, Patient Position: Sitting, Cuff Size: Standard)   Pulse 75   Temp 97 9 °F (36 6 °C) (Temporal)   Wt 69 9 kg (154 lb)   BMI 26 43 kg/m²     Physical Exam  Constitutional:       General: She is not in acute distress  Appearance: Normal appearance  She is not ill-appearing  HENT:      Head: Normocephalic and atraumatic  Mouth/Throat:      Mouth: Mucous membranes are moist    Eyes:      Extraocular Movements: Extraocular movements intact  Conjunctiva/sclera: Conjunctivae normal       Pupils: Pupils are equal, round, and reactive to light  Cardiovascular:      Rate and Rhythm: Normal rate and regular rhythm  Pulses: Normal pulses  Heart sounds: Normal heart sounds  No murmur  No friction rub  No gallop  Pulmonary:      Effort: Pulmonary effort is normal  No respiratory distress  Breath sounds: Normal breath sounds  No wheezing or rales  Abdominal:      General: Abdomen is flat  Bowel sounds are normal  There is no distension        Palpations: Abdomen is soft  Tenderness: There is no abdominal tenderness  There is no guarding  Musculoskeletal:      Right lower leg: No edema  Left lower leg: No edema  Skin:     General: Skin is warm and dry  Neurological:      General: No focal deficit present  Mental Status: She is alert and oriented to person, place, and time     Psychiatric:         Mood and Affect: Mood normal          Behavior: Behavior normal           Pertinent Laboratory/Diagnostic Studies:  CBC:   Lab Results   Component Value Date/Time    WBC 4 95 01/30/2020 08:46 AM    WBC 7 60 02/22/2014 05:01 PM    RBC 4 99 01/30/2020 08:46 AM    RBC 4 79 02/22/2014 05:01 PM    HGB 14 0 01/30/2020 08:46 AM    HGB 14 2 02/22/2014 05:01 PM    HCT 44 8 01/30/2020 08:46 AM    HCT 41 9 02/22/2014 05:01 PM    MCV 90 01/30/2020 08:46 AM    MCV 88 02/22/2014 05:01 PM    MCH 28 1 01/30/2020 08:46 AM    MCH 29 6 02/22/2014 05:01 PM    MCHC 31 3 (L) 01/30/2020 08:46 AM    MCHC 33 9 02/22/2014 05:01 PM    RDW 13 3 01/30/2020 08:46 AM    RDW 13 4 02/22/2014 05:01 PM    MPV 11 5 01/30/2020 08:46 AM    MPV 10 5 02/22/2014 05:01 PM     01/30/2020 08:46 AM     02/22/2014 05:01 PM    NRBC 0 01/30/2020 08:46 AM    NEUTOPHILPCT 54 01/30/2020 08:46 AM    NEUTOPHILPCT 70 02/22/2014 05:01 PM    LYMPHOPCT 34 01/30/2020 08:46 AM    LYMPHOPCT 24 02/22/2014 05:01 PM    MONOPCT 8 01/30/2020 08:46 AM    MONOPCT 5 02/22/2014 05:01 PM    EOSPCT 3 01/30/2020 08:46 AM    EOSPCT 1 02/22/2014 05:01 PM    BASOPCT 1 01/30/2020 08:46 AM    BASOPCT 0 02/22/2014 05:01 PM    NEUTROABS 2 67 01/30/2020 08:46 AM    NEUTROABS 5 32 02/22/2014 05:01 PM    LYMPHSABS 1 69 01/30/2020 08:46 AM    LYMPHSABS 1 82 02/22/2014 05:01 PM    MONOSABS 0 37 01/30/2020 08:46 AM    MONOSABS 0 38 02/22/2014 05:01 PM    EOSABS 0 17 01/30/2020 08:46 AM    EOSABS 0 08 02/22/2014 05:01 PM     Chemistry Profile:   Lab Results   Component Value Date/Time     02/22/2014 05:01 PM    K 4 3 01/30/2020 08:46 AM    K 4 2 02/22/2014 05:01 PM     (H) 01/30/2020 08:46 AM     02/22/2014 05:01 PM    CO2 29 01/30/2020 08:46 AM    CO2 23 06/15/2016 02:27 AM    ANIONGAP 4 02/22/2014 05:01 PM    BUN 18 01/30/2020 08:46 AM    BUN 20 02/22/2014 05:01 PM    CREATININE 0 72 01/30/2020 08:46 AM    CREATININE 0 87 02/22/2014 05:01 PM    GLUC 113 07/19/2018 11:40 AM    GLUF 112 (H) 01/30/2020 08:46 AM    GLUCOSE 112 06/15/2016 02:27 AM    GLUCOSE 103 02/22/2014 05:01 PM    CALCIUM 9 0 01/30/2020 08:46 AM    CALCIUM 9 6 02/22/2014 05:01 PM    MG 2 4 02/22/2014 05:01 PM    AST 11 01/30/2020 08:46 AM    AST 14 02/22/2014 05:01 PM    ALT 26 01/30/2020 08:46 AM    ALT 24 02/22/2014 05:01 PM    ALKPHOS 117 (H) 01/30/2020 08:46 AM    ALKPHOS 115 02/22/2014 05:01 PM    PROT 8 1 02/22/2014 05:01 PM    BILITOT 0 43 02/22/2014 05:01 PM    EGFR 89 01/30/2020 08:46 AM    EGFR 102 0 06/15/2016 02:27 AM     Endocrine Studies:   Lab Results   Component Value Date/Time    [id number] 2 780 01/30/2020 08:46 AM    WOJ4TCVJELQW 0 662 02/22/2014 05:33 PM    FREET4 1 0 02/22/2014 05:33 PM    TRIG 150 01/30/2020 08:46 AM    CHOLESTEROL 242 (H) 01/30/2020 08:46 AM    HDL 54 01/30/2020 08:46 AM    LDLCALC 158 (H) 01/30/2020 08:46 AM    [id number] 23 5 (L) 07/23/2020 11:30 AM       Current Medications     Current Outpatient Medications:     albuterol (Ventolin HFA) 90 mcg/act inhaler, Inhale 2 puffs every 6 (six) hours as needed for wheezing, Disp: 18 g, Rfl: 1    Health Maintenance     Health Maintenance   Topic Date Due    Depression Remission PHQ  02/07/2020    MAMMOGRAM  02/12/2020    COVID-19 Vaccine (2 - Moderna 2-dose series) 04/26/2021    BMI: Followup Plan  09/29/2021    Annual Physical  09/29/2021    Fall Risk  04/28/2022    BMI: Adult  04/28/2022    Colonoscopy Surveillance  05/11/2022    Pneumococcal Vaccine: 65+ Years (1 of 1 - PPSV23) 01/30/2025    Colorectal Cancer Screening  05/11/2027    DTaP,Tdap,and Td Vaccines (3 - Td) 12/06/2029    HIV Screening  Completed    Hepatitis C Screening  Completed    Influenza Vaccine  Completed    HIB Vaccine  Aged Out    Hepatitis B Vaccine  Aged Out    IPV Vaccine  Aged Out    Hepatitis A Vaccine  Aged Out    Meningococcal ACWY Vaccine  Aged Out    HPV Vaccine  Aged Out     Immunization History   Administered Date(s) Administered    INFLUENZA 11/23/2013, 05/13/2014, 03/04/2016, 03/16/2018, 01/04/2019, 12/06/2019    Influenza, injectable, quadrivalent, preservative free 0 5 mL 09/18/2020    Pneumococcal Polysaccharide PPV23 01/30/2020    SARS-CoV-2 / COVID-19 mRNA IM (Moderna) 03/29/2021    Tdap 04/01/2011, 12/06/2019    Tetanus Immune Globulin 04/01/2011       Zbigniew Fish DWYER    Internal Medicine PGY-2  4/28/2021 10:38 AM

## 2021-04-29 DIAGNOSIS — E11.9 DIABETES MELLITUS TYPE 2 IN NONOBESE (HCC): Primary | ICD-10-CM

## 2021-04-29 LAB
C TRACH DNA SPEC QL NAA+PROBE: NEGATIVE
N GONORRHOEA DNA SPEC QL NAA+PROBE: NEGATIVE

## 2021-04-29 RX ORDER — ATORVASTATIN CALCIUM 10 MG/1
10 TABLET, FILM COATED ORAL DAILY
Qty: 90 TABLET | Refills: 3 | Status: SHIPPED | OUTPATIENT
Start: 2021-04-29 | End: 2021-07-19

## 2021-04-29 RX ORDER — METFORMIN HYDROCHLORIDE 500 MG/1
500 TABLET, EXTENDED RELEASE ORAL
Qty: 30 TABLET | Refills: 2 | Status: SHIPPED | OUTPATIENT
Start: 2021-04-29 | End: 2021-09-07

## 2021-04-30 ENCOUNTER — IMMUNIZATIONS (OUTPATIENT)
Dept: FAMILY MEDICINE CLINIC | Facility: HOSPITAL | Age: 66
End: 2021-04-30

## 2021-04-30 DIAGNOSIS — Z23 ENCOUNTER FOR IMMUNIZATION: Primary | ICD-10-CM

## 2021-04-30 PROCEDURE — 0012A SARS-COV-2 / COVID-19 MRNA VACCINE (MODERNA) 100 MCG: CPT

## 2021-04-30 PROCEDURE — 91301 SARS-COV-2 / COVID-19 MRNA VACCINE (MODERNA) 100 MCG: CPT

## 2021-06-26 ENCOUNTER — HOSPITAL ENCOUNTER (EMERGENCY)
Facility: HOSPITAL | Age: 66
Discharge: HOME | End: 2021-06-26
Attending: EMERGENCY MEDICINE

## 2021-06-26 VITALS
SYSTOLIC BLOOD PRESSURE: 131 MMHG | RESPIRATION RATE: 18 BRPM | TEMPERATURE: 98.5 F | HEART RATE: 72 BPM | BODY MASS INDEX: 24.55 KG/M2 | OXYGEN SATURATION: 96 % | DIASTOLIC BLOOD PRESSURE: 82 MMHG | WEIGHT: 143 LBS

## 2021-06-26 DIAGNOSIS — R51.9 HEADACHE: Primary | ICD-10-CM

## 2021-06-26 DIAGNOSIS — M79.10 MUSCULAR ACHES: ICD-10-CM

## 2021-06-26 LAB
ANION GAP SERPL CALCULATED.3IONS-SCNC: 6 MMOL/L (ref 4–13)
BASOPHILS # BLD AUTO: 0.02 THOUSANDS/ΜL (ref 0–0.1)
BASOPHILS NFR BLD AUTO: 0 % (ref 0–1)
BUN SERPL-MCNC: 17 MG/DL (ref 5–25)
CALCIUM SERPL-MCNC: 8.9 MG/DL (ref 8.3–10.1)
CHLORIDE SERPL-SCNC: 108 MMOL/L (ref 100–108)
CO2 SERPL-SCNC: 23 MMOL/L (ref 21–32)
CREAT SERPL-MCNC: 0.85 MG/DL (ref 0.6–1.3)
EOSINOPHIL # BLD AUTO: 0.09 THOUSAND/ΜL (ref 0–0.61)
EOSINOPHIL NFR BLD AUTO: 1 % (ref 0–6)
ERYTHROCYTE [DISTWIDTH] IN BLOOD BY AUTOMATED COUNT: 13.2 % (ref 11.6–15.1)
GFR SERPL CREATININE-BSD FRML MDRD: 72 ML/MIN/1.73SQ M
GLUCOSE SERPL-MCNC: 193 MG/DL (ref 65–140)
HCT VFR BLD AUTO: 46.6 % (ref 34.8–46.1)
HGB BLD-MCNC: 14.8 G/DL (ref 11.5–15.4)
IMM GRANULOCYTES # BLD AUTO: 0.02 THOUSAND/UL (ref 0–0.2)
IMM GRANULOCYTES NFR BLD AUTO: 0 % (ref 0–2)
LYMPHOCYTES # BLD AUTO: 1.37 THOUSANDS/ΜL (ref 0.6–4.47)
LYMPHOCYTES NFR BLD AUTO: 13 % (ref 14–44)
MCH RBC QN AUTO: 28.6 PG (ref 26.8–34.3)
MCHC RBC AUTO-ENTMCNC: 31.8 G/DL (ref 31.4–37.4)
MCV RBC AUTO: 90 FL (ref 82–98)
MONOCYTES # BLD AUTO: 0.46 THOUSAND/ΜL (ref 0.17–1.22)
MONOCYTES NFR BLD AUTO: 4 % (ref 4–12)
NEUTROPHILS # BLD AUTO: 8.56 THOUSANDS/ΜL (ref 1.85–7.62)
NEUTS SEG NFR BLD AUTO: 82 % (ref 43–75)
NRBC BLD AUTO-RTO: 0 /100 WBCS
PLATELET # BLD AUTO: 293 THOUSANDS/UL (ref 149–390)
PMV BLD AUTO: 11.4 FL (ref 8.9–12.7)
POTASSIUM SERPL-SCNC: 4.2 MMOL/L (ref 3.5–5.3)
RBC # BLD AUTO: 5.18 MILLION/UL (ref 3.81–5.12)
SODIUM SERPL-SCNC: 137 MMOL/L (ref 136–145)
WBC # BLD AUTO: 10.52 THOUSAND/UL (ref 4.31–10.16)

## 2021-06-26 PROCEDURE — 96365 THER/PROPH/DIAG IV INF INIT: CPT

## 2021-06-26 PROCEDURE — 99284 EMERGENCY DEPT VISIT MOD MDM: CPT | Performed by: EMERGENCY MEDICINE

## 2021-06-26 PROCEDURE — 96361 HYDRATE IV INFUSION ADD-ON: CPT

## 2021-06-26 PROCEDURE — 99284 EMERGENCY DEPT VISIT MOD MDM: CPT

## 2021-06-26 PROCEDURE — 80048 BASIC METABOLIC PNL TOTAL CA: CPT | Performed by: EMERGENCY MEDICINE

## 2021-06-26 PROCEDURE — 96375 TX/PRO/DX INJ NEW DRUG ADDON: CPT

## 2021-06-26 PROCEDURE — 85025 COMPLETE CBC W/AUTO DIFF WBC: CPT | Performed by: EMERGENCY MEDICINE

## 2021-06-26 PROCEDURE — 36415 COLL VENOUS BLD VENIPUNCTURE: CPT | Performed by: EMERGENCY MEDICINE

## 2021-06-26 RX ORDER — MAGNESIUM SULFATE HEPTAHYDRATE 40 MG/ML
2 INJECTION, SOLUTION INTRAVENOUS ONCE
Status: COMPLETED | OUTPATIENT
Start: 2021-06-26 | End: 2021-06-26

## 2021-06-26 RX ORDER — NAPROXEN 500 MG/1
500 TABLET ORAL 2 TIMES DAILY WITH MEALS
Qty: 30 TABLET | Refills: 0 | Status: SHIPPED | OUTPATIENT
Start: 2021-06-26

## 2021-06-26 RX ORDER — METOCLOPRAMIDE HYDROCHLORIDE 5 MG/ML
10 INJECTION INTRAMUSCULAR; INTRAVENOUS ONCE
Status: COMPLETED | OUTPATIENT
Start: 2021-06-26 | End: 2021-06-26

## 2021-06-26 RX ORDER — KETOROLAC TROMETHAMINE 30 MG/ML
15 INJECTION, SOLUTION INTRAMUSCULAR; INTRAVENOUS ONCE
Status: COMPLETED | OUTPATIENT
Start: 2021-06-26 | End: 2021-06-26

## 2021-06-26 RX ORDER — ACETAMINOPHEN 325 MG/1
975 TABLET ORAL ONCE
Status: DISCONTINUED | OUTPATIENT
Start: 2021-06-26 | End: 2021-06-26 | Stop reason: HOSPADM

## 2021-06-26 RX ADMIN — SODIUM CHLORIDE 1000 ML: 0.9 INJECTION, SOLUTION INTRAVENOUS at 08:27

## 2021-06-26 RX ADMIN — METOCLOPRAMIDE HYDROCHLORIDE 10 MG: 5 INJECTION INTRAMUSCULAR; INTRAVENOUS at 08:30

## 2021-06-26 RX ADMIN — MAGNESIUM SULFATE HEPTAHYDRATE 2 G: 40 INJECTION, SOLUTION INTRAVENOUS at 08:33

## 2021-06-26 RX ADMIN — KETOROLAC TROMETHAMINE 15 MG: 30 INJECTION, SOLUTION INTRAMUSCULAR; INTRAVENOUS at 08:30

## 2021-06-26 NOTE — DISCHARGE INSTRUCTIONS
Recommend taking Tylenol 600 mg every 6 hours as needed for pain and/or ibuprofen 600 mg every 6 hours as needed for pain  Please follow-up with primary care provider  Please stay well hydrated  Please return for severely worsening pain, significant neck pain, fainting, weakness, or any other concerning signs or symptoms  Please refer to the following documents for additional instructions and return precautions

## 2021-06-26 NOTE — ED PROVIDER NOTES
History  Chief Complaint   Patient presents with    Headache     headache, started yesterday, mid back pain, fever at home     77-year-old female history of asthma and diabetes on oral medications presenting with headache  Patient reports that she was recently in a car for an extended period of time a couple days ago and has been having some paraspinal musculature and some leg aching  The following day, she continue with the muscle aches and developed a mild frontal headache  The headache continued to worsen gradually and she now rates it 10/10  Reports she has had headaches like this in the past that required admission  Feel similar to previous headaches  Denies any neck involvement including pain or rigidity  Not on anti-platelet or anti coagulant medications  Denies any neurological changes or vision changes  Reports that she has had both doses over COVID vaccination  Denies any other complaints at this time  Denies any vision changes, chest pain, shortness breath, abdominal pain, nausea/vomiting, fever/chills, or any bladder or bowel changes  Prior to Admission Medications   Prescriptions Last Dose Informant Patient Reported? Taking?    albuterol (Ventolin HFA) 90 mcg/act inhaler   No No   Sig: Inhale 2 puffs every 6 (six) hours as needed for wheezing   atorvastatin (LIPITOR) 10 mg tablet   No No   Sig: Take 1 tablet (10 mg total) by mouth daily   metFORMIN (GLUCOPHAGE-XR) 500 mg 24 hr tablet   No No   Sig: Take 1 tablet (500 mg total) by mouth daily with dinner      Facility-Administered Medications: None       Past Medical History:   Diagnosis Date    Asthma     Back pain     Bowel incontinence     Chronic pain     History of breast implant     Psychiatric disorder     panic attacks       Past Surgical History:   Procedure Laterality Date    AUGMENTATION MAMMAPLASTY      BREAST SURGERY      lift    SC COLONOSCOPY FLX DX W/COLLJ SPEC WHEN PFRMD N/A 5/11/2017    Procedure: COLONOSCOPY;  Surgeon: Godfrey Herrera MD;  Location: [facility]; Service: Gastroenterology       Family History   Problem Relation Age of Onset    Lung cancer Mother     Asthma Sister      I have reviewed and agree with the history as documented  E-Cigarette/Vaping    E-Cigarette Use Never User      E-Cigarette/Vaping Substances     Social History     Tobacco Use    Smoking status: Never Smoker    Smokeless tobacco: Never Used   Vaping Use    Vaping Use: Never used   Substance Use Topics    Alcohol use: No    Drug use: No        Review of Systems   Constitutional: Negative for appetite change, chills, diaphoresis, fever and unexpected weight change  HENT: Negative for congestion and rhinorrhea  Eyes: Negative for photophobia and visual disturbance  Respiratory: Negative for cough, chest tightness and shortness of breath  Cardiovascular: Negative for chest pain, palpitations and leg swelling  Gastrointestinal: Negative for abdominal distention, abdominal pain, blood in stool, constipation, diarrhea, nausea and vomiting  Genitourinary: Negative for dysuria and hematuria  Musculoskeletal: Positive for myalgias  Negative for back pain, joint swelling, neck pain and neck stiffness  Skin: Negative for color change, pallor, rash and wound  Neurological: Positive for headaches  Negative for dizziness, syncope, weakness and light-headedness  Psychiatric/Behavioral: Negative for agitation  All other systems reviewed and are negative        Physical Exam  ED Triage Vitals   Temperature Pulse Respirations Blood Pressure SpO2   06/26/21 0729 06/26/21 0729 06/26/21 0729 06/26/21 0730 06/26/21 0729   98 5 °F (36 9 °C) 89 18 (!) 171/103 96 %      Temp Source Heart Rate Source Patient Position - Orthostatic VS BP Location FiO2 (%)   06/26/21 0729 06/26/21 0730 06/26/21 0730 06/26/21 0730 --   Oral Monitor Sitting Right arm       Pain Score       06/26/21 0729       Worst Possible Pain Orthostatic Vital Signs  Vitals:    06/26/21 0815 06/26/21 0900 06/26/21 0930 06/26/21 1000   BP: (!) 162/117 143/81 131/80 131/82   Pulse: 82 82 78 72   Patient Position - Orthostatic VS: Sitting Sitting Sitting Sitting       Physical Exam  Vitals and nursing note reviewed  Constitutional:       General: She is not in acute distress  Appearance: Normal appearance  She is well-developed  She is not ill-appearing, toxic-appearing or diaphoretic  HENT:      Head: Normocephalic and atraumatic  Nose: Nose normal  No congestion or rhinorrhea  Mouth/Throat:      Mouth: Mucous membranes are moist       Pharynx: Oropharynx is clear  No oropharyngeal exudate or posterior oropharyngeal erythema  Eyes:      General: No scleral icterus  Right eye: No discharge  Left eye: No discharge  Extraocular Movements: Extraocular movements intact  Conjunctiva/sclera: Conjunctivae normal       Pupils: Pupils are equal, round, and reactive to light  Neck:      Vascular: No JVD  Trachea: No tracheal deviation  Comments: Neck supple without tenderness  Supple  Normal range of motion  No lymphadenopathy  Cardiovascular:      Rate and Rhythm: Normal rate and regular rhythm  Heart sounds: Normal heart sounds  No murmur heard  No friction rub  No gallop  Comments: Normal rate in the 70s and regular rhythm  Pulmonary:      Effort: Pulmonary effort is normal  No respiratory distress  Breath sounds: Normal breath sounds  No stridor  No wheezing or rales  Comments: Clear to auscultation bilaterally  Chest:      Chest wall: No tenderness  Abdominal:      General: Bowel sounds are normal  There is no distension  Palpations: Abdomen is soft  Tenderness: There is no abdominal tenderness  There is no right CVA tenderness, left CVA tenderness, guarding or rebound  Comments: Soft, nontender, nondistended    Normal bowel sounds throughout Musculoskeletal:         General: No swelling, tenderness, deformity or signs of injury  Normal range of motion  Cervical back: Normal range of motion and neck supple  No rigidity or tenderness  No muscular tenderness  Right lower leg: No edema  Left lower leg: No edema  Lymphadenopathy:      Cervical: No cervical adenopathy  Skin:     General: Skin is warm and dry  Coloration: Skin is not pale  Findings: No erythema or rash  Neurological:      General: No focal deficit present  Mental Status: She is alert and oriented to person, place, and time  Mental status is at baseline  Cranial Nerves: No cranial nerve deficit  Sensory: No sensory deficit  Motor: No weakness or abnormal muscle tone  Coordination: Coordination normal       Gait: Gait normal       Comments: A&Ox3 to person, place, and time  CN 2-12 intact  Strength 5/5 throughout  Sensation grossly intact  Cerebellar exam including gait intact  Psychiatric:         Behavior: Behavior normal          Thought Content:  Thought content normal          ED Medications  Medications   sodium chloride 0 9 % bolus 1,000 mL (0 mL Intravenous Stopped 6/26/21 1013)   ketorolac (TORADOL) injection 15 mg (15 mg Intravenous Given 6/26/21 0830)   metoclopramide (REGLAN) injection 10 mg (10 mg Intravenous Given 6/26/21 0830)   magnesium sulfate 2 g/50 mL IVPB (premix) 2 g (0 g Intravenous Stopped 6/26/21 0903)       Diagnostic Studies  Results Reviewed     Procedure Component Value Units Date/Time    Basic metabolic panel [[id number]]  (Abnormal) Collected: 06/26/21 0837    Lab Status: Final result Specimen: Blood from Arm, Left Updated: 06/26/21 0904     Sodium 137 mmol/L      Potassium 4 2 mmol/L      Chloride 108 mmol/L      CO2 23 mmol/L      ANION GAP 6 mmol/L      BUN 17 mg/dL      Creatinine 0 85 mg/dL      Glucose 193 mg/dL      Calcium 8 9 mg/dL      eGFR 72 ml/min/1 73sq m     Narrative:      National Kidney Disease Foundation guidelines for Chronic Kidney Disease (CKD):     Stage 1 with normal or high GFR (GFR > 90 mL/min/1 73 square meters)    Stage 2 Mild CKD (GFR = 60-89 mL/min/1 73 square meters)    Stage 3A Moderate CKD (GFR = 45-59 mL/min/1 73 square meters)    Stage 3B Moderate CKD (GFR = 30-44 mL/min/1 73 square meters)    Stage 4 Severe CKD (GFR = 15-29 mL/min/1 73 square meters)    Stage 5 End Stage CKD (GFR <15 mL/min/1 73 square meters)  Note: GFR calculation is accurate only with a steady state creatinine    CBC and differential [[id number]]  (Abnormal) Collected: 06/26/21 0837    Lab Status: Final result Specimen: Blood from Arm, Left Updated: 06/26/21 0844     WBC 10 52 Thousand/uL      RBC 5 18 Million/uL      Hemoglobin 14 8 g/dL      Hematocrit 46 6 %      MCV 90 fL      MCH 28 6 pg      MCHC 31 8 g/dL      RDW 13 2 %      MPV 11 4 fL      Platelets 090 Thousands/uL      nRBC 0 /100 WBCs      Neutrophils Relative 82 %      Immat GRANS % 0 %      Lymphocytes Relative 13 %      Monocytes Relative 4 %      Eosinophils Relative 1 %      Basophils Relative 0 %      Neutrophils Absolute 8 56 Thousands/µL      Immature Grans Absolute 0 02 Thousand/uL      Lymphocytes Absolute 1 37 Thousands/µL      Monocytes Absolute 0 46 Thousand/µL      Eosinophils Absolute 0 09 Thousand/µL      Basophils Absolute 0 02 Thousands/µL                  No orders to display         Procedures  Procedures      ED Course               Identification of Seniors at Risk      Most Recent Value   (ISAR) Identification of Seniors at Risk   Before the illness or injury that brought you to the Emergency, did you need someone to help you on a regular basis? 0 Filed at: 06/26/2021 0731   In the last 24 hours, have you needed more help than usual?  0 Filed at: 06/26/2021 0343   Have you been hospitalized for one or more nights during the past 6 months?   0 Filed at: 06/26/2021 0731   In general, do you see well?  0 Filed at: 06/26/2021 0731   In general, do you have serious problems with your memory? 0 Filed at: 06/26/2021 7041   Do you take more than three different medications every day? 1 Filed at: 06/26/2021 0731   ISAR Score  1 Filed at: 06/26/2021 0731                    SBIRT 22yo+      Most Recent Value   SBIRT (25 yo +)   In order to provide better care to our patients, we are screening all of our patients for alcohol and drug use  Would it be okay to ask you these screening questions? Yes Filed at: 06/26/2021 8091   Initial Alcohol Screen: US AUDIT-C    1  How often do you have a drink containing alcohol?  0 Filed at: 06/26/2021 0739   2  How many drinks containing alcohol do you have on a typical day you are drinking? 0 Filed at: 06/26/2021 0739   3a  Male UNDER 65: How often do you have five or more drinks on one occasion? 0 Filed at: 06/26/2021 0739   3b  FEMALE Any Age, or MALE 65+: How often do you have 4 or more drinks on one occassion? 0 Filed at: 06/26/2021 0739   Audit-C Score  0 Filed at: 06/26/2021 8796   DORY: How many times in the past year have you    Used an illegal drug or used a prescription medication for non-medical reasons? Never Filed at: 06/26/2021 9840                MDM  Number of Diagnoses or Management Options  Headache  Muscular aches  Diagnosis management comments: 49-year-old female history of asthma and diabetes on oral medications presenting with headache  No red flag symptoms or nuchal rigidity or tenderness  Normal neurological exam   Intracranial process or bleed very unlikely  Plan for symptomatic management with IV Toradol and magnesium and oral medications plus IV fluids  Basic labs  Reassess  Symptoms almost completely resolved after medications  Labs no acute process  Given instructions return precautions  Medication recommendations  Advised follow-up primary care provider  Given instructions return precautions  All questions answered    Patient acknowledged understanding of all written and verbal instructions return precautions  Discharge  Amount and/or Complexity of Data Reviewed  Clinical lab tests: reviewed and ordered  Tests in the radiology section of CPT®: reviewed  Tests in the medicine section of CPT®: reviewed and ordered  Review and summarize past medical records: yes  Independent visualization of images, tracings, or specimens: yes    Risk of Complications, Morbidity, and/or Mortality  Presenting problems: moderate  Diagnostic procedures: moderate  Management options: moderate    Patient Progress  Patient progress: improved      Disposition  Final diagnoses:   Headache   Muscular aches     Time reflects when diagnosis was documented in both MDM as applicable and the Disposition within this note     Time User Action Codes Description Comment    6/26/2021 10:01 AM Elmira Caro [R51 9] Headache     6/26/2021 10:02 AM Elmira Caro [M79 10] Muscular aches       ED Disposition     ED Disposition Condition Date/Time Comment    Discharge Stable Sat Jun 26, 2021 10:03 AM Gertrudis Damon discharge to home/self care  Follow-up Information     Follow up With Specialties Details [address]  Schedule an appointment as soon as possible for a visit  [phone]   [facility] [id number]          Discharge Medication List as of 6/26/2021 10:03 AM      START taking these medications    Details   naproxen (NAPROSYN) 500 mg tablet Take 1 tablet (500 mg total) by mouth 2 (two) times a day with meals, Starting Sat 6/26/2021, Normal         CONTINUE these medications which have NOT CHANGED    Details   albuterol (Ventolin HFA) 90 mcg/act inhaler Inhale 2 puffs every 6 (six) hours as needed for wheezing, Starting Fri 11/13/2020, Normal      atorvastatin (LIPITOR) 10 mg tablet Take 1 tablet (10 mg total) by mouth daily, Starting Thu 4/29/2021, Normal      metFORMIN (GLUCOPHAGE-XR) 500 mg 24 hr tablet Take 1 tablet (500 mg total) by mouth daily with dinner, Starting Thu 4/29/2021, Normal           No discharge procedures on file  PDMP Review     None           ED Provider  Attending physically available and evaluated Eladia Davila I managed the patient along with the ED Attending      Electronically Signed by         Tello Pagan MD  06/26/21 9425

## 2021-07-19 ENCOUNTER — OFFICE VISIT (OUTPATIENT)
Dept: INTERNAL MEDICINE CLINIC | Facility: CLINIC | Age: 66
End: 2021-07-19

## 2021-07-19 VITALS
DIASTOLIC BLOOD PRESSURE: 9 MMHG | BODY MASS INDEX: 25.64 KG/M2 | HEART RATE: 70 BPM | WEIGHT: 150.2 LBS | SYSTOLIC BLOOD PRESSURE: 152 MMHG | TEMPERATURE: 98.8 F | HEIGHT: 64 IN

## 2021-07-19 DIAGNOSIS — E78.5 HYPERLIPIDEMIA, UNSPECIFIED HYPERLIPIDEMIA TYPE: Chronic | ICD-10-CM

## 2021-07-19 DIAGNOSIS — R21 RASH: ICD-10-CM

## 2021-07-19 DIAGNOSIS — E11.9 DIABETES MELLITUS TYPE 2 IN NONOBESE (HCC): Primary | ICD-10-CM

## 2021-07-19 DIAGNOSIS — R51.9 HEADACHE: ICD-10-CM

## 2021-07-19 PROCEDURE — 99213 OFFICE O/P EST LOW 20 MIN: CPT | Performed by: INTERNAL MEDICINE

## 2021-07-19 RX ORDER — ATORVASTATIN CALCIUM 40 MG/1
40 TABLET, FILM COATED ORAL DAILY
Qty: 30 TABLET | Refills: 0 | Status: SHIPPED | OUTPATIENT
Start: 2021-07-19 | End: 2021-11-05 | Stop reason: SDUPTHER

## 2021-07-19 NOTE — PATIENT INSTRUCTIONS
Use hydrocortisone as needed for lower back rash  Continue metformin and Lipitor  Repeat blood work and urine testing at the end of the month  Continue take Naprosyn as needed for tension headaches  Follow-up in 3 months  Diabetes y ejercicio   LO QUE NECESITA SABER:   La actividad física, butch el ejercicio, puede ayudarlo a mantener estable gonzales nivel de azúcar en la kalyn o mejorar la resistencia a la insulina  La actividad puede ayudar a disminuir el riesgo de enfermedad cardíaca y Louellen Manna a perder peso  El ejercicio puede ayudar a bajar gonzales A1c  Gonzales equipo de cuidado de la diabetes lo ayudará a crear un plan de ejercicio  El plan se basará en el tipo de diabetes que tenga y en gonzales estado físico inicial   INSTRUCCIONES SOBRE EL [facility]PITAL[facility]:   Llame al [place] de emergencias local (911 en los [place]) si:  · Usted tiene dolor en el pecho o falta de aire  Regrese a la nora de emergencias si:  · Usted tiene un nivel bajo azúcar en la kalyn y el problema no mejora con el tratamiento  Los síntomas son dificultad para pensar, latidos cardíacos adelso y sudoración  · Gonzales nivel de azúcar en la kalyn está por encima de 240 mg/dl y no baja dentro de los 15 minutos del tratamiento  · Usted tiene visión borrosa o doble  · Gonzales aliento tiene un [employer] a frutas o gonzales respiración es superficial     Llame a gonzales médico o al equipo de atención diabética si:  · Usted tiene cetonas en la kalyn o en la orina  · Tiene fiebre  · Ale niveles de azúcar en la kalyn son superiores a las metas fijadas  · Usted tiene bajos niveles de azúcar en gonzales kalyn frecuentemente  · Gonzales piel está enrojecida, seca, caliente al tacto o inflamada  · Usted tiene tess herida que no cicatriza  · Usted tiene problemas para sobrellevar gonzales diabetes o se siente ansioso o deprimido  · Usted tiene preguntas o inquietudes acerca de gonzales condición o cuidado      Consejos para ayudarlo a crear y cumplir con ale metas de ejercicio:  · Establezca tess meta para la [place] de moderada a intensa, que sea carol ann 150 minutos (2 5 horas) por lo menos 5 veces a la semana  La actividad Manoj ayuda a que gonzales corazón se mantenga peg  La actividad aeróbica incluye caminar, andar en bicicleta, bailar, nadar y rastrillar las hojas  Distribuya la [place] en 3 a 5 días  No deje pasar más de 2 días seguidos sin [place]  Lo mejor es hacer al menos 10 minutos cada vez y 27 minutos cada día  Puede progresar hasta llegar a estas metas  Recuerde que cualquier actividad es mejor que [place]  Con el tiempo, puede hacer que el ejercicio sea más intenso o que dure [place]  También puede añadir más días de ejercicio a medida que gonzales nivel de condición física mejore  Gonzales equipo de atención de la diabetes puede ayudarlo a hacer un plan paso a paso para lograr ale metas  · Establezca tess meta para el entrenamiento de fuerza que sea de 2 a 3 veces por semana  El entrenamiento de fuerza le ayuda a mantener los músculos que ya tiene y a desarrollar nuevos  El entrenamiento de fuerza incluye levantamiento de pesas, subir escaleras, hacer yoga y main chi     · Los adultos mayores deben incluir entrenamiento del equilibrio de 2 a 3 veces a la semana  Se trata de caminar hacia atrás, pararse sobre un pie y caminar en línea recta haciendo punta talón  Otros consejos de ejercicio saludable:  · Johan estiramiento antes y después de hacer ejercicio para evitar lesiones  · [place] agua o líquidos que no contengan azúcar antes, carol ann y [address] [facility] la [facility]  Pregúntele a gonzales dietista o médico cuáles líquidos debería ely cuando se ejercite  · No permanezca sentado por más de 30 minutos cada vez carol ann el día  Si usted no puede caminar, al [employer]  [place] le ayudará a permanecer activo y mantener la circulación sanguínea      Ejercicio y nivel de azúcar en la kalyn: Revise gonzales nivel de azúcar en la kalyn antes y después de hacer tess actividad física si [place] insulina  Puede que los [employer] sugieran que cambie la cantidad de insulina que usted fredy o los alimentos que consume  · Si gonzales nivel de azúcar en la kalyn es alto, revise gonzales kalyn u [facility] para hansel si hay cetonas y hágalo antes de ejercitarse  No fred ejercicio si gonzales nivel de azúcar en la kalyn es alto y usted tiene cetonas  · Si gonzales nivel de azúcar en la kalyn está por debajo de los 100 mg/dl, coma tess merienda con carbohidratos antes de hacer ejercicio  Por ejemplo 4 a 6 galletas de soda, 1/2 banano, 1 taza (8 onzas) de [place], medio vaso (4 onzas) de jugo  Acuda a ale consultas de control con gonzales médico o con el equipo de cuidado de la diabetes según le indicaron: Anote ale preguntas para que se acuerde de hacerlas carol ann ale visitas  © Copyright [address] Information is for End User's use only and may not be sold, redistributed or otherwise used for commercial purposes  All illustrations and images included in CareNotes® are the copyrighted property of [employer]  or [address] [place] [address] es sólo para uso en educación  Gonzales intención no es darle un consejo médico sobre enfermedades o tratamientos  Colsulte con gonzales [place] farmacéutico antes de seguir cualquier régimen médico para saber si es seguro y efectivo para usted

## 2021-07-19 NOTE — PROGRESS NOTES
INTERNAL MEDICINE FOLLOW-UP OFFICE VISIT  [facility]  [address] [place] [address]    NAME: Yaniv Dooley  AGE: 72 y o  SEX: female    DATE OF ENCOUNTER: 7/19/2021    Assessment and Plan     1  Diabetes mellitus type 2 in nonobese (HCC)    A1c of 7 0 checked 2 months ago  Patient has made the necessary changes to diet and lifestyle  She is compliant with her medications with minimal side effects  Foot exam performed during this visit is normal  ·  diabetic eye exam referral ordered  ·  urine microalbumin to creatinine ratio ordered  ·  continue metformin 500 mg daily  ·  recheck A1c at the end of this month  ·   Follow-up in 3 months for further management  - Hemoglobin A1C; Future  - Ambulatory referral to Ophthalmology; Future  - Microalbumin / creatinine urine ratio    2  Hyperlipidemia, unspecified hyperlipidemia type  Lipid panel reviewed   ·  increase Lipitor dose to 40 mg daily due to elevated ASCVD    3  Rash  Reports small periodic rash on her lower back over the past day, likely contact dermatitis  ·  continue hydrocortisone  - hydrocortisone 2 5 % cream; Apply topically 2 (two) times a day as needed for irritation or rash  Dispense: 30 g; Refill: 0    4  Headache   patient reports generalized tension headache which happens sporadically,  Usually once a year per patient  Responds well to and NSAIDs    ·   Continue to use Naprosyn or Tylenol as needed for headaches  · Follow-up at next visit with headache diary      Orders Placed This Encounter   Procedures    Hemoglobin A1C    Microalbumin / creatinine urine ratio    Ambulatory referral to Ophthalmology       Chief Complaint     Chief Complaint   Patient presents with    Diabetes     doing well     Headache     2-3 times a year       History of Present Illness      70-year-old female with past medical history of diabetes mellitus type 2, asthma, depression, anxiety, hyperlipidemia who is presenting to office for follow-up on diabetes  She reports only complaint of minor pruritic rash on her lower back for breast 1 day  No bleeding or discharge from the area  Has not tried anything for rash  Denies any pain in the area  She also reports generalized headache that occurs once a year per patient  Responded well to Naprosyn  She currently works as a   She reports being compliant with diabetic diet  His increasing her exercise and reports weight loss  Compliant with medications with no side effects  The following portions of the patient's history were reviewed and updated as appropriate: allergies, current medications, past family history, past medical history, past social history, past surgical history and problem list     Review of Systems       ROS  CONSTITUTIONAL: Denies any fever, chills, rigors, and weight loss  HEENT: No earache or tinnitus  Denies hearing loss  CARDIOVASCULAR: No chest pain or palpitations  RESPIRATORY: Denies any cough, hemoptysis, shortness of breath or dyspnea on exertion  GASTROINTESTINAL: Denies abdominal pain, nausea, vomitng   NEUROLOGIC:   Positive for sporadic headaches  No dizziness or vertigo   MUSCULOSKELETAL: Denies any muscle or joint pain  SKIN:   Positive for rash  All other systems reviewed and were negative  Medical History     Past Medical History:   Diagnosis Date    Asthma     Back pain     Bowel incontinence     Chronic pain     History of breast implant     Psychiatric disorder     panic attacks     Past Surgical History:   Procedure Laterality Date    AUGMENTATION MAMMAPLASTY      BREAST SURGERY      lift    NM COLONOSCOPY FLX DX W/COLLJ SPEC WHEN PFRMD N/A 5/11/2017    Procedure: COLONOSCOPY;  Surgeon: Carly Vidal MD;  Location: [facility];   Service: Gastroenterology       Current Outpatient Medications:     albuterol (Ventolin HFA) 90 mcg/act inhaler, Inhale 2 puffs every 6 (six) hours as needed for wheezing, Disp: 18 g, Rfl: 1    atorvastatin (LIPITOR) 10 mg tablet, Take 1 tablet (10 mg total) by mouth daily, Disp: 90 tablet, Rfl: 3    metFORMIN (GLUCOPHAGE-XR) 500 mg 24 hr tablet, Take 1 tablet (500 mg total) by mouth daily with dinner, Disp: 30 tablet, Rfl: 2    naproxen (NAPROSYN) 500 mg tablet, Take 1 tablet (500 mg total) by mouth 2 (two) times a day with meals, Disp: 30 tablet, Rfl: 0    hydrocortisone 2 5 % cream, Apply topically 2 (two) times a day as needed for irritation or rash, Disp: 30 g, Rfl: 0   Family History   Problem Relation Age of Onset    Lung cancer Mother     Asthma Sister       Social History     Socioeconomic History    Marital status: Single     Spouse name: None    Number of children: None    Years of education: None    Highest education level: None   Occupational History    None   Tobacco Use    Smoking status: Never Smoker    Smokeless tobacco: Never Used   Vaping Use    Vaping Use: Never used   Substance and Sexual Activity    Alcohol use: No    Drug use: No    Sexual activity: Not Currently   Other Topics Concern    None   Social History Narrative    Financial status inadequate or marginal      Social Determinants of Health     Financial Resource Strain: Medium Risk    Difficulty of Paying Living Expenses: Somewhat hard   Food Insecurity: No Food Insecurity    Worried About Running Out of Food in the Last Year: Never true    Nehemiah of Food in the Last Year: Never true   Transportation Needs: No Transportation Needs    Lack of Transportation (Medical): No    Lack of Transportation (Non-Medical): No   Physical Activity: Sufficiently Active    Days of Exercise per Week: 5 days    Minutes of Exercise per Session: 60 min   Stress: No Stress Concern Present    Feeling of Stress :  Only a little   Social Connections:     Frequency of Communication with Friends and Family:     Frequency of Social Gatherings with Friends and Family:     Attends Protestant Services:     Active Member of Clubs or Organizations:     Attends Club or Organization Meetings:     Marital Status:    Intimate Partner Violence: Not At Risk    Fear of Current or Ex-Partner: No    Emotionally Abused: No    Physically Abused: No    Sexually Abused: No      No Known Allergies     Active Problem List     Patient Active Problem List   Diagnosis    Asthma    Depression    Lumbar radiculopathy    Panic disorder    Seasonal allergies    Diverticulosis of large intestine    Other insomnia    Hyperlipidemia    Need for pneumococcal vaccination    Other fatigue    Constipation    Screening mammogram, encounter for    Diabetes mellitus type 2 in nonobese ([facility])       Objective     BP (!) 152/9 (BP Location: Left arm, Patient Position: Sitting)   Pulse 70   Temp 98 8 °F (37 1 °C)   Ht 5' 4" (1 626 m)   Wt 68 1 kg (150 lb 3 2 oz)   BMI 25 78 kg/m²     Physical Exam  Constitutional:       General: She is not in acute distress  Appearance: Normal appearance  She is not ill-appearing  HENT:      Head: Normocephalic and atraumatic  Mouth/Throat:      Mouth: Mucous membranes are moist    Eyes:      Extraocular Movements: Extraocular movements intact  Conjunctiva/sclera: Conjunctivae normal       Pupils: Pupils are equal, round, and reactive to light  Cardiovascular:      Rate and Rhythm: Normal rate and regular rhythm  Pulses: Normal pulses  no weak pulses          Dorsalis pedis pulses are 2+ on the right side and 2+ on the left side  Posterior tibial pulses are 2+ on the right side and 2+ on the left side  Heart sounds: Normal heart sounds  No murmur heard  No friction rub  No gallop  Pulmonary:      Effort: Pulmonary effort is normal  No respiratory distress  Breath sounds: Normal breath sounds  No wheezing or rales  Abdominal:      General: Abdomen is flat  Bowel sounds are normal  There is no distension  Palpations: Abdomen is soft  Tenderness:  There is no abdominal tenderness  There is no guarding  Musculoskeletal:      Right lower leg: No edema  Left lower leg: No edema  Feet:      Right foot:      Skin integrity: No ulcer, skin breakdown, erythema, warmth, callus or dry skin  Left foot:      Skin integrity: No ulcer, skin breakdown, erythema, warmth, callus or dry skin  Skin:     General: Skin is warm and dry  Findings: Rash present  Comments:  Very small erythematous rash on lower back with excoriations  Neurological:      General: No focal deficit present  Mental Status: She is alert and oriented to person, place, and time  Psychiatric:         Mood and Affect: Mood normal          Behavior: Behavior normal         Patient's shoes and socks removed  Right Foot/Ankle   Right Foot Inspection  Skin Exam: skin normal and skin intact no dry skin, no warmth, no callus, no erythema, no maceration, no abnormal color, no pre-ulcer, no ulcer and no callus                          Toe Exam: ROM and strength within normal limits  Sensory   Vibration: intact  Proprioception: intact   Monofilament testing: intact  Vascular  Capillary refills: < 3 seconds  The right DP pulse is 2+  The right PT pulse is 2+  Left Foot/Ankle  Left Foot Inspection  Skin Exam: skin normal and skin intactno dry skin, no warmth, no erythema, no maceration, normal color, no pre-ulcer, no ulcer and no callus                         Toe Exam: ROM and strength within normal limits                   Sensory   Vibration: intact  Proprioception: intact  Monofilament: intact  Vascular  Capillary refills: < 3 seconds  The left DP pulse is 2+  The left PT pulse is 2+  Assign Risk Category:  No deformity present; No loss of protective sensation;  No weak pulses       Risk: 0      Pertinent Laboratory/Diagnostic Studies:  CBC:   Lab Results   Component Value Date/Time    WBC 10 52 (H) 06/26/2021 08:37 AM    WBC 7 60 02/22/2014 05:01 PM    RBC 5 18 (H) 06/26/2021 08:37 AM    RBC 4 79 02/22/2014 05:01 PM    HGB 14 8 06/26/2021 08:37 AM    HGB 14 2 02/22/2014 05:01 PM    HCT 46 6 (H) 06/26/2021 08:37 AM    HCT 41 9 02/22/2014 05:01 PM    MCV 90 06/26/2021 08:37 AM    MCV 88 02/22/2014 05:01 PM    MCH 28 6 06/26/2021 08:37 AM    MCH 29 6 02/22/2014 05:01 PM    MCHC 31 8 06/26/2021 08:37 AM    MCHC 33 9 02/22/2014 05:01 PM    RDW 13 2 06/26/2021 08:37 AM    RDW 13 4 02/22/2014 05:01 PM    MPV 11 4 06/26/2021 08:37 AM    MPV 10 5 02/22/2014 05:01 PM     06/26/2021 08:37 AM     02/22/2014 05:01 PM    NRBC 0 06/26/2021 08:37 AM    NEUTOPHILPCT 82 (H) 06/26/2021 08:37 AM    NEUTOPHILPCT 70 02/22/2014 05:01 PM    LYMPHOPCT 13 (L) 06/26/2021 08:37 AM    LYMPHOPCT 24 02/22/2014 05:01 PM    MONOPCT 4 06/26/2021 08:37 AM    MONOPCT 5 02/22/2014 05:01 PM    EOSPCT 1 06/26/2021 08:37 AM    EOSPCT 1 02/22/2014 05:01 PM    BASOPCT 0 06/26/2021 08:37 AM    BASOPCT 0 02/22/2014 05:01 PM    NEUTROABS 8 56 (H) 06/26/2021 08:37 AM    NEUTROABS 5 32 02/22/2014 05:01 PM    LYMPHSABS 1 37 06/26/2021 08:37 AM    LYMPHSABS 1 82 02/22/2014 05:01 PM    MONOSABS 0 46 06/26/2021 08:37 AM    MONOSABS 0 38 02/22/2014 05:01 PM    EOSABS 0 09 06/26/2021 08:37 AM    EOSABS 0 08 02/22/2014 05:01 PM     Chemistry Profile:   Lab Results   Component Value Date/Time     02/22/2014 05:01 PM    K 4 2 06/26/2021 08:37 AM    K 4 2 02/22/2014 05:01 PM     06/26/2021 08:37 AM     02/22/2014 05:01 PM    CO2 23 06/26/2021 08:37 AM    CO2 23 06/15/2016 02:27 AM    ANIONGAP 4 02/22/2014 05:01 PM    BUN 17 06/26/2021 08:37 AM    BUN 20 02/22/2014 05:01 PM    CREATININE 0 85 06/26/2021 08:37 AM    CREATININE 0 87 02/22/2014 05:01 PM    GLUC 193 (H) 06/26/2021 08:37 AM    GLUF 162 (H) 04/28/2021 11:33 AM    GLUCOSE 112 06/15/2016 02:27 AM    GLUCOSE 103 02/22/2014 05:01 PM    CALCIUM 8 9 06/26/2021 08:37 AM    CALCIUM 9 6 02/22/2014 05:01 PM    MG 2 4 02/22/2014 05:01 PM    AST 9 04/28/2021 11:33 AM AST 14 02/22/2014 05:01 PM    ALT 28 04/28/2021 11:33 AM    ALT 24 02/22/2014 05:01 PM    ALKPHOS 106 04/28/2021 11:33 AM    ALKPHOS 115 02/22/2014 05:01 PM    PROT 8 1 02/22/2014 05:01 PM    BILITOT 0 43 02/22/2014 05:01 PM    EGFR 72 06/26/2021 08:37 AM    EGFR 102 0 06/15/2016 02:27 AM     Endocrine Studies:   Lab Results   Component Value Date/Time    HGBA1C 7 0 (H) 04/28/2021 11:33 AM    CAP0VDKTXQBD 2 780 01/30/2020 08:46 AM    PCJ2TSVGOMDN 0 662 02/22/2014 05:33 PM    FREET4 1 0 02/22/2014 05:33 PM    TRIG 166 (H) 04/28/2021 11:33 AM    CHOLESTEROL 271 (H) 04/28/2021 11:33 AM    HDL 55 04/28/2021 11:33 AM    LDLCALC 183 (H) 04/28/2021 11:33 AM    [id number] 30 0 04/28/2021 11:33 AM       Current Medications     Current Outpatient Medications:     albuterol (Ventolin HFA) 90 mcg/act inhaler, Inhale 2 puffs every 6 (six) hours as needed for wheezing, Disp: 18 g, Rfl: 1    atorvastatin (LIPITOR) 10 mg tablet, Take 1 tablet (10 mg total) by mouth daily, Disp: 90 tablet, Rfl: 3    metFORMIN (GLUCOPHAGE-XR) 500 mg 24 hr tablet, Take 1 tablet (500 mg total) by mouth daily with dinner, Disp: 30 tablet, Rfl: 2    naproxen (NAPROSYN) 500 mg tablet, Take 1 tablet (500 mg total) by mouth 2 (two) times a day with meals, Disp: 30 tablet, Rfl: 0    hydrocortisone 2 5 % cream, Apply topically 2 (two) times a day as needed for irritation or rash, Disp: 30 g, Rfl: 0    Health Maintenance     Health Maintenance   Topic Date Due    Diabetic Foot Exam  Never done    DM Eye Exam  Never done    URINE MICROALBUMIN  Never done    Breast Cancer Screening: Mammogram  02/12/2020    Influenza Vaccine (1) 09/01/2021    Annual Physical  09/29/2021    HEMOGLOBIN A1C  10/28/2021    Fall Risk  04/28/2022    BMI: Followup Plan  04/28/2022    Depression Remission PHQ  04/28/2022    Colorectal Cancer Screening  05/11/2022    BMI: Adult  07/19/2022    Pneumococcal Vaccine: 65+ Years (2 of 2 - PPSV23) 01/30/2025    DTaP,Tdap,and Td Vaccines (3 - Td or Tdap) 12/06/2029    HIV Screening  Completed    Hepatitis C Screening  Completed    COVID-19 Vaccine  Completed    HIB Vaccine  Aged Out    Hepatitis B Vaccine  Aged Out    IPV Vaccine  Aged Out    Hepatitis A Vaccine  Aged Out    Meningococcal ACWY Vaccine  Aged Out    HPV Vaccine  Aged Out     Immunization History   Administered Date(s) Administered    INFLUENZA 11/23/2013, 05/13/2014, 03/04/2016, 03/16/2018, 01/04/2019, 12/06/2019    Influenza, injectable, quadrivalent, preservative free 0 5 mL 09/18/2020    Pneumococcal Polysaccharide PPV23 01/30/2020    SARS-CoV-2 / COVID-19 mRNA IM (Moderna) 03/29/2021, 04/30/2021    Tdap 04/01/2011, 12/06/2019    Tetanus Immune Globulin 04/01/2011       Zbigniew DWYER    Internal Medicine PGY-3  7/19/2021 12:28 PM

## 2021-08-08 ENCOUNTER — APPOINTMENT (EMERGENCY)
Dept: RADIOLOGY | Facility: HOSPITAL | Age: 66
End: 2021-08-08

## 2021-08-08 ENCOUNTER — HOSPITAL ENCOUNTER (EMERGENCY)
Facility: HOSPITAL | Age: 66
Discharge: HOME | End: 2021-08-08
Attending: EMERGENCY MEDICINE

## 2021-08-08 VITALS
HEART RATE: 100 BPM | OXYGEN SATURATION: 98 % | DIASTOLIC BLOOD PRESSURE: 72 MMHG | TEMPERATURE: 97 F | SYSTOLIC BLOOD PRESSURE: 152 MMHG | RESPIRATION RATE: 18 BRPM

## 2021-08-08 DIAGNOSIS — N39.0 UTI (URINARY TRACT INFECTION): ICD-10-CM

## 2021-08-08 DIAGNOSIS — B34.9 VIRAL ILLNESS: Primary | ICD-10-CM

## 2021-08-08 LAB
ANION GAP SERPL CALCULATED.3IONS-SCNC: 4 MMOL/L (ref 4–13)
BACTERIA UR QL AUTO: ABNORMAL /HPF
BASOPHILS # BLD AUTO: 0.02 THOUSANDS/ΜL (ref 0–0.1)
BASOPHILS NFR BLD AUTO: 0 % (ref 0–1)
BILIRUB UR QL STRIP: NEGATIVE
BUN SERPL-MCNC: 11 MG/DL (ref 5–25)
CALCIUM SERPL-MCNC: 9.3 MG/DL (ref 8.3–10.1)
CHLORIDE SERPL-SCNC: 107 MMOL/L (ref 100–108)
CLARITY UR: CLEAR
CO2 SERPL-SCNC: 27 MMOL/L (ref 21–32)
COLOR UR: YELLOW
CREAT SERPL-MCNC: 0.89 MG/DL (ref 0.6–1.3)
EOSINOPHIL # BLD AUTO: 0.07 THOUSAND/ΜL (ref 0–0.61)
EOSINOPHIL NFR BLD AUTO: 1 % (ref 0–6)
ERYTHROCYTE [DISTWIDTH] IN BLOOD BY AUTOMATED COUNT: 13.2 % (ref 11.6–15.1)
GFR SERPL CREATININE-BSD FRML MDRD: 68 ML/MIN/1.73SQ M
GLUCOSE SERPL-MCNC: 111 MG/DL (ref 65–140)
GLUCOSE UR STRIP-MCNC: NEGATIVE MG/DL
HCT VFR BLD AUTO: 43.4 % (ref 34.8–46.1)
HGB BLD-MCNC: 13.8 G/DL (ref 11.5–15.4)
HGB UR QL STRIP.AUTO: NEGATIVE
HYALINE CASTS #/AREA URNS LPF: ABNORMAL /LPF
IMM GRANULOCYTES # BLD AUTO: 0.04 THOUSAND/UL (ref 0–0.2)
IMM GRANULOCYTES NFR BLD AUTO: 0 % (ref 0–2)
KETONES UR STRIP-MCNC: NEGATIVE MG/DL
LEUKOCYTE ESTERASE UR QL STRIP: ABNORMAL
LYMPHOCYTES # BLD AUTO: 1.87 THOUSANDS/ΜL (ref 0.6–4.47)
LYMPHOCYTES NFR BLD AUTO: 20 % (ref 14–44)
MCH RBC QN AUTO: 28.3 PG (ref 26.8–34.3)
MCHC RBC AUTO-ENTMCNC: 31.8 G/DL (ref 31.4–37.4)
MCV RBC AUTO: 89 FL (ref 82–98)
MONOCYTES # BLD AUTO: 0.93 THOUSAND/ΜL (ref 0.17–1.22)
MONOCYTES NFR BLD AUTO: 10 % (ref 4–12)
NEUTROPHILS # BLD AUTO: 6.22 THOUSANDS/ΜL (ref 1.85–7.62)
NEUTS SEG NFR BLD AUTO: 69 % (ref 43–75)
NITRITE UR QL STRIP: NEGATIVE
NON-SQ EPI CELLS URNS QL MICRO: ABNORMAL /HPF
NRBC BLD AUTO-RTO: 0 /100 WBCS
PH UR STRIP.AUTO: 7 [PH] (ref 4.5–8)
PLATELET # BLD AUTO: 302 THOUSANDS/UL (ref 149–390)
PMV BLD AUTO: 11.6 FL (ref 8.9–12.7)
POTASSIUM SERPL-SCNC: 4.1 MMOL/L (ref 3.5–5.3)
PROT UR STRIP-MCNC: NEGATIVE MG/DL
RBC # BLD AUTO: 4.87 MILLION/UL (ref 3.81–5.12)
RBC #/AREA URNS AUTO: ABNORMAL /HPF
SARS-COV-2 RNA RESP QL NAA+PROBE: NEGATIVE
SODIUM SERPL-SCNC: 138 MMOL/L (ref 136–145)
SP GR UR STRIP.AUTO: 1.01 (ref 1–1.03)
UROBILINOGEN UR QL STRIP.AUTO: 0.2 E.U./DL
WBC # BLD AUTO: 9.15 THOUSAND/UL (ref 4.31–10.16)
WBC #/AREA URNS AUTO: ABNORMAL /HPF

## 2021-08-08 PROCEDURE — 87086 URINE CULTURE/COLONY COUNT: CPT

## 2021-08-08 PROCEDURE — 99285 EMERGENCY DEPT VISIT HI MDM: CPT | Performed by: EMERGENCY MEDICINE

## 2021-08-08 PROCEDURE — 71045 X-RAY EXAM CHEST 1 VIEW: CPT

## 2021-08-08 PROCEDURE — U0005 INFEC AGEN DETEC AMPLI PROBE: HCPCS

## 2021-08-08 PROCEDURE — 85025 COMPLETE CBC W/AUTO DIFF WBC: CPT

## 2021-08-08 PROCEDURE — 99284 EMERGENCY DEPT VISIT MOD MDM: CPT

## 2021-08-08 PROCEDURE — 36415 COLL VENOUS BLD VENIPUNCTURE: CPT

## 2021-08-08 PROCEDURE — 87186 SC STD MICRODIL/AGAR DIL: CPT

## 2021-08-08 PROCEDURE — U0003 INFECTIOUS AGENT DETECTION BY NUCLEIC ACID (DNA OR RNA); SEVERE ACUTE RESPIRATORY SYNDROME CORONAVIRUS 2 (SARS-COV-2) (CORONAVIRUS DISEASE [COVID-19]), AMPLIFIED PROBE TECHNIQUE, MAKING USE OF HIGH THROUGHPUT TECHNOLOGIES AS DESCRIBED BY CMS-2020-01-R: HCPCS

## 2021-08-08 PROCEDURE — 80048 BASIC METABOLIC PNL TOTAL CA: CPT

## 2021-08-08 PROCEDURE — 87077 CULTURE AEROBIC IDENTIFY: CPT

## 2021-08-08 PROCEDURE — 81001 URINALYSIS AUTO W/SCOPE: CPT

## 2021-08-08 RX ORDER — ACETAMINOPHEN 325 MG/1
650 TABLET ORAL ONCE
Status: COMPLETED | OUTPATIENT
Start: 2021-08-08 | End: 2021-08-08

## 2021-08-08 RX ORDER — CEPHALEXIN 500 MG/1
500 CAPSULE ORAL EVERY 12 HOURS SCHEDULED
Qty: 14 CAPSULE | Refills: 0 | Status: SHIPPED | OUTPATIENT
Start: 2021-08-08 | End: 2021-08-15

## 2021-08-08 RX ADMIN — ACETAMINOPHEN 650 MG: 325 TABLET, FILM COATED ORAL at 21:56

## 2021-08-09 NOTE — ED ATTENDING ATTESTATION
8/8/2021  IGail MD, saw and evaluated the patient  I have discussed the patient with the resident/non-physician practitioner and agree with the resident's/non-physician practitioner's findings, Plan of Care, and MDM as documented in the resident's/non-physician practitioner's note, except where noted  All available labs and Radiology studies were reviewed  I was present for key portions of any procedure(s) performed by the resident/non-physician practitioner and I was immediately available to provide assistance  At this point I agree with the current assessment done in the Emergency Department  I have conducted an independent evaluation of this patient a history and physical is as follows:    ED Course    27-year-old female history of diabetes presents with 2 episodes of chills and rigors from home  Patient reports a subjective fever however she did not take her temperature  Does report some recent history of dysuria and myalgias  On review of systems, she denies any chest pain, sore throat, rhinorrhea,   Neck pain, cough, shortness of breath, abdominal pain,  Vaginal bleeding or discharge, nausea vomiting or diarrhea, dizziness, focal weakness, Changes in vision, rashes, easy bleeding or bruising, or changes in behavior  Vital signs reviewed  Heart regular rate rhythm without murmurs  Lungs clear to auscultation bilaterally  Abdomen soft nontender nondistended normal bowel sounds no signs of peritonitis no CVA tenderness  Impression:  Fever has malaise will check screening labs including lactate to screen for sepsis  Chest x-ray urinalysis anticipate discharge home if negative workup and patient remains stable  lab studies reviewed patient does evidence of bacteria on urinalysis  Given recent is reported history of dysuria will treat for UTI    Patient does not have any signs of systemic sepsis,  Dehydration her severe pain or persistent vomiting that would preclude her from taking oral antibiotics    Discharge with oral antibiotics follow-up PCP as outpatient strict return precautions given    Critical Care Time  Procedures

## 2021-08-09 NOTE — ED PROVIDER NOTES
History  Chief Complaint   Patient presents with    Chills     pt presents ambulatory with c/o subjective fever and chills     69-year-old female with history of diabetes on metformin presents emergency department with myalgias and chills x 24 hours  Patient states symptoms began last night around 7:00 p m  And consisted of total body pain, not specific to anyone area, and feeling cold requiring multiple blankets  Patient taken Naprosyn and Tylenol and went to bed  This morning she woke up feeling normal without symptoms  Approximately 2 hours prior to arrival to ED patient had recurrence of total body myalgias and feeling cold  Denies shortness of breath, headache, cough congestion, sick contacts, urinary symptoms, nausea, or vomiting  Patient states 2 weeks ago she experienced 2-3 days of incomplete emptying of bladder which she treated with cranberry pills  Patient states this happens once per month but has never been tested, treated or diagnosed with UTI  Patient on metformin for the past 3 months  COVID vaccinated x2  Prior to Admission Medications   Prescriptions Last Dose Informant Patient Reported? Taking?    albuterol (Ventolin HFA) 90 mcg/act inhaler Not Taking at Unknown time  No No   Sig: Inhale 2 puffs every 6 (six) hours as needed for wheezing   Patient not taking: Reported on 8/8/2021   atorvastatin (LIPITOR) 40 mg tablet Not Taking at Unknown time  No No   Sig: Take 1 tablet (40 mg total) by mouth daily   Patient not taking: Reported on 8/8/2021   hydrocortisone 2 5 % cream Not Taking at Unknown time  No No   Sig: Apply topically 2 (two) times a day as needed for irritation or rash   Patient not taking: Reported on 8/8/2021   metFORMIN (GLUCOPHAGE-XR) 500 mg 24 hr tablet 8/8/2021 at Unknown time  No Yes   Sig: Take 1 tablet (500 mg total) by mouth daily with dinner   naproxen (NAPROSYN) 500 mg tablet Not Taking at Unknown time  No No   Sig: Take 1 tablet (500 mg total) by mouth 2 (two) times a day with meals   Patient not taking: Reported on 8/8/2021      Facility-Administered Medications: None       Past Medical History:   Diagnosis Date    Asthma     Back pain     Bowel incontinence     Chronic pain     History of breast implant     Psychiatric disorder     panic attacks       Past Surgical History:   Procedure Laterality Date    AUGMENTATION MAMMAPLASTY      BREAST SURGERY      lift    SC COLONOSCOPY FLX DX W/COLLJ SPEC WHEN PFRMD N/A 5/11/2017    Procedure: COLONOSCOPY;  Surgeon: Shania Frias MD;  Location: [facility]; Service: Gastroenterology       Family History   Problem Relation Age of Onset    Lung cancer Mother     Asthma Sister      I have reviewed and agree with the history as documented  E-Cigarette/Vaping    E-Cigarette Use Never User      E-Cigarette/Vaping Substances     Social History     Tobacco Use    Smoking status: Never Smoker    Smokeless tobacco: Never Used   Vaping Use    Vaping Use: Never used   Substance Use Topics    Alcohol use: No    Drug use: No        Review of Systems   All other systems reviewed and are negative  Physical Exam  ED Triage Vitals   Temperature Pulse Respirations Blood Pressure SpO2   08/08/21 1900 08/08/21 1900 08/08/21 1900 08/08/21 1900 08/08/21 1900   (!) 97 °F (36 1 °C) 87 18 130/85 96 %      Temp Source Heart Rate Source Patient Position - Orthostatic VS BP Location FiO2 (%)   08/08/21 1900 08/08/21 1900 08/08/21 2115 08/08/21 2115 --   Tympanic Monitor Lying Right arm       Pain Score       08/08/21 2115       6             Orthostatic Vital Signs  Vitals:    08/08/21 1900 08/08/21 2115   BP: 130/85 152/72   Pulse: 87 100   Patient Position - Orthostatic VS:  Lying       Physical Exam  Vitals reviewed  Constitutional:       General: She is not in acute distress  Appearance: Normal appearance  She is normal weight  She is not ill-appearing, toxic-appearing or diaphoretic     HENT:      Head: Normocephalic and atraumatic  Right Ear: External ear normal       Left Ear: External ear normal       Nose: Nose normal       Mouth/Throat:      Mouth: Mucous membranes are moist       Pharynx: Oropharynx is clear  No oropharyngeal exudate or posterior oropharyngeal erythema  Eyes:      General:         Right eye: No discharge  Left eye: No discharge  Conjunctiva/sclera: Conjunctivae normal    Cardiovascular:      Rate and Rhythm: Normal rate and regular rhythm  Pulses: Normal pulses  Heart sounds: Normal heart sounds  No murmur heard  No gallop  Pulmonary:      Effort: Pulmonary effort is normal  No respiratory distress  Breath sounds: Normal breath sounds  No wheezing or rales  Chest:      Chest wall: No tenderness  Abdominal:      General: Abdomen is flat  Bowel sounds are normal  There is no distension  Palpations: Abdomen is soft  Tenderness: There is no abdominal tenderness  There is no guarding  Musculoskeletal:         General: No swelling, tenderness, deformity or signs of injury  Normal range of motion  Cervical back: No rigidity or tenderness  Lymphadenopathy:      Cervical: No cervical adenopathy  Skin:     General: Skin is warm and dry  Capillary Refill: Capillary refill takes less than 2 seconds  Coloration: Skin is not jaundiced or pale  Neurological:      Mental Status: She is alert           ED Medications  Medications   acetaminophen (TYLENOL) tablet 650 mg (650 mg Oral Given 8/8/21 2156)       Diagnostic Studies  Results Reviewed     Procedure Component Value Units Date/Time    Novel Coronavirus [facility] [[id number]]  (Normal) Collected: 08/08/21 2158    Lab Status: Final result Specimen: Nares from Nose Updated: 08/08/21 2316     SARS-CoV-2 Negative    Narrative:           Basic metabolic panel [[id number]] Collected: 08/08/21 2157    Lab Status: Final result Specimen: Blood from Line, Venous Updated: 08/08/21 2233 Sodium 138 mmol/L      Potassium 4 1 mmol/L      Chloride 107 mmol/L      CO2 27 mmol/L      ANION GAP 4 mmol/L      BUN 11 mg/dL      Creatinine 0 89 mg/dL      Glucose 111 mg/dL      Calcium 9 3 mg/dL      eGFR 68 ml/min/1 73sq m     Narrative:      [place] guidelines for Chronic Kidney Disease (CKD):     Stage 1 with normal or high GFR (GFR > 90 mL/min/1 73 square meters)    Stage 2 Mild CKD (GFR = 60-89 mL/min/1 73 square meters)    Stage 3A Moderate CKD (GFR = 45-59 mL/min/1 73 square meters)    Stage 3B Moderate CKD (GFR = 30-44 mL/min/1 73 square meters)    Stage 4 Severe CKD (GFR = 15-29 mL/min/1 73 square meters)    Stage 5 End Stage CKD (GFR <15 mL/min/1 73 square meters)  Note: GFR calculation is accurate only with a steady state creatinine    CBC and differential [[id number]] Collected: 08/08/21 2157    Lab Status: Final result Specimen: Blood from Line, Venous Updated: 08/08/21 2226     WBC 9 15 Thousand/uL      RBC 4 87 Million/uL      Hemoglobin 13 8 g/dL      Hematocrit 43 4 %      MCV 89 fL      MCH 28 3 pg      MCHC 31 8 g/dL      RDW 13 2 %      MPV 11 6 fL      Platelets 757 Thousands/uL      nRBC 0 /100 WBCs      Neutrophils Relative 69 %      Immat GRANS % 0 %      Lymphocytes Relative 20 %      Monocytes Relative 10 %      Eosinophils Relative 1 %      Basophils Relative 0 %      Neutrophils Absolute 6 22 Thousands/µL      Immature Grans Absolute 0 04 Thousand/uL      Lymphocytes Absolute 1 87 Thousands/µL      Monocytes Absolute 0 93 Thousand/µL      Eosinophils Absolute 0 07 Thousand/µL      Basophils Absolute 0 02 Thousands/µL     Urine Microscopic [[id number]]  (Abnormal) Collected: 08/08/21 2126    Lab Status: Final result Specimen: Urine, Clean Catch Updated: 08/08/21 2141     RBC, UA None Seen /hpf      WBC, UA 10-20 /hpf      Epithelial Cells None Seen /hpf      Bacteria, UA Moderate /hpf      Hyaline Casts, UA None Seen /lpf     Urine culture [[id number]] Collected: 08/08/21 2126    Lab Status: In process Specimen: Urine, Clean Catch Updated: 08/08/21 2141    Urine Macroscopic, POC [[id number]]  (Abnormal) Collected: 08/08/21 2126    Lab Status: Final result Specimen: Urine Updated: 08/08/21 2128     Color, UA Yellow     Clarity, UA Clear     pH, UA 7 0     Leukocytes, UA Small     Nitrite, UA Negative     Protein, UA Negative mg/dl      Glucose, UA Negative mg/dl      Ketones, UA Negative mg/dl      Urobilinogen, UA 0 2 E U /dl      Bilirubin, UA Negative     Blood, UA Negative     Specific Gravity, UA 1 015    Narrative:      CLINITEK RESULT    UA w Reflex to Microscopic w Reflex to Culture [[id number]]     Lab Status: No result Specimen: Urine, Clean Catch                  XR chest portable   ED Interpretation by Himanshu Sotomayor MD (08/08 2233)   Normal CXR            Procedures  Procedures      ED Course                             SBIRT 22yo+      Most Recent Value   SBIRT (25 yo +)   In order to provide better care to our patients, we are screening all of our patients for alcohol and drug use  Would it be okay to ask you these screening questions? No Filed at: 08/08/2021 2110   Initial Alcohol Screen: US AUDIT-C    1  How often do you have a drink containing alcohol?  0 Filed at: 08/08/2021 2110   Audit-C Score  0 Filed at: 08/08/2021 2110                MDM  Number of Diagnoses or Management Options  UTI (urinary tract infection): new and requires workup  Viral illness: new and does not require workup  Diagnosis management comments: Nontoxic appearing, afebrile  Impression:  Viral exanthem versus UTI  Infectious workup ordered CBC, BMP, UA, chest x-ray, COVID swab  CBC, BMP, chest x-ray normal   COVID swab pending  Moderate bacteria in the urine  Will send for culture and treat with 7 days Keflex  Naproxen and Tylenol for myalgias with strict return precautions         Amount and/or Complexity of Data Reviewed  Clinical lab tests: ordered  Tests in the radiology section of CPT®: ordered  Review and summarize past medical records: yes  Independent visualization of images, tracings, or specimens: yes    Risk of Complications, Morbidity, and/or Mortality  Presenting problems: low  Diagnostic procedures: minimal  Management options: minimal    Patient Progress  Patient progress: improved      Disposition  Final diagnoses:   Viral illness   UTI (urinary tract infection)     Time reflects when diagnosis was documented in both MDM as applicable and the Disposition within this note     Time User Action Codes Description Comment    8/8/2021 10:49 PM Ancil Haylee Add [B34 9] Viral illness     8/8/2021 10:49 PM Ancil Haylee Add [N39 0] UTI (urinary tract infection)       ED Disposition     ED Disposition Condition Date/Time Comment    Discharge Stable Sun Aug 8, 2021 10:49 PM [facility] discharge to home/self care              Follow-up Information    None         Discharge Medication List as of 8/8/2021 11:00 PM      START taking these medications    Details   cephalexin (KEFLEX) 500 mg capsule Take 1 capsule (500 mg total) by mouth every 12 (twelve) hours for 7 days, Starting Sun 8/8/2021, Until Sun 8/15/2021, Print         CONTINUE these medications which have NOT CHANGED    Details   metFORMIN (GLUCOPHAGE-XR) 500 mg 24 hr tablet Take 1 tablet (500 mg total) by mouth daily with dinner, Starting Thu 4/29/2021, Normal      albuterol (Ventolin HFA) 90 mcg/act inhaler Inhale 2 puffs every 6 (six) hours as needed for wheezing, Starting Fri 11/13/2020, Normal      atorvastatin (LIPITOR) 40 mg tablet Take 1 tablet (40 mg total) by mouth daily, Starting Mon 7/19/2021, Normal      hydrocortisone 2 5 % cream Apply topically 2 (two) times a day as needed for irritation or rash, Starting Mon 7/19/2021, Normal      naproxen (NAPROSYN) 500 mg tablet Take 1 tablet (500 mg total) by mouth 2 (two) times a day with meals, Starting Sat 6/26/2021, Normal           No discharge procedures on file  PDMP Review     None           ED Provider  Attending physically available and evaluated Brigida Virginia  I managed the patient along with the ED Attending      Electronically Signed by         Colleen Montiel MD  08/09/21 9264

## 2021-08-09 NOTE — DISCHARGE INSTRUCTIONS
Return to emergency department if the pain worsens, you develop fevers, or have difficulty breathing  Take Naprosyn and Tylenol as directed on the bottle for the next 5 days  Follow-up with your family doctor  Take antibiotics as prescribed for the entire 7 days

## 2021-08-09 NOTE — ED NOTES
Dr Sharon Rosario at the bedside for patient evaluation at this time     Raffaele Singh, RN  08/08/21 6525

## 2021-08-11 LAB — BACTERIA UR CULT: ABNORMAL

## 2021-09-05 DIAGNOSIS — E11.9 DIABETES MELLITUS TYPE 2 IN NONOBESE (HCC): ICD-10-CM

## 2021-09-07 RX ORDER — METFORMIN HYDROCHLORIDE 500 MG/1
TABLET, EXTENDED RELEASE ORAL
Qty: 30 TABLET | Refills: 2 | Status: SHIPPED | OUTPATIENT
Start: 2021-09-07 | End: 2021-11-05 | Stop reason: SDUPTHER

## 2021-10-15 ENCOUNTER — APPOINTMENT (OUTPATIENT)
Dept: LAB | Facility: CLINIC | Age: 66
End: 2021-10-15

## 2021-10-15 DIAGNOSIS — E11.9 DIABETES MELLITUS TYPE 2 IN NONOBESE (HCC): ICD-10-CM

## 2021-10-15 LAB
CREAT UR-MCNC: 60.5 MG/DL
EST. AVERAGE GLUCOSE BLD GHB EST-MCNC: 146 MG/DL
HBA1C MFR BLD: 6.7 %
MICROALBUMIN UR-MCNC: <5 MG/L (ref 0–20)
MICROALBUMIN/CREAT 24H UR: <8 MG/G CREATININE (ref 0–30)

## 2021-10-15 PROCEDURE — 83036 HEMOGLOBIN GLYCOSYLATED A1C: CPT

## 2021-10-15 PROCEDURE — 82570 ASSAY OF URINE CREATININE: CPT | Performed by: STUDENT IN AN ORGANIZED HEALTH CARE EDUCATION/TRAINING PROGRAM

## 2021-10-15 PROCEDURE — 82043 UR ALBUMIN QUANTITATIVE: CPT | Performed by: STUDENT IN AN ORGANIZED HEALTH CARE EDUCATION/TRAINING PROGRAM

## 2021-10-15 PROCEDURE — 36415 COLL VENOUS BLD VENIPUNCTURE: CPT

## 2021-11-05 ENCOUNTER — OFFICE VISIT (OUTPATIENT)
Dept: INTERNAL MEDICINE CLINIC | Facility: CLINIC | Age: 66
End: 2021-11-05

## 2021-11-05 ENCOUNTER — TELEPHONE (OUTPATIENT)
Dept: PSYCHIATRY | Facility: CLINIC | Age: 66
End: 2021-11-05

## 2021-11-05 VITALS
BODY MASS INDEX: 25.06 KG/M2 | WEIGHT: 146 LBS | DIASTOLIC BLOOD PRESSURE: 93 MMHG | TEMPERATURE: 97.7 F | SYSTOLIC BLOOD PRESSURE: 138 MMHG | OXYGEN SATURATION: 97 % | HEART RATE: 75 BPM

## 2021-11-05 DIAGNOSIS — Z23 NEED FOR INFLUENZA VACCINATION: Primary | ICD-10-CM

## 2021-11-05 DIAGNOSIS — F41.8 SITUATIONAL ANXIETY: ICD-10-CM

## 2021-11-05 DIAGNOSIS — F43.21 INSOMNIA SECONDARY TO SITUATIONAL DEPRESSION: ICD-10-CM

## 2021-11-05 DIAGNOSIS — R03.0 ELEVATED BLOOD PRESSURE READING: ICD-10-CM

## 2021-11-05 DIAGNOSIS — E78.5 HYPERLIPIDEMIA, UNSPECIFIED HYPERLIPIDEMIA TYPE: Chronic | ICD-10-CM

## 2021-11-05 DIAGNOSIS — Z13.820 SCREENING FOR OSTEOPOROSIS: ICD-10-CM

## 2021-11-05 DIAGNOSIS — F51.05 INSOMNIA SECONDARY TO SITUATIONAL DEPRESSION: ICD-10-CM

## 2021-11-05 DIAGNOSIS — F32.A DEPRESSION, UNSPECIFIED DEPRESSION TYPE: ICD-10-CM

## 2021-11-05 DIAGNOSIS — F43.21 SITUATIONAL DEPRESSION: ICD-10-CM

## 2021-11-05 DIAGNOSIS — E11.9 DIABETES MELLITUS TYPE 2 IN NONOBESE (HCC): ICD-10-CM

## 2021-11-05 DIAGNOSIS — R93.7 ABNORMAL BONE DENSITY SCREENING: ICD-10-CM

## 2021-11-05 PROCEDURE — 90662 IIV NO PRSV INCREASED AG IM: CPT | Performed by: INTERNAL MEDICINE

## 2021-11-05 PROCEDURE — 90471 IMMUNIZATION ADMIN: CPT | Performed by: INTERNAL MEDICINE

## 2021-11-05 PROCEDURE — 99214 OFFICE O/P EST MOD 30 MIN: CPT | Performed by: INTERNAL MEDICINE

## 2021-11-05 RX ORDER — ATORVASTATIN CALCIUM 40 MG/1
40 TABLET, FILM COATED ORAL DAILY
Qty: 90 TABLET | Refills: 1 | Status: SHIPPED | OUTPATIENT
Start: 2021-11-05 | End: 2022-05-02

## 2021-11-05 RX ORDER — METFORMIN HYDROCHLORIDE 500 MG/1
500 TABLET, EXTENDED RELEASE ORAL 2 TIMES DAILY WITH MEALS
Qty: 180 TABLET | Refills: 2 | Status: SHIPPED | OUTPATIENT
Start: 2021-11-05 | End: 2022-05-02

## 2021-11-05 RX ORDER — ADHESIVE BANDAGE 3/4"
BANDAGE TOPICAL DAILY
Qty: 14 EACH | Refills: 0 | Status: SHIPPED | OUTPATIENT
Start: 2021-11-05

## 2021-11-09 ENCOUNTER — TELEPHONE (OUTPATIENT)
Dept: PSYCHIATRY | Facility: CLINIC | Age: 66
End: 2021-11-09

## 2021-12-10 ENCOUNTER — TELEPHONE (OUTPATIENT)
Dept: PSYCHIATRY | Facility: CLINIC | Age: 66
End: 2021-12-10

## 2022-02-08 ENCOUNTER — TELEPHONE (OUTPATIENT)
Dept: DIABETES SERVICES | Facility: OTHER | Age: 67
End: 2022-02-08

## 2022-02-08 NOTE — TELEPHONE ENCOUNTER
LM w/ patient to call back to schedule an appointment for DM Education    Yasmeen Schneider, RDN, LDN, [facility]

## 2022-02-15 ENCOUNTER — TELEPHONE (OUTPATIENT)
Dept: DIABETES SERVICES | Facility: OTHER | Age: 67
End: 2022-02-15

## 2022-02-15 NOTE — TELEPHONE ENCOUNTER
Left second message for patient to call to schedule an appointment for DM Education    Yasmeen Schneider, RDN, LDN, [facility]

## 2022-02-21 ENCOUNTER — TELEPHONE (OUTPATIENT)
Dept: DIABETES SERVICES | Facility: OTHER | Age: 67
End: 2022-02-21

## 2022-02-21 NOTE — TELEPHONE ENCOUNTER
Spoke with patient, she agreed to appointment w/ me on 3/2/22 for DM Education    Yasmeen Schneider, JORGEN, LDN, [facility]

## 2022-03-01 ENCOUNTER — TELEPHONE (OUTPATIENT)
Dept: DIABETES SERVICES | Facility: OTHER | Age: 67
End: 2022-03-01

## 2022-03-01 NOTE — TELEPHONE ENCOUNTER
Spoke with patient, she verbalized that she plans on coming to appointment tomorrow    Yasmeen Schneider, JORGEN, LDN, [facility]

## 2022-03-02 ENCOUNTER — OFFICE VISIT (OUTPATIENT)
Dept: DIABETES SERVICES | Facility: OTHER | Age: 67
End: 2022-03-02

## 2022-03-02 DIAGNOSIS — E11.9 DIABETES MELLITUS TYPE 2 IN NONOBESE (HCC): ICD-10-CM

## 2022-03-02 PROCEDURE — G0108 DIAB MANAGE TRN  PER INDIV: HCPCS | Performed by: DIETITIAN, REGISTERED

## 2022-03-02 NOTE — Clinical Note
Patient came for individual Diabetes assessment  She is very happy that her A1C is in a good range  She is eating healthy & is physically active  Please be aware that she just stopped taking her Metformin XR, she told me she wants to see how her sugars are without prescription medicine  She is taking an over the counter supplement: "Berberine with cinnamon" that she ordered on-line  She did not have the supplement with her, just wrote the name down for me  I explained that her doctors need to know this, I explained that I would be letting PCP and Referring Provider know

## 2022-03-02 NOTE — PROGRESS NOTES
Type 2 Diabetes Class Assessment    HPI: Met with Soumya Bailey for DSME Initial visit  Leonela Alexander has Type 2 Diabetes  Diabetes Assessment  Visit Type: Initial visit  Present at Session: patient   Special Learning Needs: No  Barriers to Learning: no barriers    How do you feel about making lifestyle changes at this time? Very good  How would you rate your current knowledge of diabetes? very good  How confident are you that will be able to take better control of your diabetes?: very good    How long have you had diabetes? She states she just found out a few months ago  Have you had diabetes education in the past?: No  Do you have any family members with diabetes?: No  Do you monitor your blood sugar? yes  Type of blood sugar monitor: One Touch from [employer]  How old is your meter?: new  How often do you test your blood sugars?:2x/day  Do you keep a written record of your blood sugars? No   Blood sugar log with patient today and reviewed by educator?: verbally reviewed  Blood Sugar ranges:    Fastin - 123      2 hours after meals: 130 to 140  Any financial concerns pertaining to your diabetes supplies, medication or care?: Yes - met with financial counselors  Have you ever experienced hypoglycemia?:  No  Have you ever been hospitalized or gone to the ER due to your blood sugars?: No  How do you treat low blood sugars?: n/a  How do you treat high blood sugars? Recently not high at all  Do you wear a Diabetes I D ?: no  Where do you dispose of your sharps (needles,lancetes)?: uses the same lancet about 2 to 3 x, then throws in the trash; reviewed proper handling with patient  Ht Readings from Last 1 Encounters:   21 5' 4" (1 626 m)     Wt Readings from Last 3 Encounters:   21 66 2 kg (146 lb)   21 68 1 kg (150 lb 3 2 oz)   21 64 9 kg (143 lb)        There is no height or weight on file to calculate BMI       Lab Results   Component Value Date    HGBA1C 6 7 (H) 10/15/2021    HGBA1C 7 0 (H) 04/28/2021       No results found for: CHOL  Lab Results   Component Value Date    HDL 55 04/28/2021    HDL 54 01/30/2020    HDL 52 02/12/2019     Lab Results   Component Value Date    LDLCALC 183 (H) 04/28/2021    LDLCALC 158 (H) 01/30/2020    LDLCALC 151 (H) 02/12/2019     Lab Results   Component Value Date    TRIG 166 (H) 04/28/2021    TRIG 150 01/30/2020    TRIG 257 (H) 02/12/2019     No results found for: CHOLHDL  No results found for: Beau Delong    Weight Change: No    Diet Assessment    Do you follow any special diet presently?: Yes - patient states she eats very healthy, has cut out all sugar/ sweets  Eats lunch & dinner, high fiber vegetables with lean protein  Drinks water & black coffee, and an Apple cider vinegar drink & home made smoothie for breakfast, sometimes later in the evening  Who cooks at home?:  patient  Who does the grocery shopping?: patient   How frequently do you eat out?: hardly ever    Activity Assessment    Exercise: goes to [employer], 2x/week; uses treadmill & lifts weights; 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)      Lifestyle/Social Assessment    Racial/ethnic group:                                       Primary Language: Lao  Marital Status: Single  Education Level: High School Graduate   Work status: Full Time  Type of job and 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, 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[employer]"   Who lives in your household?: child  Who is you primary support person(s): relative(s)   Describe your quality of life currently?: good  Any concerns for your safety?: No  Any Spiritism or cultural practices that may affect your diabetes care: No  Do you have a decrease or loss of hearing?: No  Do you have a decrease or loss of vision?: No  When was the last time you had an ophthalmology exam?: she went to an optometrist about 3 months ago  When was the last time you had dental exam?: about a year ago  Do you check your feet for cracks, sores, debris?: No  When was the last time you had podiatry or foot exam?: " every time I see the doctor they check"   Last flu shot?: 11/5/21  Pneumonia shot?: Yes - 1/30/20      The patient's history was reviewed and updated as appropriate: allergies, current medications  Intervention    Diabetes Overview :   Nila Jensen was instructed on basic concepts of diabetes, including identifying role of diabetes self management, basic pathophysiology and types of diabetes, A1c and blood sugar targets  Nila Jensen has good understanding of material covered  Taking Medications: Instructed patient on action, side effects, efficacy, prescribed dosage and appropriate timing and frequency of administration of her diabetes medication  Nila Jensen has good understanding of material covered  She states she just stopped taking her Metformin and is taking an herbal supplement instead, & wants to see how her blood sugars will be  I explained to her that I will be notifying her PCP about this, she understood  Monitoring Blood Sugars  Instructions for Meter Teaching- Patient instructed in the following:  Testing frequency: Encouraged patient to continue to check her sugars about 2x/day  Goal Blood Sugars:   Premeal , even better <110  2hr after a meal <180, even better <140  A1C <7%, even better <6 5%  Hypoglycemia: Instructed patient on definition/risk of hypoglycemia, treatment, causes/symptoms, when to notify provider of lows, prevention of hypoglycemia and exercise precautions  Comments: Nila Jensen verbalizes understanding of hypoglycemia concepts      Physical Activity: Discussed benefits of physical activity to optimize blood glucose control, encouraged activity at patient is physically able  Always consult a physician prior to starting an exercise program   Comments: Kenith Boas understanding of benefits of physical activity     Diabetes Education Record  Nila Jensen received the following handouts: My Plate, food/ portion list, What is your A1c? 4 steps to control your Diabetes, Hypoglycemia - all in [place]  Patient response to instruction    Comprehensionvery good  Motivationvery good  Expected Compliancevery good    Thank you for referring your patient to [facility], it was a pleasure working with them today  Please feel free to call with any questions or concerns at       Yasmeen Schneider, JORGEN, LDN, [facility]

## 2022-04-26 ENCOUNTER — TELEPHONE (OUTPATIENT)
Dept: INTERNAL MEDICINE CLINIC | Facility: CLINIC | Age: 67
End: 2022-04-26

## 2022-04-26 NOTE — TELEPHONE ENCOUNTER
I called and spoke to patient she will go for blood work this week  She is aware and confirmed she will be coming to Mondays appointment

## 2022-04-29 ENCOUNTER — LAB (OUTPATIENT)
Dept: LAB | Facility: CLINIC | Age: 67
End: 2022-04-29

## 2022-04-29 DIAGNOSIS — E78.5 HYPERLIPIDEMIA, UNSPECIFIED HYPERLIPIDEMIA TYPE: Chronic | ICD-10-CM

## 2022-04-29 DIAGNOSIS — E11.9 DIABETES MELLITUS TYPE 2 IN NONOBESE (HCC): ICD-10-CM

## 2022-04-29 LAB
CHOLEST SERPL-MCNC: 230 MG/DL
EST. AVERAGE GLUCOSE BLD GHB EST-MCNC: 126 MG/DL
HBA1C MFR BLD: 6 %
HDLC SERPL-MCNC: 59 MG/DL
LDLC SERPL CALC-MCNC: 157 MG/DL (ref 0–100)
TRIGL SERPL-MCNC: 70 MG/DL

## 2022-04-29 PROCEDURE — 80061 LIPID PANEL: CPT

## 2022-04-29 PROCEDURE — 83036 HEMOGLOBIN GLYCOSYLATED A1C: CPT

## 2022-04-29 PROCEDURE — 36415 COLL VENOUS BLD VENIPUNCTURE: CPT

## 2022-05-02 ENCOUNTER — OFFICE VISIT (OUTPATIENT)
Dept: INTERNAL MEDICINE CLINIC | Facility: CLINIC | Age: 67
End: 2022-05-02

## 2022-05-02 VITALS
SYSTOLIC BLOOD PRESSURE: 129 MMHG | DIASTOLIC BLOOD PRESSURE: 75 MMHG | HEIGHT: 64 IN | OXYGEN SATURATION: 98 % | WEIGHT: 133 LBS | TEMPERATURE: 98 F | BODY MASS INDEX: 22.71 KG/M2 | RESPIRATION RATE: 16 BRPM | HEART RATE: 81 BPM

## 2022-05-02 DIAGNOSIS — E11.9 DIABETES MELLITUS TYPE 2 IN NONOBESE (HCC): ICD-10-CM

## 2022-05-02 DIAGNOSIS — Z12.12 SCREENING FOR COLORECTAL CANCER: ICD-10-CM

## 2022-05-02 DIAGNOSIS — Z12.31 ENCOUNTER FOR SCREENING MAMMOGRAM FOR BREAST CANCER: ICD-10-CM

## 2022-05-02 DIAGNOSIS — Z00.00 ANNUAL PHYSICAL EXAM: Primary | ICD-10-CM

## 2022-05-02 DIAGNOSIS — Z12.11 SCREENING FOR COLORECTAL CANCER: ICD-10-CM

## 2022-05-02 DIAGNOSIS — E78.5 HYPERLIPIDEMIA, UNSPECIFIED HYPERLIPIDEMIA TYPE: Chronic | ICD-10-CM

## 2022-05-02 PROCEDURE — 99397 PER PM REEVAL EST PAT 65+ YR: CPT | Performed by: INTERNAL MEDICINE

## 2022-05-02 RX ORDER — METFORMIN HYDROCHLORIDE 500 MG/1
500 TABLET, EXTENDED RELEASE ORAL
Qty: 180 TABLET | Refills: 2 | Status: SHIPPED | OUTPATIENT
Start: 2022-05-02 | End: 2022-07-31

## 2022-05-02 NOTE — PROGRESS NOTES
[address] [facility]    NAME: Annalise Luu  AGE: 77 y o  SEX: female  : 1955     DATE: 2022     Assessment and Plan:     Problem List Items Addressed This Visit        Endocrine    Diabetes mellitus type 2 in nonobese ([address] )       Other    Hyperlipidemia (Chronic)      Other Visit Diagnoses     Annual physical exam    -  Primary    Encounter for screening mammogram for breast cancer        Relevant Orders    Mammo screening bilateral w 3d & cad    Screening for colorectal cancer            Annual physical exam   Patient general good health except for described below   · Screening mammogram ordered   · Colorectal screening as described below   · Up-to-date on lab work   · Follow-up in 3 months for management of her diabetes    Diabetes mellitus type 2   Well controlled, A1c of 6 0   · Continue diabetic diet   · Continue metformin 500 mg b i d  Hyperlipidemia   Stable   · Patient self discontinued Lipitor, counseled on importance of statin long therapy however she would like to forego medication and favor lifestyle modification  Mild intermittent asthma   Stable   · Continue albuterol inhaler as needed    Immunizations and preventive care screenings were discussed with patient today  Appropriate education was printed on patient's after visit summary  Counseling:  · Alcohol/drug use: discussed moderation in alcohol intake, the recommendations for healthy alcohol use, and avoidance of illicit drug use  Return in about 3 months (around 2022) for Recheck DMT2       Chief Complaint:     Chief Complaint   Patient presents with    Follow-up    Diabetes      History of Present Illness:     Adult Annual Physical   59-year-old female with past medical history of well-controlled diabetes, hyperlipidemia visiting an office visit for annual physical   She is feeling well and has no acute complaints including chest pain, shortness of breath, nausea, vomiting  She is adhering to a diabetic diet  he self discontinue Lipitor as she wants to modify her diet  She also decreased her metformin to 500 mg daily  She checks her blood sugar daily and reports numbers between 100-130  Diet and Physical Activity  · Diet/Nutrition: well balanced diet  · Exercise: no formal exercise  Depression Screening  PHQ-2/9 Depression Screening         General Health  · Sleep: sleeps well  · Hearing: normal - bilateral   · Vision: no vision problems  · Dental: regular dental visits  /GYN Health  · Patient is: postmenopausal  · Last menstrual period: Unknown  · Contraceptive method: Unknown     Review of Systems:     Review of Systems   Constitutional: Negative for chills and fever  HENT: Negative for congestion and sinus pressure  Respiratory: Negative for cough and shortness of breath  Cardiovascular: Negative for chest pain, palpitations and leg swelling  Gastrointestinal: Negative for abdominal pain, diarrhea, nausea and vomiting  Genitourinary: Negative for dysuria and hematuria  Musculoskeletal: Negative for arthralgias and back pain  Skin: Negative for color change and rash  Neurological: Negative for dizziness and headaches  Psychiatric/Behavioral: Negative for agitation and confusion  All other systems reviewed and are negative  Past Medical History:     Past Medical History:   Diagnosis Date    Asthma     Back pain     Bowel incontinence     Chronic pain     History of breast implant     Psychiatric disorder     panic attacks      Past Surgical History:     Past Surgical History:   Procedure Laterality Date    AUGMENTATION MAMMAPLASTY      BREAST SURGERY      lift    VA COLONOSCOPY FLX DX W/COLLJ SPEC WHEN PFRMD N/A 5/11/2017    Procedure: COLONOSCOPY;  Surgeon: Meyer Peabody, MD;  Location: [facility];   Service: Gastroenterology      Social History:     Social History     Socioeconomic History    Marital status: Single     Spouse name: None    Number of children: None    Years of education: None    Highest education level: None   Occupational History    None   Tobacco Use    Smoking status: Never Smoker    Smokeless tobacco: Never Used   Vaping Use    Vaping Use: Never used   Substance and Sexual Activity    Alcohol use: No    Drug use: No    Sexual activity: Not Currently   Other Topics Concern    None   Social History Narrative    Financial status inadequate or marginal      Social Determinants of Health     Financial Resource Strain: Medium Risk    Difficulty of Paying Living Expenses: Somewhat hard   Food Insecurity: No Food Insecurity    Worried About Running Out of Food in the Last Year: Never true    Nehemiah of Food in the Last Year: Never true   Transportation Needs: No Transportation Needs    Lack of Transportation (Medical): No    Lack of Transportation (Non-Medical): No   Physical Activity: Sufficiently Active    Days of Exercise per Week: 5 days    Minutes of Exercise per Session: 60 min   Stress: No Stress Concern Present    Feeling of Stress :  Only a little   Social Connections: Not on file   Intimate Partner Violence: Not At Risk    Fear of Current or Ex-Partner: No    Emotionally Abused: No    Physically Abused: No    Sexually Abused: No   Housing Stability: Unknown    Unable to Pay for Housing in the Last Year: No    Number of Places Lived in the Last Year: Not on file    Unstable Housing in the Last Year: No      Family History:     Family History   Problem Relation Age of Onset    Lung cancer Mother     Asthma Sister       Current Medications:     Current Outpatient Medications   Medication Sig Dispense Refill    albuterol (Ventolin HFA) 90 mcg/act inhaler Inhale 2 puffs every 6 (six) hours as needed for wheezing 18 g 1    atorvastatin (LIPITOR) 40 mg tablet Take 1 tablet (40 mg total) by mouth daily 90 tablet 1    Blood Pressure Monitoring (Blood Pressure Cuff) MISC Use daily Arm cuff only 14 each 0    hydrocortisone 2 5 % cream Apply topically 2 (two) times a day as needed for irritation or rash (Patient not taking: Reported on 8/8/2021) 30 g 0    metFORMIN (GLUCOPHAGE-XR) 500 mg 24 hr tablet Take 1 tablet (500 mg total) by mouth 2 (two) times a day with meals 180 tablet 2    naproxen (NAPROSYN) 500 mg tablet Take 1 tablet (500 mg total) by mouth 2 (two) times a day with meals 30 tablet 0     No current facility-administered medications for this visit  Allergies:     No Known Allergies   Physical Exam:     There were no vitals taken for this visit  Physical Exam  Vitals and nursing note reviewed  Constitutional:       General: She is not in acute distress  Appearance: Normal appearance  She is well-developed  She is not ill-appearing  HENT:      Head: Normocephalic and atraumatic  Mouth/Throat:      Mouth: Mucous membranes are moist    Eyes:      Extraocular Movements: Extraocular movements intact  Conjunctiva/sclera: Conjunctivae normal       Pupils: Pupils are equal, round, and reactive to light  Cardiovascular:      Rate and Rhythm: Normal rate and regular rhythm  Pulses: Normal pulses  Heart sounds: Normal heart sounds  No murmur heard  No friction rub  No gallop  Pulmonary:      Effort: Pulmonary effort is normal  No respiratory distress  Breath sounds: Normal breath sounds  No wheezing or rales  Abdominal:      General: Abdomen is flat  Bowel sounds are normal  There is no distension  Palpations: Abdomen is soft  Tenderness: There is no abdominal tenderness  There is no guarding  Musculoskeletal:      Cervical back: Neck supple  Right lower leg: No edema  Left lower leg: No edema  Skin:     General: Skin is warm and dry  Neurological:      General: No focal deficit present  Mental Status: She is alert and oriented to person, place, and time     Psychiatric:         Mood and Affect: Mood normal          Behavior: Behavior normal            Mariola Ferrara, [address]

## 2022-05-02 NOTE — PATIENT INSTRUCTIONS
Diabetes y ejercicio   LO QUE NECESITA SABER:   La actividad física, butch el ejercicio, puede ayudarlo a mantener estable gonzales nivel de azúcar en la kalyn o mejorar la resistencia a la insulina  La actividad puede ayudar a disminuir el riesgo de enfermedad cardíaca y Juilen Severs a perder peso  El ejercicio puede ayudar a bajar gonzales A1c  Gonzales equipo de cuidado de la diabetes lo ayudará a crear un plan de ejercicio  El plan se basará en el tipo de diabetes que tenga y en gonzales estado físico inicial   INSTRUCCIONES SOBRE EL [facility]PITAL[facility]:   Llame al [facility] de emergencias local (911 en los [place]) si:  · Usted tiene dolor en el pecho o falta de aire  Regrese a la nora de emergencias si:  · Usted tiene un nivel bajo azúcar en la kalyn y el problema no mejora con el tratamiento  Los síntomas son dificultad para pensar, latidos cardíacos adelso y sudoración  · Gonzales nivel de azúcar en la kalyn está por encima de 240 mg/dl y no baja dentro de los 15 minutos del tratamiento  · Usted tiene visión borrosa o doble  · Gonzales aliento tiene un [employer] a frutas o gonzales respiración es superficial     Llame a gonzales médico o al equipo de atención diabética si:  · Usted tiene cetonas en la kalyn o en la orina  · Tiene fiebre  · Ale niveles de azúcar en la kalyn son superiores a las metas fijadas  · Usted tiene bajos niveles de azúcar en gonzales kalyn frecuentemente  · Gonzales piel está enrojecida, seca, caliente al tacto o inflamada  · Usted tiene tess herida que no cicatriza  · Usted tiene problemas para sobrellevar gonzales diabetes o se siente ansioso o deprimido  · Usted tiene preguntas o inquietudes acerca de gonzales condición o cuidado  Consejos para ayudarlo a crear y cumplir con ale metas de ejercicio:  · Fíjese un objetivo de 150 minutos (2 5 horas) de [place] moderada a vigorosa cada semana  La actividad Manoj ayuda a que gonzales corazón se mantenga peg   La actividad aeróbica incluye caminar, andar en Louvella Spanner, nadar y rastrillar las hojas  Distribuya la [place] en 3 a 5 días  No deje pasar más de 2 días seguidos sin [place]  Lo mejor es hacer al menos 10 minutos cada vez y 27 minutos cada día  Puede progresar hasta llegar a estas metas  Recuerde que cualquier actividad es mejor que [place]  Con el tiempo, puede hacer que el ejercicio sea más intenso o que dure [place]  También puede añadir más días de ejercicio a medida que gonzales nivel de condición física mejore  Gonzales equipo de atención de la diabetes puede ayudarlo a hacer un plan paso a paso para lograr ale metas  · Establezca tess meta para el entrenamiento de fuerza que sea de 2 a 3 veces por semana  [place]ómese al menos 1 día frank entre las sesiones de entrenamiento de fuerza  El entrenamiento de fuerza le ayuda a mantener los músculos que ya tiene y a desarrollar nuevos  El entrenamiento de fuerza incluye levantamiento de pesas, subir escaleras, hacer yoga y main chi          · Los adultos mayores deben incluir entrenamiento del equilibrio de 2 a 3 veces a la semana  Se trata de caminar hacia atrás, pararse sobre un pie y caminar en línea recta haciendo punta talón  Otros consejos de ejercicio saludable:  · Johan estiramiento antes y después de hacer ejercicio para evitar lesiones  · [place] agua o líquidos que no contengan azúcar antes, carol ann y [facility]  Pregúntele a gonzales dietista o médico cuáles líquidos debería ely cuando se ejercite  · No permanezca sentado por más de 30 minutos cada vez carol ann el día  Si usted no puede caminar, al [employer]  [place] le ayudará a permanecer activo y mantener la circulación sanguínea  Ejercicio y nivel de azúcar en la kalyn: Revise gonzales nivel de azúcar en la kalyn antes y después de hacer tess actividad física si [place] insulina   Puede que los [employer] sugieran que cambie la cantidad de insulina que usted fredy o los alimentos que consume  · Si gonzales nivel de azúcar en la kalyn es alto, revise gonzales kalyn u [place] para hansel si hay cetonas y hágalo antes de ejercitarse  No fred ejercicio si gonzales nivel de azúcar en la kalyn es alto y usted tiene cetonas  · Si gonzales nivel de azúcar en la kalyn está por debajo de los 100 mg/dl, coma tess merienda con carbohidratos antes de hacer ejercicio  Por ejemplo 4 a 6 galletas de soda, 1/2 banano, 1 taza (8 onzas) de [place], medio vaso (4 onzas) de jugo  Acuda a ale consultas de control con gonzales médico o con el equipo de cuidado de la diabetes según le indicaron: Anote ale preguntas para que se acuerde de hacerlas carol ann ale visitas  © Copyright [employer] 2022 Information is for End User's use only and may not be sold, redistributed or otherwise used for commercial purposes  All illustrations and images included in CareNotes® are the copyrighted property of [employer]  or [address] [place] [address] es sólo para uso en educación  Gonzales intención no es darle un consejo médico sobre enfermedades o tratamientos  Colsulte con gonzales Viva Hint farmacéutico antes de seguir cualquier régimen médico para saber si es seguro y efectivo para usted  Wellness Visit for Adults   AMBULATORY CARE:   A wellness visit  is when you see your healthcare provider to get screened for health problems  Your healthcare provider will also give you advice on how to stay healthy  Write down your questions so you remember to ask them  Ask your healthcare provider how often you should have a wellness visit  What happens at a wellness visit:  Your healthcare provider will ask about your health, and your family history of health problems  This includes high blood pressure, heart disease, and cancer  He or she will ask if you have symptoms that concern you, if you smoke, and about your mood  You may also be asked about your intake of medicines, supplements, food, and alcohol   Any of the following may be done:  · Your weight  will be checked  Your height may also be checked so your body mass index (BMI) can be calculated  Your BMI shows if you are at a healthy weight  · Your blood pressure  and heart rate will be checked  Your temperature may also be checked  · Blood and urine tests  may be done  Blood tests may be done to check your cholesterol levels  Abnormal cholesterol levels increase your risk for heart disease and stroke  You may also need a blood or urine test to check for diabetes if you are at increased risk  Urine tests may be done to look for signs of an infection or kidney disease  · A physical exam  includes checking your heartbeat and lungs with a stethoscope  Your healthcare provider may also check your skin to look for sun damage  · Screening tests  may be recommended  A screening test is done to check for diseases that may not cause symptoms  The screening tests you may need depend on your age, gender, family history, and lifestyle habits  For example, colorectal screening may be recommended if you are 48years old or older  Screening tests you need if you are a woman:   · A Pap smear  is used to screen for cervical cancer  Pap smears are usually done every 3 to 5 years depending on your age  You may need them more often if you have had abnormal Pap smear test results in the past  Ask your healthcare provider how often you should have a Pap smear  · A mammogram  is an x-ray of your breasts to screen for breast cancer  Experts recommend mammograms every 2 years starting at age 48 years  You may need a mammogram at age 52 years or younger if you have an increased risk for breast cancer  Talk to your healthcare provider about when you should start having mammograms and how often you need them  Vaccines you may need:   · Get an influenza vaccine  every year  The influenza vaccine protects you from the flu  Several types of viruses cause the flu   The viruses change over time, so new vaccines are made each year  · Get a tetanus-diphtheria (Td) booster vaccine  every 10 years  This vaccine protects you against tetanus and diphtheria  Tetanus is a severe infection that may cause painful muscle spasms and lockjaw  Diphtheria is a severe bacterial infection that causes a thick covering in the back of your mouth and throat  · Get a human papillomavirus (HPV) vaccine  if you are female and aged 23 to 32 or male 23 to 24 and never received it  This vaccine protects you from HPV infection  HPV is the most common infection spread by sexual contact  HPV may also cause vaginal, penile, and anal cancers  · Get a pneumococcal vaccine  if you are aged 72 years or older  The pneumococcal vaccine is an injection given to protect you from pneumococcal disease  Pneumococcal disease is an infection caused by pneumococcal bacteria  The infection may cause pneumonia, meningitis, or an ear infection  · Get a shingles vaccine  if you are 60 or older, even if you have had shingles before  The shingles vaccine is an injection to protect you from the varicella-zoster virus  This is the same virus that causes chickenpox  Shingles is a painful rash that develops in people who had chickenpox or have been exposed to the virus  How to eat healthy:  My Plate is a model for planning healthy meals  It shows the types and amounts of foods that should go on your plate  Fruits and vegetables make up about half of your plate, and grains and protein make up the other half  A serving of dairy is included on the side of your plate  The amount of calories and serving sizes you need depends on your age, gender, weight, and height  Examples of healthy foods are listed below:  · Eat a variety of vegetables  such as dark green, red, and orange vegetables  You can also include canned vegetables low in sodium (salt) and frozen vegetables without added butter or sauces      · Eat a variety of fresh fruits , canned fruit in 100% juice, frozen fruit, and dried fruit  · Include whole grains  At least half of the grains you eat should be whole grains  Examples include whole-wheat bread, wheat pasta, brown rice, and whole-grain cereals such as oatmeal     · Eat a variety of protein foods such as seafood (fish and shellfish), lean meat, and poultry without skin (turkey and chicken)  Examples of lean meats include pork leg, shoulder, or tenderloin, and beef round, sirloin, tenderloin, and extra lean ground beef  Other protein foods include eggs and egg substitutes, beans, peas, soy products, nuts, and seeds  · Choose low-fat dairy products such as skim or 1% milk or low-fat yogurt, cheese, and cottage cheese  · Limit unhealthy fats  such as butter, hard margarine, and shortening  Exercise:  Exercise at least 30 minutes per day on most days of the week  Some examples of exercise include walking, biking, dancing, and swimming  You can also fit in more physical activity by taking the stairs instead of the elevator or parking farther away from stores  Include muscle strengthening activities 2 days each week  Regular exercise provides many health benefits  It helps you manage your weight, and decreases your risk for type 2 diabetes, heart disease, stroke, and high blood pressure  Exercise can also help improve your mood  Ask your healthcare provider about the best exercise plan for you  General health and safety guidelines:   · Do not smoke  Nicotine and other chemicals in cigarettes and cigars can cause lung damage  Ask your healthcare provider for information if you currently smoke and need help to quit  E-cigarettes or smokeless tobacco still contain nicotine  Talk to your healthcare provider before you use these products  · Limit alcohol  A drink of alcohol is 12 ounces of beer, 5 ounces of wine, or 1½ ounces of liquor  · Lose weight, if needed  Being overweight increases your risk of certain health conditions   These include heart disease, high blood pressure, type 2 diabetes, and certain types of cancer  · Protect your skin  Do not sunbathe or use tanning beds  Use sunscreen with a SPF 15 or higher  Apply sunscreen at least 15 minutes before you go outside  Reapply sunscreen every 2 hours  Wear protective clothing, hats, and sunglasses when you are outside  · Drive safely  Always wear your seatbelt  Make sure everyone in your car wears a seatbelt  A seatbelt can save your life if you are in an accident  Do not use your cell phone when you are driving  This could distract you and cause an accident  Pull over if you need to make a call or send a text message  · Practice safe sex  Use latex condoms if are sexually active and have more than one partner  Your healthcare provider may recommend screening tests for sexually transmitted infections (STIs)  · Wear helmets, lifejackets, and protective gear  Always wear a helmet when you ride a bike or motorcycle, go skiing, or play sports that could cause a head injury  Wear protective equipment when you play sports  Wear a lifejacket when you are on a boat or doing water sports  © Copyright [employer] 2022 Information is for End User's use only and may not be sold, redistributed or otherwise used for commercial purposes  All illustrations and images included in CareNotes® are the copyrighted property of [employer]  or Tabitha Urrutia   The above information is an  only  It is not intended as medical advice for individual conditions or treatments  Talk to your doctor, nurse or pharmacist before following any medical regimen to see if it is safe and effective for you

## 2022-05-31 NOTE — RESULT ENCOUNTER NOTE
Please call pt regarding recent blood work  A1c appears to be improved  Unfortunately, lipid panel continues to show HLD  Given pt has elevated ASCVD score, high intensity statin therapy should be initiated  I would like pt to schedule an apt to discuss this prior to initiating

## 2022-06-03 DIAGNOSIS — E11.9 DIABETES MELLITUS TYPE 2 IN NONOBESE (HCC): Primary | ICD-10-CM

## 2022-06-03 DIAGNOSIS — E78.5 HYPERLIPIDEMIA, UNSPECIFIED HYPERLIPIDEMIA TYPE: ICD-10-CM

## 2022-06-08 ENCOUNTER — TELEPHONE (OUTPATIENT)
Dept: INTERNAL MEDICINE CLINIC | Facility: CLINIC | Age: 67
End: 2022-06-08

## 2022-06-08 DIAGNOSIS — E78.5 HYPERLIPIDEMIA, UNSPECIFIED HYPERLIPIDEMIA TYPE: Primary | ICD-10-CM

## 2022-06-08 RX ORDER — ATORVASTATIN CALCIUM 40 MG/1
40 TABLET, FILM COATED ORAL DAILY
Qty: 90 TABLET | Refills: 2 | Status: SHIPPED | OUTPATIENT
Start: 2022-06-08 | End: 2022-09-06

## 2022-06-08 NOTE — TELEPHONE ENCOUNTER
Pt amendable to start high intensity lipid lowering therapy  Script for Lipitor 40mg w dinner sent to pharmacy    -She may start it when she receives it    -She should get fasting blood work prior to next visit to review her cholesterol levels with doctor at that time  Thank you

## 2022-07-11 NOTE — ED ATTENDING ATTESTATION
6/26/2021  IHarper MD, saw and evaluated the patient  I have discussed the patient with the resident/non-physician practitioner and agree with the resident's/non-physician practitioner's findings, Plan of Care, and MDM as documented in the resident's/non-physician practitioner's note, except where noted  All available labs and Radiology studies were reviewed  I was present for key portions of any procedure(s) performed by the resident/non-physician practitioner and I was immediately available to provide assistance  At this point I agree with the current assessment done in the Emergency Department  I have conducted an independent evaluation of this patient a history and physical is as follows:    Patient presents the emergency department chief complaint headache  The patient reports that yesterday morning she developed a mild holocranial headache that was gradual in onset  Throughout the course of the day over approximately 12 hours the headache continued to worsen and reached its maximal intensity last evening  The patient reports the headache has been constant and throbbing since then  The patient reports the headache is holocranial and not associated with any neck or back pain  Patient also reports mild generalized myalgias, and her extremities and in her lower back  The patient denies any nausea, vomiting, change in vision, change in comprehension, change in speech, trauma, fever, or systemic symptoms  The patient reports this feels identical to headache she had approximately 3 years ago that was treated in the emergency department medications  The patient denies a history of hypertension  The patient admits to history of diabetes but states she has not been taking her oral diabetes medications  The patient reports no sick contacts or exposure to carbon monoxide  Physical exam demonstrates a female who appears to be nontoxic and in no apparent distress    The eyes were normal   The End of 91-day monitoring period 7/11/22. Remote transmission received for patients single chamber ICD. Transmission shows normal sensing and pacing function. EP physician will review. See interrogation under the cardiology tab in the [address] [place] [address] [address] field for more details. Will continue to monitor remotely. Hx PAF-RVR/SVT/NSVT. (eliquis, toprol xl). No  arrhythmias recorded. Corvue is within normal limits. SVT recorded on 2/15/22. Morphology discriminator match. NSVT recordings prob SVT based on rates-no EGM to view. 1 NSVT event 2/14/22-no EGM to view w/ rate 205 bpm x 12 sec. No therapies delivered. Ami Osborn MD  [facility] March  2/15/2022 event is likely SVT, but was sustained. ears are normal   The neck was supple and nontender  Throat was normal   There is mild tenderness to scalp palpation over the entire upper scalp  The thoracic back was nontender  There is minimal bilateral lumbar paraspinal muscle tenderness without any midline tenderness to palpation or percussion  Patient had normal strength and sensation all extremities  Gait was normal   The patient was alert and oriented x3 with normal cranial nerves    Mental status was normal     ED Course         Critical Care Time  Procedures

## 2022-09-20 ENCOUNTER — OFFICE VISIT (OUTPATIENT)
Dept: INTERNAL MEDICINE CLINIC | Facility: CLINIC | Age: 67
End: 2022-09-20

## 2022-09-20 VITALS
HEIGHT: 64 IN | WEIGHT: 138 LBS | DIASTOLIC BLOOD PRESSURE: 114 MMHG | TEMPERATURE: 97.2 F | SYSTOLIC BLOOD PRESSURE: 151 MMHG | OXYGEN SATURATION: 98 % | HEART RATE: 96 BPM | BODY MASS INDEX: 23.56 KG/M2

## 2022-09-20 DIAGNOSIS — R15.2 INCONTINENCE OF FECES WITH FECAL URGENCY: Primary | ICD-10-CM

## 2022-09-20 DIAGNOSIS — E78.5 HYPERLIPIDEMIA, UNSPECIFIED HYPERLIPIDEMIA TYPE: ICD-10-CM

## 2022-09-20 DIAGNOSIS — R15.9 INCONTINENCE OF FECES WITH FECAL URGENCY: Primary | ICD-10-CM

## 2022-09-20 DIAGNOSIS — E11.9 TYPE 2 DIABETES MELLITUS WITHOUT COMPLICATION, WITHOUT LONG-TERM CURRENT USE OF INSULIN (HCC): ICD-10-CM

## 2022-09-20 PROCEDURE — 99213 OFFICE O/P EST LOW 20 MIN: CPT | Performed by: INTERNAL MEDICINE

## 2022-09-20 RX ORDER — CALCIUM POLYCARBOPHIL 625 MG 625 MG/1
625 TABLET ORAL DAILY
Qty: 30 TABLET | Refills: 0 | Status: SHIPPED | OUTPATIENT
Start: 2022-09-20 | End: 2022-10-20

## 2022-09-20 NOTE — PROGRESS NOTES
[facility]  INTERNAL MEDICINE OFFICE VISIT     PATIENT INFORMATION     Mary Patel   77 y o  female   MRN: [id number]    ASSESSMENT/PLAN     Diagnoses and all orders for this visit:    Incontinence of feces with fecal urgency  Urge incontinence with no concern or cauda equina, neuropathy, concomitant bladder incontinence or gastric reflex in relation to food  Will start fiber supplements to help bulk up the stool and decrease frequency of incontinence   -     calcium polycarbophil (FIBERCON) 625 mg tablet; Take 1 tablet (625 mg total) by mouth daily  -     Ambulatory referral for colonoscopy; Future  -     Return in 3 months for recheck   -     Note will be needed outlining patients condition for court hearing regarding her ticket    Type 2 diabetes mellitus without complication, without long-term current use of insulin (HCC)  -     HEMOGLOBIN A1C W/ EAG ESTIMATION; Future  -     Continue metformin 500mg daily     Hyperlipidemia, unspecified hyperlipidemia type  -     Lipid Panel with Direct LDL reflex; Future  -     Currently doesn't take lipitor because of the concerns for side effects, will reassess need once lipid panel is back     Schedule a follow-up appointment in 3 months  HEALTH MAINTENANCE     Immunization History   Administered Date(s) Administered    COVID-19 MODERNA VACC 0 5 ML IM 03/29/2021, 04/30/2021    INFLUENZA 11/23/2013, 05/13/2014, 03/04/2016, 03/16/2018, 01/04/2019, 12/06/2019    Influenza, high dose seasonal 0 7 mL 11/05/2021    Influenza, injectable, quadrivalent, preservative free 0 5 mL 09/18/2020    Pneumococcal Polysaccharide PPV23 01/30/2020    Tdap 04/01/2011, 12/06/2019    Tetanus Immune Globulin 04/01/2011     CHIEF COMPLAINT     Chief Complaint   Patient presents with    Encopresis      HISTORY OF PRESENT ILLNESS     78 yo F with hx of DM on metformin, HLD not taking lipitor presenting with worsening fecal incontinence   Patient states this has been an issue for the last 2 5 years and previously it would happen once a month where she wouldn't be able to hold her stool in but now it happens everyday  Patient states she was working as a [employer]  and was stuck in traffic, had a sudden urge to go to the bathroom and tried to take the nearest exit but was stopped by the police and even though she stated she had an emergency she was required to stay in that area and ended up soiling her pants  She states she has had mulitple episodes of soiling her pants and has been trying to eat foods with higher fiber but has not been actively taking fiber supplements  Her stool is brown and formed, denies melena or diarrhea  She states she had constipation previously but no longer deals with that now  She states there is no relationship with food  Denies abdominal pain nausea vomiting chest pain fever chills headaches back pain numbness or tingling  Denies bladder incontinence  Patient had a colonoscopy back in 2017 for which was noted to have diverticulosis and is supposed to get a colonoscopy in 2022  However patient states that she does not have insurance at this time so getting a colonoscopy may be difficult this year  REVIEW OF SYSTEMS     Review of Systems   Constitutional: Negative for chills and fever  HENT: Negative for ear pain and sore throat  Eyes: Negative for pain and visual disturbance  Respiratory: Negative for cough and shortness of breath  Cardiovascular: Negative for chest pain and palpitations  Gastrointestinal: Negative for abdominal pain and vomiting  Genitourinary: Positive for urgency  Negative for dysuria and hematuria  Musculoskeletal: Negative for arthralgias and back pain  Skin: Negative for color change and rash  Neurological: Negative for seizures and syncope  All other systems reviewed and are negative      OBJECTIVE     Vitals:    09/20/22 1549   BP: (!) 151/114   BP Location: Left arm   Patient Position: Sitting   Cuff Size: Large   Pulse: 96   Temp: (!) 97 2 °F (36 2 °C)   TempSrc: Temporal   SpO2: 98%   Weight: 62 6 kg (138 lb)   Height: 5' 4" (1 626 m)     Physical Exam  Vitals and nursing note reviewed  Constitutional:       General: She is not in acute distress  Appearance: She is well-developed  HENT:      Head: Normocephalic and atraumatic  Eyes:      Conjunctiva/sclera: Conjunctivae normal    Cardiovascular:      Rate and Rhythm: Normal rate and regular rhythm  Pulses: Normal pulses  Heart sounds: Normal heart sounds  No murmur heard  Pulmonary:      Effort: Pulmonary effort is normal  No respiratory distress  Breath sounds: Normal breath sounds  Abdominal:      General: Abdomen is flat  There is no distension  Palpations: Abdomen is soft  There is no mass  Tenderness: There is no abdominal tenderness  There is no guarding  Musculoskeletal:         General: Normal range of motion  Cervical back: Neck supple  Right lower leg: No edema  Left lower leg: No edema  Comments: No back pain   Skin:     General: Skin is warm and dry  Neurological:      General: No focal deficit present  Mental Status: She is alert and oriented to person, place, and time  Sensory: No sensory deficit  Motor: No weakness        Coordination: Coordination normal       Gait: Gait normal        CURRENT MEDICATIONS     Current Outpatient Medications:     albuterol (Ventolin HFA) 90 mcg/act inhaler, Inhale 2 puffs every 6 (six) hours as needed for wheezing, Disp: 18 g, Rfl: 1    atorvastatin (LIPITOR) 40 mg tablet, Take 1 tablet (40 mg total) by mouth daily, Disp: 90 tablet, Rfl: 2    Blood Pressure Monitoring (Blood Pressure Cuff) MISC, Use daily Arm cuff only, Disp: 14 each, Rfl: 0    metFORMIN (GLUCOPHAGE-XR) 500 mg 24 hr tablet, Take 1 tablet (500 mg total) by mouth daily with breakfast, Disp: 180 tablet, Rfl: 2    naproxen (NAPROSYN) 500 mg tablet, Take 1 tablet (500 mg total) by mouth 2 (two) times a day with meals, Disp: 30 tablet, Rfl: 0    PAST MEDICAL & SURGICAL HISTORY     Past Medical History:   Diagnosis Date    Asthma     Back pain     Bowel incontinence     Chronic pain     History of breast implant     Psychiatric disorder     panic attacks     Past Surgical History:   Procedure Laterality Date    AUGMENTATION MAMMAPLASTY      BREAST SURGERY      lift    ND COLONOSCOPY FLX DX W/COLLJ SPEC WHEN PFRMD N/A 5/11/2017    Procedure: COLONOSCOPY;  Surgeon: Tsering Messina MD;  [facility]: [facility];   Service: Gastroenterology     SOCIAL & FAMILY HISTORY     Social History     Socioeconomic History    Marital status: Single     Spouse name: Not on file    Number of children: Not on file    Years of education: Not on file    Highest education level: Not on file   Occupational History    Not on file   Tobacco Use    Smoking status: Never Smoker    Smokeless tobacco: Never Used   Vaping Use    Vaping Use: Never used   Substance and Sexual Activity    Alcohol use: No    Drug use: No    Sexual activity: Not Currently   Other Topics Concern    Not on file   Social History Narrative    Financial status inadequate or marginal      Social Determinants of Health     Financial Resource Strain: Not on file   Food Insecurity: Not on file   Transportation Needs: Not on file   Physical Activity: Not on file   Stress: Not on file   Social Connections: Not on file   Intimate Partner Violence: Not on file   Housing Stability: Not on file     Social History     Substance and Sexual Activity   Alcohol Use No     Social History     Substance and Sexual Activity   Drug Use No     Social History     Tobacco Use   Smoking Status Never Smoker   Smokeless Tobacco Never Used     Family History   Problem Relation Age of Onset    Lung cancer Mother     Asthma Sister                ==  Frederick Mack MD  PGY-1  [facility]    [facility] [address] [place] [address] , [address] [address] [facility], [address] [facility]  Office: [phone]  Fax: [phone]

## 2022-09-20 NOTE — PATIENT INSTRUCTIONS
Please start taking fiber supplements daily   Lab work ordered to check cholesterol   Colonoscopy referral ordered

## 2022-10-03 ENCOUNTER — TELEPHONE (OUTPATIENT)
Dept: INTERNAL MEDICINE CLINIC | Facility: CLINIC | Age: 67
End: 2022-10-03

## 2022-10-03 NOTE — TELEPHONE ENCOUNTER
Please review as you're covering for Dr Eva Canas   In patient's check out note from 9/20/22, Dr Eva Canas wrote "  May need a note in the future for court hearing outlining her fecal incontinence and her inability to hold in her stool and how that affects her life

## 2022-10-03 NOTE — TELEPHONE ENCOUNTER
Please call patient that letter for excusing her from in person court hearing has been completed and filed in her chart  Patient may still have to present over phone or virtually pending 's decision  Thanks!

## 2022-10-03 NOTE — TELEPHONE ENCOUNTER
Patient met with Dr Herr on 9/20/22 in relations to fecal incontinence and a incident that the patient had which caused her to get pulled over by a   She has a hearing scheduled on 10/05/22 at 10:45am  Patient spoke to Dr Herr in depth at the time of her appointment and was to get a letter to present to the  but the letter has not been generated  Patient urges the need of this letter being completed before her court hearing  Please forward to Dr Herr so letter can be written and call patient once done

## 2022-10-21 NOTE — TELEPHONE ENCOUNTER
Patient came into office stating that the letter she needs for court needs to say what exactly her medical conditions are  The letter should not say that she can not travel     Patient needs this letter before 10/26/22 as this her court day

## 2022-11-16 ENCOUNTER — OFFICE VISIT (OUTPATIENT)
Dept: OBGYN CLINIC | Facility: CLINIC | Age: 67
End: 2022-11-16

## 2022-11-16 VITALS
WEIGHT: 139.8 LBS | HEIGHT: 64 IN | SYSTOLIC BLOOD PRESSURE: 145 MMHG | BODY MASS INDEX: 23.87 KG/M2 | DIASTOLIC BLOOD PRESSURE: 84 MMHG | HEART RATE: 76 BPM

## 2022-11-16 DIAGNOSIS — Z12.31 ENCOUNTER FOR SCREENING MAMMOGRAM FOR MALIGNANT NEOPLASM OF BREAST: Primary | ICD-10-CM

## 2023-02-14 ENCOUNTER — HOSPITAL ENCOUNTER (EMERGENCY)
Facility: HOSPITAL | Age: 68
Discharge: HOME | End: 2023-02-14
Attending: EMERGENCY MEDICINE

## 2023-02-14 ENCOUNTER — APPOINTMENT (EMERGENCY)
Dept: RADIOLOGY | Facility: HOSPITAL | Age: 68
End: 2023-02-14

## 2023-02-14 VITALS
OXYGEN SATURATION: 95 % | SYSTOLIC BLOOD PRESSURE: 138 MMHG | HEART RATE: 114 BPM | TEMPERATURE: 99 F | RESPIRATION RATE: 20 BRPM | DIASTOLIC BLOOD PRESSURE: 81 MMHG

## 2023-02-14 DIAGNOSIS — J45.901 ASTHMA EXACERBATION: Primary | ICD-10-CM

## 2023-02-14 RX ORDER — ALBUTEROL SULFATE 90 UG/1
2 AEROSOL, METERED RESPIRATORY (INHALATION) ONCE
Status: COMPLETED | OUTPATIENT
Start: 2023-02-14 | End: 2023-02-14

## 2023-02-14 RX ORDER — PREDNISONE 20 MG/1
40 TABLET ORAL ONCE
Status: COMPLETED | OUTPATIENT
Start: 2023-02-14 | End: 2023-02-14

## 2023-02-14 RX ORDER — SODIUM CHLORIDE FOR INHALATION 0.9 %
3 VIAL, NEBULIZER (ML) INHALATION ONCE
Status: COMPLETED | OUTPATIENT
Start: 2023-02-14 | End: 2023-02-14

## 2023-02-14 RX ORDER — PREDNISONE 20 MG/1
40 TABLET ORAL DAILY
Qty: 10 TABLET | Refills: 0 | Status: CANCELLED | OUTPATIENT
Start: 2023-02-14 | End: 2023-02-19

## 2023-02-14 RX ORDER — PREDNISONE 20 MG/1
40 TABLET ORAL DAILY
Qty: 10 TABLET | Refills: 0 | Status: SHIPPED | OUTPATIENT
Start: 2023-02-14 | End: 2023-02-19

## 2023-02-14 RX ORDER — ALBUTEROL SULFATE 2.5 MG/3ML
2.5 SOLUTION RESPIRATORY (INHALATION) EVERY 6 HOURS PRN
Qty: 75 ML | Refills: 0 | Status: CANCELLED | OUTPATIENT
Start: 2023-02-14

## 2023-02-14 RX ADMIN — ISODIUM CHLORIDE 3 ML: 0.03 SOLUTION RESPIRATORY (INHALATION) at 20:02

## 2023-02-14 RX ADMIN — IPRATROPIUM BROMIDE 1 MG: 0.5 SOLUTION RESPIRATORY (INHALATION) at 20:02

## 2023-02-14 RX ADMIN — ALBUTEROL SULFATE 2 PUFF: 90 AEROSOL, METERED RESPIRATORY (INHALATION) at 21:11

## 2023-02-14 RX ADMIN — ALBUTEROL SULFATE 10 MG: 2.5 SOLUTION RESPIRATORY (INHALATION) at 20:02

## 2023-02-14 RX ADMIN — PREDNISONE 40 MG: 20 TABLET ORAL at 20:01

## 2023-02-15 NOTE — ED PROVIDER NOTES
History  Chief Complaint   Patient presents with   • Asthma     Per pt - has had asthma exacerbation all day, using albuterol inhaler with no relief     59-year-old female with known history of asthma presents for dyspnea, wheezing x1 day  Has associated nonproductive cough  Denies fever, congestion, productive cough, chest pain, abdominal pain, urinary symptoms  States that she has been using her albuterol inhaler without much relief  Prior to Admission Medications   Prescriptions Last Dose Informant Patient Reported? Taking? Blood Pressure Monitoring (Blood Pressure Cuff) MISC   No No   Sig: Use daily Arm cuff only   albuterol (Ventolin HFA) 90 mcg/act inhaler   No No   Sig: Inhale 2 puffs every 6 (six) hours as needed for wheezing   atorvastatin (LIPITOR) 40 mg tablet   No No   Sig: Take 1 tablet (40 mg total) by mouth daily   calcium polycarbophil (FIBERCON) 625 mg tablet   No No   Sig: Take 1 tablet (625 mg total) by mouth daily   metFORMIN (GLUCOPHAGE-XR) 500 mg 24 hr tablet   No No   Sig: Take 1 tablet (500 mg total) by mouth daily with breakfast   naproxen (NAPROSYN) 500 mg tablet   No No   Sig: Take 1 tablet (500 mg total) by mouth 2 (two) times a day with meals   Patient not taking: Reported on 11/16/2022      Facility-Administered Medications: None       Past Medical History:   Diagnosis Date   • Asthma    • Back pain    • Bowel incontinence    • Chronic pain    • History of breast implant    • Psychiatric disorder     panic attacks       Past Surgical History:   Procedure Laterality Date   • AUGMENTATION MAMMAPLASTY     • BREAST SURGERY      lift   • [place] COLONOSCOPY FLX DX W/COLLJ SPEC WHEN PFRMD N/A 5/11/2017    Procedure: COLONOSCOPY;  Surgeon: Federico Melgar MD;  Location[facility]; Service: Gastroenterology       Family History   Problem Relation Age of Onset   • Lung cancer Mother    • Asthma Sister      I have reviewed and agree with the history as documented      E-Cigarette/Vaping • E-Cigarette Use Never User      E-Cigarette/Vaping Substances   • Nicotine No    • THC No    • CBD No    • Flavoring No    • Other No    • Unknown No      Social History     Tobacco Use   • Smoking status: Never   • Smokeless tobacco: Never   Vaping Use   • Vaping Use: Never used   Substance Use Topics   • Alcohol use: No   • Drug use: No        Review of Systems   Constitutional: Negative for chills and fever  HENT: Negative for ear pain and sore throat  Eyes: Negative for pain and visual disturbance  Respiratory: Positive for cough and wheezing  Negative for shortness of breath  Cardiovascular: Negative for chest pain and palpitations  Gastrointestinal: Negative for abdominal pain and vomiting  Genitourinary: Negative for dysuria and hematuria  Musculoskeletal: Negative for arthralgias and back pain  Skin: Negative for color change and rash  Neurological: Negative for seizures and syncope  All other systems reviewed and are negative  Physical Exam  ED Triage Vitals [02/14/23 1853]   Temperature Pulse Respirations Blood Pressure SpO2   99 °F (37 2 °C) 105 20 (!) 173/102 98 %      Temp Source Heart Rate Source Patient Position - Orthostatic VS BP Location FiO2 (%)   Oral Monitor Sitting Right arm --      Pain Score       --             Orthostatic Vital Signs  Vitals:    02/14/23 1853 02/14/23 2114   BP: (!) 173/102 138/81   Pulse: 105 (!) 114   Patient Position - Orthostatic VS: Sitting        Physical Exam  Vitals and nursing note reviewed  Constitutional:       General: She is not in acute distress  Appearance: She is well-developed  She is not ill-appearing, toxic-appearing or diaphoretic  HENT:      Head: Normocephalic and atraumatic  Eyes:      Conjunctiva/sclera: Conjunctivae normal    Cardiovascular:      Rate and Rhythm: Normal rate and regular rhythm  Heart sounds: Normal heart sounds  No murmur heard  No friction rub     Pulmonary:      Effort: Pulmonary effort is normal  No respiratory distress  Breath sounds: Wheezing present  Abdominal:      General: There is no distension  Palpations: Abdomen is soft  Tenderness: There is no abdominal tenderness  There is no guarding or rebound  Musculoskeletal:         General: No swelling, tenderness, deformity or signs of injury  Cervical back: Neck supple  Skin:     General: Skin is warm and dry  Capillary Refill: Capillary refill takes less than 2 seconds  Coloration: Skin is not jaundiced or pale  Neurological:      General: No focal deficit present  Mental Status: She is alert and oriented to person, place, and time  Psychiatric:         Mood and Affect: Mood normal          Behavior: Behavior normal          ED Medications  Medications   albuterol inhalation solution 10 mg (10 mg Nebulization Given 2/14/23 2002)     And   ipratropium (ATROVENT) 0 02 % inhalation solution 1 mg (1 mg Nebulization Given 2/14/23 2002)     And   sodium chloride 0 9 % inhalation solution 3 mL (3 mL Nebulization Given 2/14/23 2002)   predniSONE tablet 40 mg (40 mg Oral Given 2/14/23 2001)   albuterol (PROVENTIL HFA,VENTOLIN HFA) inhaler 2 puff (2 puffs Inhalation Given 2/14/23 2111)       Diagnostic Studies  Results Reviewed     None                 XR chest 2 views   Final Result by Nicki Avalos MD (02/15 1011)      No acute cardiopulmonary disease  Workstation performed: [id number]               Procedures  Procedures      ED Course                                       Medical Decision Making  15-year-old female with known history of asthma presents for dyspnea, wheezing x1 day  Vital stable  Physical exam remarkable for bilateral wheezing  Patient is satting well at room air  Symptoms most likely consistent with asthma  No other concern for pneumonia, pneumothorax, ACS, bronchitis  Patient was evaluated with chest x-ray and treated with heart neb, prednisone  Patient had symptomatic relief after breathing treatment and requested to be discharged  Discharged with prescription for prednisone and albuterol refills  Discussed strict return precautions and outpatient follow-up  Asthma exacerbation: acute illness or injury  Amount and/or Complexity of Data Reviewed  Radiology: ordered  Risk  Prescription drug management  Disposition  Final diagnoses:   Asthma exacerbation     Time reflects when diagnosis was documented in both MDM as applicable and the Disposition within this note     Time User Action Codes Description Comment    2/14/2023  9:03 PM Tsering Valdez Gordo [R71 [id number]] Asthma exacerbation       ED Disposition     ED Disposition   Discharge    Condition   Stable    Date/Time   Tue Feb 14, 2023  9:03 PM    Comment   Ro Larson discharge to home/self care                 Follow-up Information     Follow up With Specialties Details Why Contact Info Additional Andrzejuth In 1 week  [address] [facility] [address]  \[facility] [place] [facility]\""[facility], [address] [place], [place], [address] Emergency Department Emergency Medicine  If symptoms worsen [address] [address] [address]  [address] Emergency Department, [address] [place], [place], [address]          Discharge Medication List as of 2/14/2023  9:12 PM      START taking these medications    Details   predniSONE 20 mg tablet Take 2 tablets (40 mg total) by mouth daily for 5 days, Starting Tue 2/14/2023, Until Sun 2/19/2023, Normal         CONTINUE these medications which have NOT CHANGED    Details   albuterol (Ventolin HFA) 90 mcg/act inhaler Inhale 2 puffs every 6 (six) hours as needed for wheezing, Starting Fri 11/13/2020, Normal atorvastatin (LIPITOR) 40 mg tablet Take 1 tablet (40 mg total) by mouth daily, Starting Wed 6/8/2022, Until Tue 9/6/2022, Normal      Blood Pressure Monitoring (Blood Pressure Cuff) MISC Use daily Arm cuff only, Starting Fri 11/5/2021, Normal      calcium polycarbophil (FIBERCON) 625 mg tablet Take 1 tablet (625 mg total) by mouth daily, Starting Tue 9/20/2022, Until Thu 10/20/2022, Normal      metFORMIN (GLUCOPHAGE-XR) 500 mg 24 hr tablet Take 1 tablet (500 mg total) by mouth daily with breakfast, Starting Mon 5/2/2022, Until Tue 9/20/2022, Normal      naproxen (NAPROSYN) 500 mg tablet Take 1 tablet (500 mg total) by mouth 2 (two) times a day with meals, Starting Sat 6/26/2021, Normal           No discharge procedures on file  PDMP Review     None           ED Provider  Attending physically available and evaluated Dennisyoung Lester  ASHER managed the patient along with the ED Attending      Electronically Signed by         Socorro Graff DO  02/16/23 0954

## 2023-02-15 NOTE — DISCHARGE INSTRUCTIONS
Please continue using your inhaler as needed  Take prednisone for 5 days  Please follow with your pcp outpatient for further management and treatment

## 2023-02-15 NOTE — ED ATTENDING ATTESTATION
2/14/2023  IShiva MD, saw and evaluated the patient  I have discussed the patient with the resident/non-physician practitioner and agree with the resident's/non-physician practitioner's findings, Plan of Care, and MDM as documented in the resident's/non-physician practitioner's note, except where noted  All available labs and Radiology studies were reviewed  I was present for key portions of any procedure(s) performed by the resident/non-physician practitioner and I was immediately available to provide assistance  At this point I agree with the current assessment done in the Emergency Department  I have conducted an independent evaluation of this patient a history and physical is as follows:    ED Course         Critical Care Time  Procedures    78 yo female with hx of asthma, having sob, sore throat, wheezing, no relief with albuterol inhaler  Pt with no fever, having cough  No cp, no abodminal pain, no n/v/d  Vss, afebrile, lungs bilateral wheezes, rrr, abdomen soft nontender  Cxr, oneill neb, steroids

## 2023-02-20 ENCOUNTER — APPOINTMENT (OUTPATIENT)
Dept: LAB | Facility: CLINIC | Age: 68
End: 2023-02-20

## 2023-02-20 ENCOUNTER — CONSULT (OUTPATIENT)
Dept: GASTROENTEROLOGY | Facility: CLINIC | Age: 68
End: 2023-02-20

## 2023-02-20 VITALS
WEIGHT: 139 LBS | DIASTOLIC BLOOD PRESSURE: 84 MMHG | SYSTOLIC BLOOD PRESSURE: 130 MMHG | HEART RATE: 80 BPM | HEIGHT: 64 IN | TEMPERATURE: 98.6 F | BODY MASS INDEX: 23.73 KG/M2

## 2023-02-20 DIAGNOSIS — Z12.12 SCREENING FOR COLORECTAL CANCER: ICD-10-CM

## 2023-02-20 DIAGNOSIS — R15.9 INCONTINENCE OF FECES WITH FECAL URGENCY: ICD-10-CM

## 2023-02-20 DIAGNOSIS — E11.9 DIABETES MELLITUS TYPE 2 IN NONOBESE (HCC): ICD-10-CM

## 2023-02-20 DIAGNOSIS — Z12.11 SCREENING FOR COLORECTAL CANCER: ICD-10-CM

## 2023-02-20 DIAGNOSIS — R15.2 INCONTINENCE OF FECES WITH FECAL URGENCY: ICD-10-CM

## 2023-02-20 DIAGNOSIS — R15.2 FECAL URGENCY: Primary | ICD-10-CM

## 2023-02-20 LAB
CHOLEST SERPL-MCNC: 209 MG/DL
HDLC SERPL-MCNC: 70 MG/DL
LDLC SERPL CALC-MCNC: 116 MG/DL (ref 0–100)
TRIGL SERPL-MCNC: 114 MG/DL

## 2023-02-20 NOTE — PROGRESS NOTES
[facility] - Outpatient Consultation  Semaj Glass 79 y o  female MRN: [id number]  Encounter: [id number]      Assessment and Plan    1  Colorectal cancer screening  2  Fecal urgency   The patient's last colonoscopy was in 2017 revealing diverticulosis  Repeat was recommended 5 years later  The patient states that she has been having some intermittent fecal urgency with incontinence  This is worse during times of stress  She states that prior to going out of the house she attempts to have a bowel movement and this has helped improve her symptoms  It occurs approximately once every few months  It is very bothersome to the patient  She does maintain a high-fiber diet  She denies any blood in her stool, weight loss, or other concerns   -Continue high fiber diet   -Discussed colonoscopy for further evaluation, reviewed the risks of bleeding, infection, bowel perforation, and missed polyp  -GoLytely bowel prep instructions provided to patient    Follow up as needed after colonoscopy     ______________________________________________________________________    History of Present Illness  Semaj Glass is a 79 y o  female here for consultation of colorectal cancer screening  The patient's last colonoscopy was in 2017 revealing diverticulosis  Repeat was recommended 5 years later  The patient states that she has been having some intermittent fecal urgency with incontinence  This is worse during times of stress  She states that prior to going out of the house she attempted to have a bowel movement and this has helped improved her symptoms  It occurs approximately once every few months  It is very bothersome to the patient  She does maintain a high-fiber diet  She denies any blood in her stool, weight loss, or other concerns  Review of Systems   Constitutional: Negative for activity change, appetite change, chills, fatigue, fever and unexpected weight change     Gastrointestinal: Negative for abdominal distention, abdominal pain, anal bleeding, blood in stool, constipation, diarrhea, nausea, rectal pain and vomiting  Musculoskeletal: Negative for gait problem  Psychiatric/Behavioral: Negative for confusion  Past Medical History  Past Medical History:   Diagnosis Date   • Asthma    • Back pain    • Bowel incontinence    • Chronic pain    • History of breast implant    • Psychiatric disorder     panic attacks       Past Social history  Past Surgical History:   Procedure Laterality Date   • AUGMENTATION MAMMAPLASTY     • BREAST SURGERY      lift   • [place] COLONOSCOPY FLX DX W/COLLJ SPEC WHEN PFRMD N/A 5/11/2017    Procedure: COLONOSCOPY;  Surgeon: Stu Abarca MD;  Location: [facility]; Service: Gastroenterology     Social History     Socioeconomic History   • Marital status: Single     Spouse name: Not on file   • Number of children: Not on file   • Years of education: Not on file   • Highest education level: Not on file   Occupational History   • Not on file   Tobacco Use   • Smoking status: Never   • Smokeless tobacco: Never   Vaping Use   • Vaping Use: Never used   Substance and Sexual Activity   • Alcohol use: No   • Drug use: No   • Sexual activity: Not Currently   Other Topics Concern   • Not on file   Social History Narrative    Financial status inadequate or marginal      Social Determinants of Health     Financial Resource Strain: Low Risk    • Difficulty of Paying Living Expenses: Not very hard   Food Insecurity: No Food Insecurity   • Worried About Running Out of Food in the Last Year: Never true   • Ran Out of Food in the Last Year: Never true   Transportation Needs: No Transportation Needs   • Lack of Transportation (Medical): No   • Lack of Transportation (Non-Medical):  No   Physical Activity: Not on file   Stress: Not on file   Social Connections: Not on file   Intimate Partner Violence: Not on file   Housing Stability: Not on file     Social History     Substance and Sexual Activity   Alcohol Use No     Social History     Substance and Sexual Activity   Drug Use No     Social History     Tobacco Use   Smoking Status Never   Smokeless Tobacco Never       Past Family History  Family History   Problem Relation Age of Onset   • Lung cancer Mother    • Asthma Sister        Current Medications  Current Outpatient Medications   Medication Sig Dispense Refill   • albuterol (Ventolin HFA) 90 mcg/act inhaler Inhale 2 puffs every 6 (six) hours as needed for wheezing 18 g 1   • atorvastatin (LIPITOR) 40 mg tablet Take 1 tablet (40 mg total) by mouth daily 90 tablet 2   • Blood Pressure Monitoring (Blood Pressure Cuff) MISC Use daily Arm cuff only (Patient not taking: Reported on 2/20/2023) 14 each 0   • calcium polycarbophil (FIBERCON) 625 mg tablet Take 1 tablet (625 mg total) by mouth daily 30 tablet 0   • metFORMIN (GLUCOPHAGE-XR) 500 mg 24 hr tablet Take 1 tablet (500 mg total) by mouth daily with breakfast 180 tablet 2   • naproxen (NAPROSYN) 500 mg tablet Take 1 tablet (500 mg total) by mouth 2 (two) times a day with meals (Patient not taking: Reported on 11/16/2022) 30 tablet 0     No current facility-administered medications for this visit  Allergies  No Known Allergies      The following portions of the patient's history were reviewed and updated as appropriate: allergies, current medications, past medical history, past social history, past surgical history and problem list       Vitals  Vitals:    02/20/23 1232   BP: 130/84   BP Location: Right arm   Patient Position: Sitting   Cuff Size: Large   Pulse: 80   Temp: 98 6 °F (37 °C)   TempSrc: Tympanic   Weight: 63 kg (139 lb)   Height: 5' 4" (1 626 m)         Physical Exam  Constitutional   General appearance: Patient is seated and in no acute distress, well appearing and well nourished  Head and Face   Head and face: Normal     Eyes   Conjunctiva and lids: No erythema, swelling or discharge  Anicteric    Ears, Nose, Mouth, and Throat   Hearing: Normal     Neck: Supple, trachea midline  Pulmonary   Respiratory effort: No increased work of breathing or signs of respiratory distress  Lungs: Clear to ascultation, no wheezes, rhonchi, or rales  Cardiovascular   Heart: Regular rate and rhythm, no murmurs gallops or rubs   Examination of extremities for edema and/or varicosities: Normal     Musculoskeletal   Gait and station: Normal   Skin   Skin and subcutaneous tissue: Warm, dry, and intact  No visible jaundice, lesions or rashes  Psychiatric   Judgment and insight: Normal  Recent and remote memory:  Normal  Mood and affect: Normal      Results  No visits with results within 1 Day(s) from this visit  Latest known visit with results is:   Lab on 04/29/2022   Component Date Value   • Cholesterol 04/29/2022 230 (H)    • Triglycerides 04/29/2022 70    • HDL, Direct 04/29/2022 59    • LDL Calculated 04/29/2022 157 (H)    • Hemoglobin A1C 04/29/2022 6 0 (H)    • EAG 04/29/2022 126        Radiology Results  XR chest 2 views    Result Date: 2/15/2023  Narrative: CHEST INDICATION:   asthma  COMPARISON:  8/8/2021 EXAM PERFORMED/VIEWS:  XR CHEST PA & LATERAL Images: 2 FINDINGS: Cardiomediastinal silhouette appears unremarkable  The lungs are clear  No pneumothorax or pleural effusion  Osseous structures appear within normal limits for patient age  Impression: No acute cardiopulmonary disease  Workstation performed: [id number]       Orders  No orders of the defined types were placed in this encounter

## 2023-02-20 NOTE — PATIENT INSTRUCTIONS
Scheduled date of colonoscopy (as of today):05/04/2023  Physician performing colonoscopy:Eliezer  Location of colonoscopy: [facility]  Bowel prep reviewed with patient: Golytely/Dulocolax  Instructions reviewed with patient by:Stephanie  Clearances:  N/A

## 2023-02-22 LAB
EST. AVERAGE GLUCOSE BLD GHB EST-MCNC: 134 MG/DL
HBA1C MFR BLD: 6.3 %

## 2023-03-01 ENCOUNTER — TELEPHONE (OUTPATIENT)
Dept: INTERNAL MEDICINE CLINIC | Facility: CLINIC | Age: 68
End: 2023-03-01

## 2023-03-01 NOTE — TELEPHONE ENCOUNTER
Patient called to get lab results done 2/20/23   Patient was advised labs were not yet by PCP     Please have PCP read the labs done 2/20/23 and call the patient with the results      thanks

## 2023-03-02 NOTE — TELEPHONE ENCOUNTER
Labs obtained were lipid panel and A1c  Her cholesterol is still higher than ideal, her LDL is 116 and her A1c has increased from 6 0 to 6 3  I would advise her to continue taking her lipitor and metformin and to schedule an appt in the next 3 months for us to address her cholesterol and diabetes  Please advise   Thank you

## 2023-03-07 NOTE — TELEPHONE ENCOUNTER
Called patient, patient made aware of results as per note   Patient verbalized understanding and stated she will give our office a call back to schedule a 3 month f/u

## 2023-05-03 ENCOUNTER — TELEPHONE (OUTPATIENT)
Dept: GASTROENTEROLOGY | Facility: HOSPITAL | Age: 68
End: 2023-05-03

## 2023-05-04 ENCOUNTER — HOSPITAL ENCOUNTER (OUTPATIENT)
Dept: GASTROENTEROLOGY | Facility: HOSPITAL | Age: 68
Setting detail: OUTPATIENT SURGERY
End: 2023-05-04
Attending: INTERNAL MEDICINE

## 2023-05-04 ENCOUNTER — ANESTHESIA (OUTPATIENT)
Dept: GASTROENTEROLOGY | Facility: HOSPITAL | Age: 68
End: 2023-05-04

## 2023-05-04 ENCOUNTER — ANESTHESIA EVENT (OUTPATIENT)
Dept: GASTROENTEROLOGY | Facility: HOSPITAL | Age: 68
End: 2023-05-04

## 2023-05-04 VITALS
OXYGEN SATURATION: 99 % | SYSTOLIC BLOOD PRESSURE: 155 MMHG | RESPIRATION RATE: 16 BRPM | DIASTOLIC BLOOD PRESSURE: 85 MMHG | WEIGHT: 139 LBS | HEART RATE: 65 BPM | HEIGHT: 64 IN | BODY MASS INDEX: 23.73 KG/M2 | TEMPERATURE: 96.1 F

## 2023-05-04 DIAGNOSIS — Z12.12 SCREENING FOR COLORECTAL CANCER: ICD-10-CM

## 2023-05-04 DIAGNOSIS — Z12.11 SCREENING FOR COLORECTAL CANCER: ICD-10-CM

## 2023-05-04 LAB — GLUCOSE SERPL-MCNC: 116 MG/DL (ref 65–140)

## 2023-05-04 RX ORDER — SODIUM CHLORIDE 9 MG/ML
INJECTION, SOLUTION INTRAVENOUS CONTINUOUS PRN
Status: DISCONTINUED | OUTPATIENT
Start: 2023-05-04 | End: 2023-05-04

## 2023-05-04 RX ORDER — PROPOFOL 10 MG/ML
INJECTION, EMULSION INTRAVENOUS AS NEEDED
Status: DISCONTINUED | OUTPATIENT
Start: 2023-05-04 | End: 2023-05-04

## 2023-05-04 RX ADMIN — PROPOFOL 30 MG: 10 INJECTION, EMULSION INTRAVENOUS at 09:59

## 2023-05-04 RX ADMIN — PROPOFOL 20 MG: 10 INJECTION, EMULSION INTRAVENOUS at 10:04

## 2023-05-04 RX ADMIN — PROPOFOL 20 MG: 10 INJECTION, EMULSION INTRAVENOUS at 10:24

## 2023-05-04 RX ADMIN — PROPOFOL 20 MG: 10 INJECTION, EMULSION INTRAVENOUS at 10:30

## 2023-05-04 RX ADMIN — PROPOFOL 20 MG: 10 INJECTION, EMULSION INTRAVENOUS at 10:01

## 2023-05-04 RX ADMIN — PROPOFOL 100 MG: 10 INJECTION, EMULSION INTRAVENOUS at 09:58

## 2023-05-04 RX ADMIN — PROPOFOL 20 MG: 10 INJECTION, EMULSION INTRAVENOUS at 10:33

## 2023-05-04 RX ADMIN — PROPOFOL 30 MG: 10 INJECTION, EMULSION INTRAVENOUS at 10:07

## 2023-05-04 RX ADMIN — SODIUM CHLORIDE: 0.9 INJECTION, SOLUTION INTRAVENOUS at 09:40

## 2023-05-04 RX ADMIN — PROPOFOL 20 MG: 10 INJECTION, EMULSION INTRAVENOUS at 10:11

## 2023-05-04 RX ADMIN — PROPOFOL 20 MG: 10 INJECTION, EMULSION INTRAVENOUS at 10:18

## 2023-05-04 NOTE — H&P
"History and Physical - [facility]  Deepali Hughes 79 y o  female MRN: [id number]    HPI: Deepali Hughes is a 79y o  year old female w hx of diverticulosis, fecal incontinence, prior colonoscopy 2017 who presents for repeat colonoscopy  Last Hg   Lab Results   Component Value Date    HGB 13 8 08/08/2021     Last INR   Lab Results   Component Value Date    INR 1 03 02/22/2014       Review of Systems    Historical Information   Past Medical History:   Diagnosis Date    Asthma     Back pain     Bowel incontinence     Chronic pain     Diabetes mellitus ([address] )     History of breast implant     Psychiatric disorder     panic attacks     Past Surgical History:   Procedure Laterality Date    AUGMENTATION MAMMAPLASTY      patient denies    BREAST SURGERY      lift    [place] COLONOSCOPY FLX DX W/COLLJ SPEC WHEN PFRMD N/A 05/11/2017    Procedure: COLONOSCOPY;  Surgeon: Ap Esquivel MD;  Location: [facility];   Service: Gastroenterology     Social History   Social History     Substance and Sexual Activity   Alcohol Use No     Social History     Substance and Sexual Activity   Drug Use No     Social History     Tobacco Use   Smoking Status Never   Smokeless Tobacco Never     Family History   Problem Relation Age of Onset    Lung cancer Mother     Asthma Sister        Meds/Allergies     (Not in a hospital admission)      No Known Allergies    Objective     /80   Pulse 80   Temp 97 7 °F (36 5 °C)   Resp 16   Ht 5' 4\" (1 626 m)   Wt 63 kg (139 lb)   SpO2 96%   BMI 23 86 kg/m²     PHYSICAL EXAM    Gen: NAD  CV: RRR  CHEST: Clear  ABD: soft, NT/ND  EXT: no edema  Neuro: AAO    ASSESSMENT/PLAN:  This is a 79y o  year old female here for colonoscopy    Plan/Procedure: colonoscopy      "

## 2023-05-04 NOTE — ANESTHESIA PREPROCEDURE EVALUATION
Procedure:  COLONOSCOPY    Relevant Problems   CARDIO   (+) Hyperlipidemia      ENDO   (+) Diabetes mellitus type 2 in nonobese (HCC)      NEURO/PSYCH   (+) Depression   (+) Panic disorder   (+) Situational depression      PULMONARY   (+) Asthma        Physical Exam    Airway    Mallampati score: III  TM Distance: >3 FB  Neck ROM: full     Dental   No notable dental hx     Cardiovascular  Cardiovascular exam normal    Pulmonary  Pulmonary exam normal     Other Findings        Anesthesia Plan  ASA Score- 2     Anesthesia Type- IV sedation with anesthesia with ASA Monitors  Additional Monitors:   Airway Plan:           Plan Factors-Exercise tolerance (METS): >4 METS  Chart reviewed  Patient summary reviewed  Patient is not a current smoker  Induction- intravenous  Postoperative Plan-     Informed Consent- Anesthetic plan and risks discussed with patient

## 2023-05-04 NOTE — ANESTHESIA POSTPROCEDURE EVALUATION
Post-Op Assessment Note    CV Status:  Stable    Pain management: adequate     Mental Status:  Alert and awake   Hydration Status:  Euvolemic   PONV Controlled:  Controlled   Airway Patency:  Patent      Post Op Vitals Reviewed: Yes      Staff: Anesthesiologist, CRNA         There were no known notable events for this encounter    Please see nurse's notes for VS  BP      Temp     Pulse     Resp      SpO2

## 2023-05-12 ENCOUNTER — TELEPHONE (OUTPATIENT)
Dept: GASTROENTEROLOGY | Facility: CLINIC | Age: 68
End: 2023-05-12

## 2023-05-12 NOTE — TELEPHONE ENCOUNTER
Left message in Hungarian with full details and if they have any questions to feel free to call me back  Recall order has been entered

## 2023-05-12 NOTE — TELEPHONE ENCOUNTER
----- Message from Calderon Briggs MD sent at 5/12/2023  2:52 PM EDT -----  My medical assistant will call her with her results  Polyp was an adenoma so repeat colonoscopy in 7 years

## 2023-05-12 NOTE — RESULT ENCOUNTER NOTE
My medical assistant will call her with her results  Polyp was an adenoma so repeat colonoscopy in 7 years

## 2023-08-04 ENCOUNTER — TELEPHONE (OUTPATIENT)
Dept: INTERNAL MEDICINE CLINIC | Facility: CLINIC | Age: 68
End: 2023-08-04

## 2023-12-23 ENCOUNTER — HOSPITAL ENCOUNTER (EMERGENCY)
Facility: HOSPITAL | Age: 68
Discharge: HOME | End: 2023-12-23
Attending: EMERGENCY MEDICINE
Payer: COMMERCIAL

## 2023-12-23 ENCOUNTER — APPOINTMENT (EMERGENCY)
Dept: RADIOLOGY | Facility: HOSPITAL | Age: 68
End: 2023-12-23
Payer: COMMERCIAL

## 2023-12-23 VITALS
OXYGEN SATURATION: 99 % | SYSTOLIC BLOOD PRESSURE: 148 MMHG | RESPIRATION RATE: 22 BRPM | HEART RATE: 96 BPM | WEIGHT: 140 LBS | DIASTOLIC BLOOD PRESSURE: 68 MMHG | TEMPERATURE: 97.9 F | BODY MASS INDEX: 24.03 KG/M2

## 2023-12-23 DIAGNOSIS — J45.901 ASTHMA EXACERBATION: Primary | ICD-10-CM

## 2023-12-23 LAB
FLUAV RNA RESP QL NAA+PROBE: POSITIVE
FLUBV RNA RESP QL NAA+PROBE: NEGATIVE
RSV RNA RESP QL NAA+PROBE: NEGATIVE
SARS-COV-2 RNA RESP QL NAA+PROBE: NEGATIVE

## 2023-12-23 PROCEDURE — 96374 THER/PROPH/DIAG INJ IV PUSH: CPT

## 2023-12-23 PROCEDURE — 94644 CONT INHLJ TX 1ST HOUR: CPT

## 2023-12-23 PROCEDURE — 71046 X-RAY EXAM CHEST 2 VIEWS: CPT

## 2023-12-23 PROCEDURE — 99285 EMERGENCY DEPT VISIT HI MDM: CPT | Performed by: EMERGENCY MEDICINE

## 2023-12-23 PROCEDURE — 0241U HB NFCT DS VIR RESP RNA 4 TRGT: CPT

## 2023-12-23 PROCEDURE — 99284 EMERGENCY DEPT VISIT MOD MDM: CPT

## 2023-12-23 RX ORDER — METHYLPREDNISOLONE SODIUM SUCCINATE 125 MG/2ML
125 INJECTION, POWDER, LYOPHILIZED, FOR SOLUTION INTRAMUSCULAR; INTRAVENOUS ONCE
Status: COMPLETED | OUTPATIENT
Start: 2023-12-23 | End: 2023-12-23

## 2023-12-23 RX ORDER — PREDNISONE 20 MG/1
40 TABLET ORAL DAILY
Qty: 10 TABLET | Refills: 0 | Status: SHIPPED | OUTPATIENT
Start: 2023-12-23 | End: 2023-12-28

## 2023-12-23 RX ORDER — SODIUM CHLORIDE FOR INHALATION 0.9 %
12 VIAL, NEBULIZER (ML) INHALATION ONCE
Status: COMPLETED | OUTPATIENT
Start: 2023-12-23 | End: 2023-12-23

## 2023-12-23 RX ADMIN — METHYLPREDNISOLONE SODIUM SUCCINATE 125 MG: 125 INJECTION, POWDER, FOR SOLUTION INTRAMUSCULAR; INTRAVENOUS at 18:57

## 2023-12-23 RX ADMIN — Medication 12 ML: at 19:56

## 2023-12-23 RX ADMIN — ALBUTEROL SULFATE 10 MG: 2.5 SOLUTION RESPIRATORY (INHALATION) at 19:55

## 2023-12-23 RX ADMIN — IPRATROPIUM BROMIDE 1 MG: 0.5 SOLUTION RESPIRATORY (INHALATION) at 19:56

## 2023-12-23 NOTE — ED PROVIDER NOTES
History  Chief Complaint   Patient presents with    Asthma     Pt reports asthma attack that started about 2 hours PTA. Attempted to use rescue inhaler with no symptom relief.     HPI  ED Course as of 12/30/23 0014   Sat Dec 23, 2023   1842 Patient is a 68 y.o. female with PMHx asthma who presents to the ED for evaluation of wheezing, shortness of breath, and cough that began last night. Reports used her rescue inhaler with improvement initially but came to the ED since it stopped working. Denies any fevers, chills, chest pain, abdominal pain, n/v/d, recent hospitalization, known sick contacts, recent antibiotic use, or any other complaints or concerns at this time.   1849 ASSESSMENT: Patient is a 68 y.o. female who presents with SOB, wheezing, cough yesterday.   DDX includes but not limited to: asthma exacerbation, pneumonia, pneumothorax.   PLAN: CXR. Treated with solumedrol, BURROWS neb.   1956 INFLU A PCR(!): Positive         Prior to Admission Medications   Prescriptions Last Dose Informant Patient Reported? Taking?   Blood Pressure Monitoring (Blood Pressure Cuff) MISC  Self No No   Sig: Use daily Arm cuff only   Patient not taking: Reported on 2/20/2023   albuterol (Ventolin HFA) 90 mcg/act inhaler  Self No No   Sig: Inhale 2 puffs every 6 (six) hours as needed for wheezing   bisacodyl (DULCOLAX) 5 mg EC tablet   No No   Sig: Take 1 tablet (5 mg total) by mouth once for 1 dose   calcium polycarbophil (FIBERCON) 625 mg tablet   No No   Sig: Take 1 tablet (625 mg total) by mouth daily   naproxen (NAPROSYN) 500 mg tablet  Self No No   Sig: Take 1 tablet (500 mg total) by mouth 2 (two) times a day with meals   Patient not taking: Reported on 11/16/2022      Facility-Administered Medications: None       Past Medical History:   Diagnosis Date    Asthma     Back pain     Bowel incontinence     Chronic pain     Diabetes mellitus (HCC)     History of breast implant     Psychiatric disorder     panic attacks       Past  Surgical History:   Procedure Laterality Date    AUGMENTATION MAMMAPLASTY      patient denies    BREAST SURGERY      lift    OR COLONOSCOPY FLX DX W/COLLJ SPEC WHEN PFRMD N/A 05/11/2017    Procedure: COLONOSCOPY;  Surgeon: Dov Martins MD;  Location: [facility];  Service: Gastroenterology       Family History   Problem Relation Age of Onset    Lung cancer Mother     Asthma Sister      I have reviewed and agree with the history as documented.    E-Cigarette/Vaping    E-Cigarette Use Never User      E-Cigarette/Vaping Substances    Nicotine No     THC No     CBD No     Flavoring No     Other No     Unknown No      Social History     Tobacco Use    Smoking status: Never    Smokeless tobacco: Never   Vaping Use    Vaping status: Never Used   Substance Use Topics    Alcohol use: No    Drug use: No        Review of Systems   Respiratory:  Positive for cough, shortness of breath and wheezing.        Physical Exam  ED Triage Vitals [12/23/23 1816]   Temperature Pulse Respirations Blood Pressure SpO2   97.9 °F (36.6 °C) (!) 113 (!) 24 159/99 95 %      Temp Source Heart Rate Source Patient Position - Orthostatic VS BP Location FiO2 (%)   Oral Monitor Sitting Left arm --      Pain Score       7             Orthostatic Vital Signs  Vitals:    12/23/23 1816 12/23/23 2000   BP: 159/99 148/68   Pulse: (!) 113 96   Patient Position - Orthostatic VS: Sitting Sitting       Physical Exam  Vitals and nursing note reviewed.   Constitutional:       General: She is not in acute distress.  HENT:      Head: Normocephalic and atraumatic.      Mouth/Throat:      Mouth: Mucous membranes are moist.   Eyes:      General: No scleral icterus.     Conjunctiva/sclera: Conjunctivae normal.   Cardiovascular:      Rate and Rhythm: Normal rate and regular rhythm.      Heart sounds: Normal heart sounds.   Pulmonary:      Effort: Pulmonary effort is normal. No respiratory distress.      Breath sounds: Wheezing present.   Abdominal:      Palpations:  Abdomen is soft.      Tenderness: There is no abdominal tenderness. There is no guarding or rebound.   Musculoskeletal:         General: No deformity. Normal range of motion.      Cervical back: Normal range of motion and neck supple.   Skin:     General: Skin is warm.      Capillary Refill: Capillary refill takes less than 2 seconds.      Findings: No rash.   Neurological:      Mental Status: She is alert. Mental status is at baseline.   Psychiatric:         Mood and Affect: Mood normal.         Behavior: Behavior normal.         ED Medications  Medications   albuterol inhalation solution 10 mg (10 mg Nebulization Given 12/1955)   ipratropium (ATROVENT) 0.02 % inhalation solution 1 mg (1 mg Nebulization Given 12/23/23 1956)   sodium chloride 0.9 % inhalation solution 12 mL (12 mL Nebulization Given 12/23/23 1956)   methylPREDNISolone sodium succinate (Solu-MEDROL) injection 125 mg (125 mg Intravenous Given 12/23/23 1857)       Diagnostic Studies  Results Reviewed       Procedure Component Value Units Date/Time    FLU/RSV/COVID - if FLU/RSV clinically relevant [[id number]]  (Abnormal) Collected: 12/23/23 1903    Lab Status: Final result Specimen: Nares from Nose Updated: 12/23/23 1949     SARS-CoV-2 Negative     INFLUENZA A PCR Positive     INFLUENZA B PCR Negative     RSV PCR Negative    Narrative:      FOR PEDIATRIC PATIENTS - copy/paste COVID Guidelines URL to browser: [email or web address]    SARS-CoV-2 assay is a Nucleic Acid Amplification assay intended for the  qualitative detection of nucleic acid from SARS-CoV-2 in nasopharyngeal  swabs. Results are for the presumptive identification of SARS-CoV-2 RNA.    Positive results are indicative of infection with SARS-CoV-2, the virus  causing COVID-19, but do not rule out bacterial infection or co-infection  with other viruses. Laboratories within the [place] and its  territories are required to report  all positive results to the appropriate  public health authorities. Negative results do not preclude SARS-CoV-2  infection and should not be used as the sole basis for treatment or other  patient management decisions. Negative results must be combined with  clinical observations, patient history, and epidemiological information.  This test has not been FDA cleared or approved.    This test has been authorized by FDA under an Emergency Use Authorization  (EUA). This test is only authorized for the duration of time the  declaration that circumstances exist justifying the authorization of the  emergency use of an in vitro diagnostic tests for detection of SARS-CoV-2  virus and/or diagnosis of COVID-19 infection under section 564(b)(1) of  the Act, 21 U.S.C. 360bbb-3(b)(1), unless the authorization is terminated  or revoked sooner. The test has been validated but independent review by FDA  and CLIA is pending.    Test performed using [employer] GeneXpert: This RT-PCR assay targets N2,  a region unique to SARS-CoV-2. A conserved region in the E-gene was chosen  for pan-Sarbecovirus detection which includes SARS-CoV-2.    According to CMS-2020-01-R, this platform meets the definition of high-throughput technology.                   XR chest 2 views   Final Result by Alen Aguirre MD (12/24 1029)      No acute cardiopulmonary disease.                  Workstation performed: [id number]               Procedures  Procedures      ED Course  ED Course as of 12/30/23 0014   Sat Dec 23, 2023   1842 Patient is a 68 y.o. female with PMHx asthma who presents to the ED for evaluation of wheezing, shortness of breath, and cough that began last night. Reports used her rescue inhaler with improvement initially but came to the ED since it stopped working. Denies any fevers, chills, chest pain, abdominal pain, n/v/d, recent hospitalization, known sick contacts, recent antibiotic use, or any other complaints or concerns at this time.   1849  ASSESSMENT: Patient is a 68 y.o. female who presents with SOB, wheezing, cough yesterday.   DDX includes but not limited to: asthma exacerbation, pneumonia, pneumothorax.   PLAN: CXR. Treated with solumedrol, BURROWS neb.   1956 INFLU A PCR(!): Positive               Identification of Seniors at Risk      Flowsheet Row Most Recent Value   (ISAR) Identification of Seniors at Risk    Before the illness or injury that brought you to the Emergency, did you need someone to help you on a regular basis? 0 Filed at: 12/23/2023 1819   In the last 24 hours, have you needed more help than usual? 0 Filed at: 12/23/2023 1819   Have you been hospitalized for one or more nights during the past 6 months? 0 Filed at: 12/23/2023 1819   In general, do you see well? 0 Filed at: 12/23/2023 1819   In general, do you have serious problems with your memory? 0 Filed at: 12/23/2023 1819   Do you take more than three different medications every day? 0 Filed at: 12/23/2023 1819   ISAR Score 0 Filed at: 12/23/2023 1819                      SBIRT 20yo+      Flowsheet Row Most Recent Value   Initial Alcohol Screen: US AUDIT-C     1. How often do you have a drink containing alcohol? 0 Filed at: 12/23/2023 1856   2. How many drinks containing alcohol do you have on a typical day you are drinking?  0 Filed at: 12/23/2023 1856   3a. Male UNDER 65: How often do you have five or more drinks on one occasion? 0 Filed at: 12/23/2023 1856   3b. FEMALE Any Age, or MALE 65+: How often do you have 4 or more drinks on one occassion? 0 Filed at: 12/23/2023 1856   Audit-C Score 0 Filed at: 12/23/2023 1856   DORY: How many times in the past year have you...    Used an illegal drug or used a prescription medication for non-medical reasons? Never Filed at: 12/23/2023 1856                  Medical Decision Making  Problems Addressed:  Asthma exacerbation: acute illness or injury    Amount and/or Complexity of Data Reviewed  Labs:  Decision-making details documented in  ED Course.  Radiology: ordered.    Risk  Prescription drug management.        Patient reevaluated, reports improvement in symptoms. Repeat physical exam shows improvement in work of breathing, lungs CTA bilaterally. Denies any new or worsening complaints or concerns at this time. Will treat asthma exacerbation likely 2/2 influenza with course of steroids, albuterol use. Discussed results and plan with patient. Advised on need for outpatient follow up, given information. Given return precautions verbally and in discharge instructions, confirmed with teach back method. All questions answered prior to discharge. Patient expressed verbal understanding and is agreeable with plan for discharge with outpatient follow up.    Disposition  Final diagnoses:   Asthma exacerbation     Time reflects when diagnosis was documented in both MDM as applicable and the Disposition within this note       Time User Action Codes Description Comment    12/23/2023  9:37 PM Karen Saucedo [J45.901] Asthma exacerbation           ED Disposition       ED Disposition   Discharge    Condition   Stable    Date/Time   Sat Dec 23, 2023 2137    Comment   Hue Short discharge to home/self care.                   Follow-up Information       Follow up With Specialties Details Why Contact Info Additional Information    [facility] Pulmonary [facility] Pulmonology Call   [address] [facility] [phone] [facility], [address] [place] [address], [phone]   [phone]    [facility] Emergency Department Emergency Medicine Go to  If symptoms worsen [address] [facility] [id number] [phone] [facility] Emergency Department, [address] [place], [place], [id number] [phone]            Discharge Medication List as of 12/23/2023  9:40 PM        START taking these medications    Details    predniSONE 20 mg tablet Take 2 tablets (40 mg total) by mouth daily for 5 days, Starting Sat 12/23/2023, Until Thu 12/28/2023, Normal           CONTINUE these medications which have NOT CHANGED    Details   albuterol (Ventolin HFA) 90 mcg/act inhaler Inhale 2 puffs every 6 (six) hours as needed for wheezing, Starting Fri 11/13/2020, Normal      bisacodyl (DULCOLAX) 5 mg EC tablet Take 1 tablet (5 mg total) by mouth once for 1 dose, Starting Mon 2/20/2023, Normal      Blood Pressure Monitoring (Blood Pressure Cuff) MISC Use daily Arm cuff only, Starting Fri 11/5/2021, Normal      calcium polycarbophil (FIBERCON) 625 mg tablet Take 1 tablet (625 mg total) by mouth daily, Starting Tue 9/20/2022, Until Thu 10/20/2022, Normal      naproxen (NAPROSYN) 500 mg tablet Take 1 tablet (500 mg total) by mouth 2 (two) times a day with meals, Starting Sat 6/26/2021, Normal               PDMP Review       None             ED Provider  Attending physically available and evaluated Hue Short. I managed the patient along with the ED Attending.    Electronically Signed by           Karen Babcock,   12/30/23 0017

## 2023-12-24 NOTE — DISCHARGE INSTRUCTIONS
You are positive for INFLUENZA A.  You have been prescribed prednisone, take 40mg for the next 5 days.  Please follow up with pulmonology and your PCP.  Continue to monitor symptoms and breathing at home.  Please return to the Emergency Department if you experience worsening of your current symptoms or any new/other concerning symptoms.

## 2023-12-24 NOTE — ED ATTENDING ATTESTATION
12/23/2023  I, Martina Justice MD, saw and evaluated the patient. I have discussed the patient with the resident/non-physician practitioner and agree with the resident's/non-physician practitioner's findings, Plan of Care, and MDM as documented in the resident's/non-physician practitioner's note, except where noted. All available labs and Radiology studies were reviewed.  I was present for key portions of any procedure(s) performed by the resident/non-physician practitioner and I was immediately available to provide assistance.       At this point I agree with the current assessment done in the Emergency Department.  I have conducted an independent evaluation of this patient a history and physical is as follows:    68-year-old female with past medical history of asthma who presents for evaluation of shortness of breath.  Symptoms started yesterday.  She was using her albuterol inhaler with some improvement but was no longer getting improvement with inhalers today.  She states this feels like typical asthma exacerbation.  She does have some chest tightness.  She has had a cough. Denies fevers or chills. Denies chest pain.  Denies abdominal pain, nausea, vomiting, or diarrhea.  Denies leg pain or leg swelling.    Physical exam:  Vital signs reviewed, patient is mildly tachypneic and tachycardic.  Patient has mild increased work of breathing, head normocephalic, atraumatic, mucous membranes moist, neck supple, heart tachycardic rate, regular rhythm, lungs with diffuse wheezing, mildly increased work of breathing, abdomen soft, nontender, nondistended, no peripheral edema, no skin rashes, no focal neurologic deficits.    Assessment/plan:  68-year-old female with past medical history of asthma who presents for evaluation of shortness of breath and chest tightness for two days.  Patient does have diffuse wheezing throughout her lungs, she has mildly increased work of breathing.  Likely asthma exacerbation in the setting  of viral syndrome.  Will obtain chest x-ray to evaluate for pneumothorax or pneumonia.  Will treat with heart neb, steroids, and reassess.    ED Course     Reviewed and interpreted x-ray, no cardiopulmonary disease.  Viral swab positive for influenza A.  Reassessed patient, breathing is significantly improved.  Will discharge on course of steroids.  Patient has albuterol treatment at home.  Will have patient follow-up with PCP.  Strict return precautions discussed.    Critical Care Time  CriticalCare Time    Date/Time: 12/23/2023 7:26 PM    Performed by: Martina Justice MD  Authorized by: Martina Justice MD    Critical care provider statement:     Critical care time (minutes):  32    Critical care start time:  12/23/2023 6:26 PM    Critical care end time:  12/23/2023 7:26 PM    Critical care time was exclusive of:  Separately billable procedures and treating other patients and teaching time    Critical care was necessary to treat or prevent imminent or life-threatening deterioration of the following conditions:  Respiratory failure    Critical care was time spent personally by me on the following activities:  Obtaining history from patient or surrogate, development of treatment plan with patient or surrogate, discussions with primary provider, evaluation of patient's response to treatment, examination of patient, ordering and performing treatments and interventions, ordering and review of laboratory studies, ordering and review of radiographic studies, re-evaluation of patient's condition and review of old charts    I assumed direction of critical care for this patient from another provider in my specialty: no

## 2024-05-12 ENCOUNTER — HOSPITAL ENCOUNTER (EMERGENCY)
Facility: HOSPITAL | Age: 69
Discharge: HOME | End: 2024-05-12
Attending: EMERGENCY MEDICINE | Admitting: EMERGENCY MEDICINE

## 2024-05-12 VITALS
DIASTOLIC BLOOD PRESSURE: 94 MMHG | SYSTOLIC BLOOD PRESSURE: 157 MMHG | OXYGEN SATURATION: 98 % | HEART RATE: 93 BPM | RESPIRATION RATE: 18 BRPM | TEMPERATURE: 98.1 F

## 2024-05-12 DIAGNOSIS — M79.10 MYALGIA: Primary | ICD-10-CM

## 2024-05-12 LAB
ALBUMIN SERPL BCP-MCNC: 4.4 G/DL (ref 3.5–5)
ALP SERPL-CCNC: 84 U/L (ref 34–104)
ALT SERPL W P-5'-P-CCNC: 13 U/L (ref 7–52)
ANION GAP SERPL CALCULATED.3IONS-SCNC: 7 MMOL/L (ref 4–13)
AST SERPL W P-5'-P-CCNC: 19 U/L (ref 13–39)
BASOPHILS # BLD AUTO: 0.03 THOUSANDS/ÂΜL (ref 0–0.1)
BASOPHILS NFR BLD AUTO: 0 % (ref 0–1)
BILIRUB SERPL-MCNC: 0.52 MG/DL (ref 0.2–1)
BUN SERPL-MCNC: 20 MG/DL (ref 5–25)
CALCIUM SERPL-MCNC: 9.3 MG/DL (ref 8.4–10.2)
CHLORIDE SERPL-SCNC: 104 MMOL/L (ref 96–108)
CK SERPL-CCNC: 54 U/L (ref 26–192)
CO2 SERPL-SCNC: 25 MMOL/L (ref 21–32)
CREAT SERPL-MCNC: 0.73 MG/DL (ref 0.6–1.3)
CRP SERPL QL: 1.4 MG/L
EOSINOPHIL # BLD AUTO: 0.05 THOUSAND/ÂΜL (ref 0–0.61)
EOSINOPHIL NFR BLD AUTO: 1 % (ref 0–6)
ERYTHROCYTE [DISTWIDTH] IN BLOOD BY AUTOMATED COUNT: 13 % (ref 11.6–15.1)
ERYTHROCYTE [SEDIMENTATION RATE] IN BLOOD: 39 MM/HOUR (ref 0–29)
GFR SERPL CREATININE-BSD FRML MDRD: 84 ML/MIN/1.73SQ M
GLUCOSE SERPL-MCNC: 162 MG/DL (ref 65–140)
HCT VFR BLD AUTO: 47.2 % (ref 34.8–46.1)
HGB BLD-MCNC: 15.1 G/DL (ref 11.5–15.4)
IMM GRANULOCYTES # BLD AUTO: 0.02 THOUSAND/UL (ref 0–0.2)
IMM GRANULOCYTES NFR BLD AUTO: 0 % (ref 0–2)
LYMPHOCYTES # BLD AUTO: 1.14 THOUSANDS/ÂΜL (ref 0.6–4.47)
LYMPHOCYTES NFR BLD AUTO: 12 % (ref 14–44)
MCH RBC QN AUTO: 28.4 PG (ref 26.8–34.3)
MCHC RBC AUTO-ENTMCNC: 32 G/DL (ref 31.4–37.4)
MCV RBC AUTO: 89 FL (ref 82–98)
MONOCYTES # BLD AUTO: 0.4 THOUSAND/ÂΜL (ref 0.17–1.22)
MONOCYTES NFR BLD AUTO: 4 % (ref 4–12)
NEUTROPHILS # BLD AUTO: 7.96 THOUSANDS/ÂΜL (ref 1.85–7.62)
NEUTS SEG NFR BLD AUTO: 83 % (ref 43–75)
NRBC BLD AUTO-RTO: 0 /100 WBCS
PLATELET # BLD AUTO: 245 THOUSANDS/UL (ref 149–390)
PMV BLD AUTO: 10.9 FL (ref 8.9–12.7)
POTASSIUM SERPL-SCNC: 4.7 MMOL/L (ref 3.5–5.3)
PROT SERPL-MCNC: 7.8 G/DL (ref 6.4–8.4)
RBC # BLD AUTO: 5.31 MILLION/UL (ref 3.81–5.12)
SODIUM SERPL-SCNC: 136 MMOL/L (ref 135–147)
WBC # BLD AUTO: 9.6 THOUSAND/UL (ref 4.31–10.16)

## 2024-05-12 PROCEDURE — 85025 COMPLETE CBC W/AUTO DIFF WBC: CPT

## 2024-05-12 PROCEDURE — 80053 COMPREHEN METABOLIC PANEL: CPT

## 2024-05-12 PROCEDURE — 96374 THER/PROPH/DIAG INJ IV PUSH: CPT

## 2024-05-12 PROCEDURE — 82550 ASSAY OF CK (CPK): CPT

## 2024-05-12 PROCEDURE — 99283 EMERGENCY DEPT VISIT LOW MDM: CPT

## 2024-05-12 PROCEDURE — 36415 COLL VENOUS BLD VENIPUNCTURE: CPT

## 2024-05-12 PROCEDURE — 85652 RBC SED RATE AUTOMATED: CPT

## 2024-05-12 PROCEDURE — 99284 EMERGENCY DEPT VISIT MOD MDM: CPT | Performed by: EMERGENCY MEDICINE

## 2024-05-12 PROCEDURE — 86140 C-REACTIVE PROTEIN: CPT

## 2024-05-12 RX ORDER — METHOCARBAMOL 500 MG/1
500 TABLET, FILM COATED ORAL 2 TIMES DAILY
Qty: 10 TABLET | Refills: 0 | Status: SHIPPED | OUTPATIENT
Start: 2024-05-12 | End: 2024-05-17

## 2024-05-12 RX ORDER — METHOCARBAMOL 500 MG/1
500 TABLET, FILM COATED ORAL ONCE
Status: COMPLETED | OUTPATIENT
Start: 2024-05-12 | End: 2024-05-12

## 2024-05-12 RX ORDER — ACETAMINOPHEN 325 MG/1
975 TABLET ORAL ONCE
Status: COMPLETED | OUTPATIENT
Start: 2024-05-12 | End: 2024-05-12

## 2024-05-12 RX ORDER — KETOROLAC TROMETHAMINE 30 MG/ML
15 INJECTION, SOLUTION INTRAMUSCULAR; INTRAVENOUS ONCE
Status: COMPLETED | OUTPATIENT
Start: 2024-05-12 | End: 2024-05-12

## 2024-05-12 RX ADMIN — KETOROLAC TROMETHAMINE 15 MG: 30 INJECTION, SOLUTION INTRAMUSCULAR; INTRAVENOUS at 10:54

## 2024-05-12 RX ADMIN — ACETAMINOPHEN 975 MG: 325 TABLET, FILM COATED ORAL at 10:52

## 2024-05-12 RX ADMIN — METHOCARBAMOL 500 MG: 500 TABLET ORAL at 10:53

## 2024-05-12 NOTE — ED PROVIDER NOTES
History  Chief Complaint   Patient presents with    Back Pain     Bilateral mid-back pain that radiates down into legs since Friday.    Headache     Since Friday with nausea     The patient is a 68 y.o. female with a history of asthma, chronic pain who presents to the ED with diffuse myalgias ongoing for 2 days. Patient states pain is focused to the back and thighs but she also has headache which she describes as frontal and dull. Patient states she had similar presentation in the past, presented to the ED, was treated and had not had a recurrence of this until now. No hx of autoimmune conditions. She denies any neck stiffness, no fevers. No rashes. No recent cough, congestion or viral syndrome. 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 She tried tylenol and tramadol without relief.           Prior to Admission Medications   Prescriptions Last Dose Informant Patient Reported? Taking?   Blood Pressure Monitoring (Blood Pressure Cuff) MISC  Self No No   Sig: Use daily Arm cuff only   Patient not taking: Reported on 2/20/2023   albuterol (Ventolin HFA) 90 mcg/act inhaler  Self No No   Sig: Inhale 2 puffs every 6 (six) hours as needed for wheezing   bisacodyl (DULCOLAX) 5 mg EC tablet   No No   Sig: Take 1 tablet (5 mg total) by mouth once for 1 dose   calcium polycarbophil (FIBERCON) 625 mg tablet   No No   Sig: Take 1 tablet (625 mg total) by mouth daily   naproxen (NAPROSYN) 500 mg tablet  Self No No   Sig: Take 1 tablet (500 mg total) by mouth 2 (two) times a day with meals   Patient not taking: Reported on 11/16/2022      Facility-Administered Medications: None       Past Medical History:   Diagnosis Date    Asthma     Back pain     Bowel incontinence     Chronic pain     Diabetes mellitus (HCC)     History of breast implant     Psychiatric disorder     panic attacks       Past Surgical  History:   Procedure Laterality Date    AUGMENTATION MAMMAPLASTY      patient denies    BREAST SURGERY      lift    DC COLONOSCOPY FLX DX W/COLLJ SPEC WHEN PFRMD N/A 05/11/2017    Procedure: COLONOSCOPY;  Surgeon: Dov Martins MD;  Location: [facility];  Service: Gastroenterology       Family History   Problem Relation Age of Onset    Lung cancer Mother     Asthma Sister      I have reviewed and agree with the history as documented.    E-Cigarette/Vaping    E-Cigarette Use Never User      E-Cigarette/Vaping Substances    Nicotine No     THC No     CBD No     Flavoring No     Other No     Unknown No      Social History     Tobacco Use    Smoking status: Never    Smokeless tobacco: Never   Vaping Use    Vaping status: Never Used   Substance Use Topics    Alcohol use: No    Drug use: No        Review of Systems   Constitutional:  Negative for chills and fever.   HENT:  Negative for ear pain and sore throat.    Eyes:  Negative for pain and visual disturbance.   Respiratory:  Negative for cough and shortness of breath.    Cardiovascular:  Negative for chest pain and palpitations.   Gastrointestinal:  Negative for abdominal pain and vomiting.   Genitourinary:  Negative for dysuria and hematuria.   Musculoskeletal:  Positive for myalgias. Negative for arthralgias and back pain.   Skin:  Negative for color change and rash.   Neurological:  Positive for headaches. Negative for seizures and syncope.   All other systems reviewed and are negative.      Physical Exam  ED Triage Vitals [05/12/24 0931]   Temperature Pulse Respirations Blood Pressure SpO2   98.1 °F (36.7 °C) 93 18 157/94 98 %      Temp Source Heart Rate Source Patient Position - Orthostatic VS BP Location FiO2 (%)   Oral Monitor Sitting Left arm --      Pain Score       10 - Worst Possible Pain             Orthostatic Vital Signs  Vitals:    05/12/24 0931   BP: 157/94   Pulse: 93   Patient Position - Orthostatic VS: Sitting       Physical Exam  Vitals and nursing  note reviewed.   Constitutional:       General: She is not in acute distress.     Appearance: She is well-developed.   HENT:      Head: Normocephalic and atraumatic.   Eyes:      Conjunctiva/sclera: Conjunctivae normal.   Cardiovascular:      Rate and Rhythm: Normal rate and regular rhythm.      Heart sounds: No murmur heard.  Pulmonary:      Effort: Pulmonary effort is normal. No respiratory distress.      Breath sounds: Normal breath sounds.   Abdominal:      Palpations: Abdomen is soft.      Tenderness: There is no abdominal tenderness.   Musculoskeletal:         General: No swelling.      Cervical back: Neck supple.      Comments: Paraspinal tenderness to palpation, diffuse tenderness to palpation of the thigh.    Skin:     General: Skin is warm and dry.      Capillary Refill: Capillary refill takes less than 2 seconds.   Neurological:      Mental Status: She is alert.      Cranial Nerves: Cranial nerves 2-12 are intact.      Sensory: Sensation is intact.      Motor: Motor function is intact.      Coordination: Coordination is intact.      Gait: Gait is intact.      Comments: 5/5 strength in the b/l upper and lower extremities.  Negative straight leg raise b/l.    Psychiatric:         Mood and Affect: Mood normal.         ED Medications  Medications   ketorolac (TORADOL) injection 15 mg (15 mg Intravenous Given 5/12/24 1054)   methocarbamol (ROBAXIN) tablet 500 mg (500 mg Oral Given 5/12/24 1053)   acetaminophen (TYLENOL) tablet 975 mg (975 mg Oral Given 5/12/24 1052)       Diagnostic Studies  Results Reviewed       Procedure Component Value Units Date/Time    Comprehensive metabolic panel [[id number]]  (Abnormal) Collected: 05/12/24 1103    Lab Status: Final result Specimen: Blood from Arm, Left Updated: 05/12/24 1159     Sodium 136 mmol/L      Potassium 4.7 mmol/L      Chloride 104 mmol/L      CO2 25 mmol/L      ANION GAP 7 mmol/L      BUN 20 mg/dL      Creatinine 0.73 mg/dL      Glucose 162 mg/dL      Calcium  9.3 mg/dL      AST 19 U/L      ALT 13 U/L      Alkaline Phosphatase 84 U/L      Total Protein 7.8 g/dL      Albumin 4.4 g/dL      Total Bilirubin 0.52 mg/dL      eGFR 84 ml/min/1.73sq m     Narrative:      National Kid[employer] Foundation guidelines for Chronic Kidney Disease (CKD):     Stage 1 with normal or high GFR (GFR > 90 mL/min/1.73 square meters)    Stage 2 Mild CKD (GFR = 60-89 mL/min/1.73 square meters)    Stage 3A Moderate CKD (GFR = 45-59 mL/min/1.73 square meters)    Stage 3B Moderate CKD (GFR = 30-44 mL/min/1.73 square meters)    Stage 4 Severe CKD (GFR = 15-29 mL/min/1.73 square meters)    Stage 5 End Stage CKD (GFR <15 mL/min/1.73 square meters)  Note: GFR calculation is accurate only with a steady state creatinine    CK [[id number]]  (Normal) Collected: 05/12/24 1103    Lab Status: Final result Specimen: Blood from Arm, Left Updated: 05/12/24 1159     Total CK 54 U/L     C-reactive protein [[id number]]  (Normal) Collected: 05/12/24 1103    Lab Status: Final result Specimen: Blood from Arm, Left Updated: 05/12/24 1159     CRP 1.4 mg/L     CBC and differential [[id number]]  (Abnormal) Collected: 05/12/24 1103    Lab Status: Final result Specimen: Blood from Arm, Left Updated: 05/12/24 1120     WBC 9.60 Thousand/uL      RBC 5.31 Million/uL      Hemoglobin 15.1 g/dL      Hematocrit 47.2 %      MCV 89 fL      MCH 28.4 pg      MCHC 32.0 g/dL      RDW 13.0 %      MPV 10.9 fL      Platelets 245 Thousands/uL      nRBC 0 /100 WBCs      Segmented % 83 %      Immature Grans % 0 %      Lymphocytes % 12 %      Monocytes % 4 %      Eosinophils Relative 1 %      Basophils Relative 0 %      Absolute Neutrophils 7.96 Thousands/µL      Absolute Immature Grans 0.02 Thousand/uL      Absolute Lymphocytes 1.14 Thousands/µL      Absolute Monocytes 0.40 Thousand/µL      Eosinophils Absolute 0.05 Thousand/µL      Basophils Absolute 0.03 Thousands/µL     Sedimentation rate, automated [[id number]]  (Abnormal) Collected: 05/12/24  1103    Lab Status: Final result Specimen: Blood from Arm, Left Updated: 05/12/24 1117     Sed Rate 39 mm/hour                    No orders to display         Procedures  Procedures      ED Course               Identification of Seniors at Risk      Flowsheet Row Most Recent Value   (ISAR) Identification of Seniors at Risk    Before the illness or injury that brought you to the Emergency, did you need someone to help you on a regular basis? 0 Filed at: 05/12/2024 0933   In the last 24 hours, have you needed more help than usual? 0 Filed at: 05/12/2024 0933   Have you been hospitalized for one or more nights during the past 6 months? 1 Filed at: 05/12/2024 0933   In general, do you see well? 0 Filed at: 05/12/2024 0933   In general, do you have serious problems with your memory? 0 Filed at: 05/12/2024 0933   Do you take more than three different medications every day? 0 Filed at: 05/12/2024 0933   ISAR Score 1 Filed at: 05/12/2024 0933                      SBIRT 22yo+      Flowsheet Row Most Recent Value   Initial Alcohol Screen: US AUDIT-C     1. How often do you have a drink containing alcohol? 0 Filed at: 05/12/2024 0933   2. How many drinks containing alcohol do you have on a typical day you are drinking?  0 Filed at: 05/12/2024 0933   3b. FEMALE Any Age, or MALE 65+: How often do you have 4 or more drinks on one occassion? 0 Filed at: 05/12/2024 0933   Audit-C Score 0 Filed at: 05/12/2024 0933   DORY: How many times in the past year have you...    Used an illegal drug or used a prescription medication for non-medical reasons? Never Filed at: 05/12/2024 0933                  Medical Decision Making  The patient is a 68 y.o. female with a history of asthma, chronic pain who presents to the ED with diffuse myalgias ongoing for 2 days.     Ddx: muscular strain/sprain, myositis, polymyalgia rheumatic.    Patient complains of diffuse myalgias and tension-type headache.  No reports of fever, non-toxic appearing, no  neck stiffness.  Will treat patient symptomatically for likely muscular strain/sprain.  Will obtain basic labs inflammatory markers and CK to evaluate for above.    Following treatment, patient's muscle pain entirely resolved.  Will discharge patient home with short course of Robaxin and recommending continued follow-up with PCP.  Patient agreeable and understanding, discharged home in stable condition, ambulated off ED floor on her own without any difficulty.       Amount and/or Complexity of Data Reviewed  Labs: ordered.    Risk  OTC drugs.  Prescription drug management.          Disposition  Final diagnoses:   Myalgia     Time reflects when diagnosis was documented in both MDM as applicable and the Disposition within this note       Time User Action Codes Description Comment    5/12/2024 12:10 PM Sandra Patterson [M79.10] Myalgia           ED Disposition       ED Disposition   Discharge    Condition   Stable    Date/Time   Sun May 12, 2024 1211    Comment   Huedavid Short discharge to home/self care.                   Follow-up Information       Follow up With Specialties Details Why Contact Info Additional Information    [facility] Schedule an appointment as soon as possible for a visit   [address] [facility] [address]  [phone] [facility], [address], [place], [address]   [phone]            Discharge Medication List as of 5/12/2024 12:11 PM        START taking these medications    Details   methocarbamol (ROBAXIN) 500 mg tablet Take 1 tablet (500 mg total) by mouth 2 (two) times a day for 10 doses, Starting Sun 5/12/2024, Until Fri 5/17/2024, Normal           CONTINUE these medications which have NOT CHANGED    Details   albuterol (Ventolin HFA) 90 mcg/act inhaler Inhale 2 puffs every 6 (six) hours as needed for wheezing, Starting Fri 11/13/2020, Normal      bisacodyl  (DULCOLAX) 5 mg EC tablet Take 1 tablet (5 mg total) by mouth once for 1 dose, Starting Mon 2/20/2023, Normal      Blood Pressure Monitoring (Blood Pressure Cuff) MISC Use daily Arm cuff only, Starting Fri 11/5/2021, Normal      calcium polycarbophil (FIBERCON) 625 mg tablet Take 1 tablet (625 mg total) by mouth daily, Starting Tue 9/20/2022, Until Thu 10/20/2022, Normal      naproxen (NAPROSYN) 500 mg tablet Take 1 tablet (500 mg total) by mouth 2 (two) times a day with meals, Starting Sat 6/26/2021, Normal           No discharge procedures on file.    PDMP Review       None             ED Provider  Attending physically available and evaluated Hue Short. I managed the patient along with the ED Attending.    Electronically Signed by           Sandra Patterson MD  05/12/24 0548

## 2024-05-12 NOTE — ED ATTENDING ATTESTATION
5/12/2024  I, Dani Mack DO, saw and evaluated the patient. I have discussed the patient with the resident/non-physician practitioner and agree with the resident's/non-physician practitioner's findings, Plan of Care, and MDM as documented in the resident's/non-physician practitioner's note, except where noted. All available labs and Radiology studies were reviewed.  I was present for key portions of any procedure(s) performed by the resident/non-physician practitioner and I was immediately available to provide assistance.       At this point I agree with the current assessment done in the Emergency Department.  I have conducted an independent evaluation of this patient a history and physical is as follows:    68 yof with diffuse myalgias, upper back radiates down. Happened before, had symptomatic treatment, no workup. Normal exam right now, normal vitals. No other associated sx including weight loss, night sweats B symptoms, a dysuria urinary retention chest pain or shortness of breath.  Plan basic labs inflammatory markers symptomatic treatment    ED Course         Critical Care Time  Procedures

## 2024-07-14 ENCOUNTER — APPOINTMENT (EMERGENCY)
Dept: RADIOLOGY | Facility: HOSPITAL | Age: 69
End: 2024-07-14
Payer: COMMERCIAL

## 2024-07-14 ENCOUNTER — HOSPITAL ENCOUNTER (EMERGENCY)
Facility: HOSPITAL | Age: 69
Discharge: HOME | End: 2024-07-14
Attending: EMERGENCY MEDICINE
Payer: COMMERCIAL

## 2024-07-14 VITALS
RESPIRATION RATE: 21 BRPM | DIASTOLIC BLOOD PRESSURE: 80 MMHG | OXYGEN SATURATION: 96 % | TEMPERATURE: 97.5 F | SYSTOLIC BLOOD PRESSURE: 126 MMHG | HEART RATE: 84 BPM

## 2024-07-14 DIAGNOSIS — J45.20 MILD INTERMITTENT ASTHMA WITHOUT COMPLICATION: ICD-10-CM

## 2024-07-14 DIAGNOSIS — J45.901 ASTHMA EXACERBATION: Primary | ICD-10-CM

## 2024-07-14 LAB
ATRIAL RATE: 84 BPM
P AXIS: 58 DEGREES
PR INTERVAL: 156 MS
QRS AXIS: -24 DEGREES
QRSD INTERVAL: 74 MS
QT INTERVAL: 356 MS
QTC INTERVAL: 420 MS
T WAVE AXIS: 66 DEGREES
VENTRICULAR RATE: 84 BPM

## 2024-07-14 PROCEDURE — 99284 EMERGENCY DEPT VISIT MOD MDM: CPT | Performed by: EMERGENCY MEDICINE

## 2024-07-14 PROCEDURE — 93005 ELECTROCARDIOGRAM TRACING: CPT

## 2024-07-14 PROCEDURE — 99284 EMERGENCY DEPT VISIT MOD MDM: CPT

## 2024-07-14 PROCEDURE — 93010 ELECTROCARDIOGRAM REPORT: CPT | Performed by: INTERNAL MEDICINE

## 2024-07-14 PROCEDURE — 71046 X-RAY EXAM CHEST 2 VIEWS: CPT

## 2024-07-14 RX ORDER — ALBUTEROL SULFATE 90 UG/1
2 AEROSOL, METERED RESPIRATORY (INHALATION) EVERY 6 HOURS PRN
Qty: 18 G | Refills: 0 | Status: SHIPPED | OUTPATIENT
Start: 2024-07-14

## 2024-07-14 RX ORDER — IPRATROPIUM BROMIDE AND ALBUTEROL SULFATE 2.5; .5 MG/3ML; MG/3ML
3 SOLUTION RESPIRATORY (INHALATION) ONCE
Status: COMPLETED | OUTPATIENT
Start: 2024-07-14 | End: 2024-07-14

## 2024-07-14 RX ORDER — PREDNISONE 20 MG/1
60 TABLET ORAL ONCE
Status: COMPLETED | OUTPATIENT
Start: 2024-07-14 | End: 2024-07-14

## 2024-07-14 RX ORDER — PREDNISONE 20 MG/1
60 TABLET ORAL DAILY
Qty: 12 TABLET | Refills: 0 | Status: SHIPPED | OUTPATIENT
Start: 2024-07-15 | End: 2024-07-19

## 2024-07-14 RX ORDER — ALBUTEROL SULFATE 90 UG/1
2 AEROSOL, METERED RESPIRATORY (INHALATION) ONCE
Status: COMPLETED | OUTPATIENT
Start: 2024-07-14 | End: 2024-07-14

## 2024-07-14 RX ADMIN — IPRATROPIUM BROMIDE AND ALBUTEROL SULFATE 3 ML: 2.5; .5 SOLUTION RESPIRATORY (INHALATION) at 11:32

## 2024-07-14 RX ADMIN — PREDNISONE 60 MG: 20 TABLET ORAL at 13:33

## 2024-07-14 RX ADMIN — ALBUTEROL SULFATE 2 PUFF: 90 AEROSOL, METERED RESPIRATORY (INHALATION) at 13:37

## 2024-07-14 NOTE — ED ATTENDING ATTESTATION
7/14/2024  I, Silvia Warren MD, saw and evaluated the patient. I have discussed the patient with the resident/non-physician practitioner and agree with the resident's/non-physician practitioner's findings, Plan of Care, and MDM as documented in the resident's/non-physician practitioner's note, except where noted. All available labs and Radiology studies were reviewed.  I was present for key portions of any procedure(s) performed by the resident/non-physician practitioner and I was immediately available to provide assistance.       At this point I agree with the current assessment done in the Emergency Department.  I have conducted an independent evaluation of this patient a history and physical is as follows:  This is a 68-year-old woman with history of asthma presenting with increased work of breathing and shortness of breath.  Patient states that she started with a viral URI 3 days ago, runny nose, sore throat, mild bodyaches, cough, and congestion.  She states that she used her rescue inhaler multiple times, and ran out.  She states that she has been having increased work of breathing.  The patient has never been admitted to the hospital for asthma.  She last received steroids last December, and states it helped immensely.  She has a history of diabetes, but states her sugars are tightly controlled and she does not take medications for her diabetes.  She does not of pleuritic pain, lateralizing limb swelling, or chest pain.  She states that she has a history of diabetes, hyperlipidemia, but denies any exertional symptoms.  Review of systems otherwise -12 systems reviewed.  When I examined the patient she had already received bronchodilators.  On exam vital signs were reviewed.  Patient is awake, alert, interactive.  The patient's pupils are equally round reactive to light.  Oropharynx is clear with moist mucous membranes.  Neck is supple and nontender with no adenopathy or JVD.  Heart is regular with no  murmurs, rubs, or gallops.  Lungs are clear and equal with no wheezes, rales, or rhonchi.  Abdomen is soft and nontender with no masses, rebound, or guarding. There is no CVA tenderness.  The patient was completely exposed.  There is no skin breakdown.  There are no rashes or skin changes.  Extremities are warm and well perfused with good pulses. The patient has normal strength, sensation, and cranial nerves.  MEDICAL DECISION MAKING    Number and Complexity of Problems  Differential diagnosis: Asthma exacerbation, viral syndrome, doubt cardiac, doubt pneumonia    Medical Decision Making Data  External documents reviewed:   My EKG interpretation: Sinus rhythm, no ischemia, no ectopy, normal intervals  My CT interpretation:   My X-ray interpretation: No infiltrate, normal cardiomediastinal silhouette  My ultrasound interpretation:     XR chest 2 views   Final Result      No acute cardiopulmonary disease.               Workstation performed: [id number]             Labs Reviewed - No data to display    Labs reviewed by me are significant for:     Clinical decision rules/scores are significant for:     Discussed case with:   Considered admission for:     Treatment and Disposition  ED course: Patient improved with bronchodilators, requesting steroids.  Patient has well-controlled diabetes and has access to a glucometer.  Instructed to increase the frequency with which she checks her glucose, will plan to discharge with diagnosis of 1 asthma exacerbation  Shared decision making:   Code status:     ED Course         Critical Care Time  Procedures

## 2024-07-14 NOTE — DISCHARGE INSTRUCTIONS
Ms. Short,    You were seen in the [facility] ED for an asthma exacerbation. We took an x-ray of your chest which was clear and did not show any pneumonia. We are sending you home with prednisone and re-prescribing you an albuterol inhaler since you mentioned that you are out of the inhaler.    Please take 60 mg of prednisone daily by mouth for 4 days starting tomorrow 7/15. Check your blood sugar frequently as well as prednisone can affect your blood sugar and cause it to rise.    If you experience further shortness of breath, coughing that gets worse, coughing with blood, or any other emergent symptoms, please feel free to come back to the emergency department.

## 2024-07-14 NOTE — ED PROVIDER NOTES
History  Chief Complaint   Patient presents with    Asthma     Patient arrives with asthma exacerbation. She said her home medications are not helping        Asthma  Associated symptoms: cough, ear pain (Left ear), myalgias and shortness of breath    Associated symptoms: no abdominal pain, no chest pain, no diarrhea, no fever, no nausea, no rash, no rhinorrhea, no sore throat and no vomiting      Ms. Short is a 68 year old female with a past medical history of asthma and diabetes who presents here to the ED with shortness of breath.  She says that she had flu-like symptoms that began three days ago. She says that she had a cough, and muscle aches. She then started feeling short of breath. She tried her home asthma medications which provided little to no relief. She also tried some over the counter mediations (Primatene). She says that she has remained short of breath for the past few days and came in because she did not want her breathing to become worse.     Prior to Admission Medications   Prescriptions Last Dose Informant Patient Reported? Taking?   Blood Pressure Monitoring (Blood Pressure Cuff) MISC  Self No No   Sig: Use daily Arm cuff only   Patient not taking: Reported on 2/20/2023   albuterol (Ventolin HFA) 90 mcg/act inhaler  Self No No   Sig: Inhale 2 puffs every 6 (six) hours as needed for wheezing   bisacodyl (DULCOLAX) 5 mg EC tablet   No No   Sig: Take 1 tablet (5 mg total) by mouth once for 1 dose   calcium polycarbophil (FIBERCON) 625 mg tablet   No No   Sig: Take 1 tablet (625 mg total) by mouth daily   methocarbamol (ROBAXIN) 500 mg tablet   No No   Sig: Take 1 tablet (500 mg total) by mouth 2 (two) times a day for 10 doses   naproxen (NAPROSYN) 500 mg tablet  Self No No   Sig: Take 1 tablet (500 mg total) by mouth 2 (two) times a day with meals   Patient not taking: Reported on 11/16/2022      Facility-Administered Medications: None       Past Medical History:   Diagnosis Date    Asthma     Back  pain     Bowel incontinence     Chronic pain     Diabetes mellitus (HCC)     History of breast implant     Psychiatric disorder     panic attacks       Past Surgical History:   Procedure Laterality Date    AUGMENTATION MAMMAPLASTY      patient denies    BREAST SURGERY      lift    [place] COLONOSCOPY FLX DX W/COLLJ SPEC WHEN PFRMD N/A 05/11/2017    Procedure: COLONOSCOPY;  Surgeon: Dov Martins MD;  Location: [facility];  Service: Gastroenterology       Family History   Problem Relation Age of Onset    Lung cancer Mother     Asthma Sister      I have reviewed and agree with the history as documented.    E-Cigarette/Vaping    E-Cigarette Use Never User      E-Cigarette/Vaping Substances    Nicotine No     THC No     CBD No     Flavoring No     Other No     Unknown No      Social History     Tobacco Use    Smoking status: Never    Smokeless tobacco: Never   Vaping Use    Vaping status: Never Used   Substance Use Topics    Alcohol use: No    Drug use: No        Review of Systems   Constitutional:  Negative for chills and fever.   HENT:  Positive for ear pain (Left ear). Negative for rhinorrhea and sore throat.    Eyes:  Negative for visual disturbance.   Respiratory:  Positive for cough and shortness of breath. Negative for chest tightness.    Cardiovascular:  Negative for chest pain and palpitations.   Gastrointestinal:  Negative for abdominal pain, constipation, diarrhea, nausea and vomiting.   Genitourinary:  Negative for dysuria and hematuria.   Musculoskeletal:  Positive for myalgias. Negative for arthralgias and back pain.   Skin:  Negative for rash.   Neurological:  Negative for dizziness.   All other systems reviewed and are negative.      Physical Exam  ED Triage Vitals   Temperature Pulse Respirations Blood Pressure SpO2   07/14/24 1054 07/14/24 1054 07/14/24 1054 07/14/24 1054 07/14/24 1130   97.5 °F (36.4 °C) (!) 116 16 110/88 95 %      Temp Source Heart Rate Source Patient Position - Orthostatic VS BP Location  FiO2 (%)   07/14/24 1054 07/14/24 1054 07/14/24 1054 07/14/24 1054 --   Temporal Monitor Sitting Left arm       Pain Score       --                    Orthostatic Vital Signs  Vitals:    07/14/24 1054   BP: 110/88   Pulse: (!) 116   Patient Position - Orthostatic VS: Sitting       Physical Exam  Vitals and nursing note reviewed.   Constitutional:       General: She is not in acute distress.     Appearance: She is well-developed.   HENT:      Head: Normocephalic and atraumatic.   Eyes:      Conjunctiva/sclera: Conjunctivae normal.   Cardiovascular:      Rate and Rhythm: Regular rhythm. Tachycardia present.      Heart sounds: No murmur heard.  Pulmonary:      Effort: Respiratory distress present.      Breath sounds: Normal breath sounds. Stridor present. No wheezing.   Chest:      Chest wall: No tenderness.   Abdominal:      Palpations: Abdomen is soft.      Tenderness: There is no abdominal tenderness.   Musculoskeletal:         General: No swelling.      Cervical back: Neck supple.   Skin:     General: Skin is warm and dry.      Capillary Refill: Capillary refill takes less than 2 seconds.      Coloration: Skin is not jaundiced.      Findings: No rash.   Neurological:      Mental Status: She is alert.   Psychiatric:         Mood and Affect: Mood normal.         ED Medications  Medications   ipratropium-albuterol (DUO-NEB) 0.5-2.5 mg/3 mL inhalation solution 3 mL (3 mL Nebulization Given 7/14/24 1132)       Diagnostic Studies  Results Reviewed       None                   XR chest 2 views    (Results Pending)         Procedures  Procedures      ED Course                                       Medical Decision Making  Amount and/or Complexity of Data Reviewed  Radiology: ordered.    Risk  Prescription drug management.      Ms. Short is a 68 year old female who has presented here with asthma exacerbations in the past. She is presenting here with shortness of breath that she says is similar to her exacerbations in the  past. On exam, her lungs sounded clear, although when I walked in the room there was clear stridor.   Due to her having flu-like symptoms in the past, it's fair to consider an infectious etiology and a possible pneumonia. I sent in for a chest x-ray. This could also help rule out pneumothorax although  this is lower on my differential considering that there were clear breath sounds on both sides of her lungs.   I put in for a duoneb treatment to help with her breathing.  I also put in for a EKG just to make sure there is no CHF process going on.   84 bpm, sinus rhythm, and questionable left sided deviation.     Patient sent home with new prescription for inhaler and a 5 day course of prednisone.       Disposition  Final diagnoses:   None     ED Disposition       None          Follow-up Information    None         Patient's Medications   Discharge Prescriptions    No medications on file     No discharge procedures on file.    PDMP Review       None             ED Provider  Attending physically available and evaluated Hue Short. I managed the patient along with the ED Attending.    Electronically Signed by           Walt Gomes MD  07/16/24 3072

## 2024-08-19 ENCOUNTER — HOSPITAL ENCOUNTER (EMERGENCY)
Facility: HOSPITAL | Age: 69
Discharge: HOME | End: 2024-08-19
Attending: EMERGENCY MEDICINE

## 2024-08-19 VITALS
DIASTOLIC BLOOD PRESSURE: 105 MMHG | WEIGHT: 140 LBS | HEART RATE: 80 BPM | BODY MASS INDEX: 23.32 KG/M2 | OXYGEN SATURATION: 99 % | SYSTOLIC BLOOD PRESSURE: 170 MMHG | TEMPERATURE: 98 F | HEIGHT: 65 IN | RESPIRATION RATE: 18 BRPM

## 2024-08-19 DIAGNOSIS — M79.604 RIGHT LEG PAIN: Primary | ICD-10-CM

## 2024-08-19 PROCEDURE — 96372 THER/PROPH/DIAG INJ SC/IM: CPT

## 2024-08-19 PROCEDURE — 99282 EMERGENCY DEPT VISIT SF MDM: CPT

## 2024-08-19 PROCEDURE — 99284 EMERGENCY DEPT VISIT MOD MDM: CPT | Performed by: EMERGENCY MEDICINE

## 2024-08-19 RX ORDER — KETOROLAC TROMETHAMINE 30 MG/ML
15 INJECTION, SOLUTION INTRAMUSCULAR; INTRAVENOUS ONCE
Status: COMPLETED | OUTPATIENT
Start: 2024-08-19 | End: 2024-08-19

## 2024-08-19 RX ORDER — ACETAMINOPHEN 325 MG/1
975 TABLET ORAL ONCE
Status: COMPLETED | OUTPATIENT
Start: 2024-08-19 | End: 2024-08-19

## 2024-08-19 RX ORDER — LIDOCAINE 50 MG/G
1 PATCH TOPICAL ONCE
Status: DISCONTINUED | OUTPATIENT
Start: 2024-08-19 | End: 2024-08-19 | Stop reason: HOSPADM

## 2024-08-19 RX ADMIN — LIDOCAINE 1 PATCH: 50 PATCH CUTANEOUS at 13:13

## 2024-08-19 RX ADMIN — KETOROLAC TROMETHAMINE 15 MG: 30 INJECTION, SOLUTION INTRAMUSCULAR; INTRAVENOUS at 13:13

## 2024-08-19 RX ADMIN — ACETAMINOPHEN 975 MG: 325 TABLET ORAL at 13:13

## 2024-08-19 NOTE — DISCHARGE INSTRUCTIONS
Please follow-up with primary care provider and physical therapy-a referral was made for you.  Please return to the ED with new or worsening symptoms-see attached.  Continue treating symptoms conservatively with Tylenol/ibuprofen/lidocaine patches over-the-counter.

## 2024-08-19 NOTE — ED ATTENDING ATTESTATION
8/19/2024  I, Isabela Rosales DO, saw and evaluated the patient. I have discussed the patient with the resident/non-physician practitioner and agree with the resident's/non-physician practitioner's findings, Plan of Care, and MDM as documented in the resident's/non-physician practitioner's note, except where noted. All available labs and Radiology studies were reviewed.  I was present for key portions of any procedure(s) performed by the resident/non-physician practitioner and I was immediately available to provide assistance.       At this point I agree with the current assessment done in the Emergency Department.  I have conducted an independent evaluation of this patient a history and physical is as follows:    68-year-old female presents with right-sided low back/leg pain.  Patient states the pain seems to start in her low back on the right side/buttocks and radiates down the right leg.  No weakness.  No trauma.  No bowel or bladder complaints.  On exam-no acute distress, heart regular, no respiratory distress, no midline spinal tenderness, tenderness in the right SI and right buttock.  Plan-musculoskeletal pain, possible sciatica versus piriformis syndrome.  Will treat symptomatically, refer to physical therapy    ED Course         Critical Care Time  Procedures

## 2024-09-26 ENCOUNTER — OFFICE VISIT (OUTPATIENT)
Dept: INTERNAL MEDICINE CLINIC | Facility: CLINIC | Age: 69
End: 2024-09-26

## 2024-09-26 VITALS
SYSTOLIC BLOOD PRESSURE: 145 MMHG | DIASTOLIC BLOOD PRESSURE: 80 MMHG | BODY MASS INDEX: 24.13 KG/M2 | WEIGHT: 145 LBS | HEART RATE: 79 BPM | TEMPERATURE: 97.9 F

## 2024-09-26 DIAGNOSIS — G57.01 PIRIFORMIS SYNDROME OF RIGHT SIDE: Primary | ICD-10-CM

## 2024-09-26 PROBLEM — Z12.31 SCREENING MAMMOGRAM, ENCOUNTER FOR: Status: RESOLVED | Noted: 2020-01-30 | Resolved: 2024-09-26

## 2024-09-26 PROBLEM — K57.30 DIVERTICULOSIS OF LARGE INTESTINE: Status: RESOLVED | Noted: 2019-02-06 | Resolved: 2024-09-26

## 2024-09-26 PROBLEM — R53.83 OTHER FATIGUE: Status: RESOLVED | Noted: 2020-01-30 | Resolved: 2024-09-26

## 2024-09-26 PROBLEM — F43.21 SITUATIONAL DEPRESSION: Status: RESOLVED | Noted: 2021-11-05 | Resolved: 2024-09-26

## 2024-09-26 PROBLEM — F51.05 INSOMNIA SECONDARY TO SITUATIONAL DEPRESSION: Status: RESOLVED | Noted: 2021-11-05 | Resolved: 2024-09-26

## 2024-09-26 PROBLEM — Z23 NEED FOR PNEUMOCOCCAL VACCINATION: Status: RESOLVED | Noted: 2020-01-30 | Resolved: 2024-09-26

## 2024-09-26 PROBLEM — K59.00 CONSTIPATION: Status: RESOLVED | Noted: 2020-01-30 | Resolved: 2024-09-26

## 2024-09-26 PROBLEM — F43.21 INSOMNIA SECONDARY TO SITUATIONAL DEPRESSION: Status: RESOLVED | Noted: 2021-11-05 | Resolved: 2024-09-26

## 2024-09-26 PROCEDURE — 99213 OFFICE O/P EST LOW 20 MIN: CPT

## 2024-09-26 RX ORDER — METHOCARBAMOL 500 MG/1
500 TABLET, FILM COATED ORAL 4 TIMES DAILY
Qty: 120 TABLET | Refills: 0 | Status: SHIPPED | OUTPATIENT
Start: 2024-09-26 | End: 2024-10-26

## 2024-09-26 RX ORDER — NAPROXEN 500 MG/1
500 TABLET ORAL 2 TIMES DAILY WITH MEALS
Qty: 30 TABLET | Refills: 1 | Status: SHIPPED | OUTPATIENT
Start: 2024-09-26

## 2024-09-26 RX ORDER — LIDOCAINE 4 G/G
4 PATCH TOPICAL DAILY
Qty: 1 PATCH | Refills: 0 | Status: SHIPPED | OUTPATIENT
Start: 2024-09-26

## 2024-09-26 NOTE — PROGRESS NOTES
Ambulatory Visit  Name: Hue Short      : 1955      MRN: [id number]  Encounter Provider: Federico Portillo MD  Encounter Date: 2024   Encounter department: [facility]    Assessment & Plan  Piriformis syndrome of right side  Patient has 2 mo ho lower back 8/10, shooting pain that radiates down towards right gluteal muscle. Patient went to the ED  for similar symptoms and was given epidural injection for likely piriformis syndrome -- which completely neutralized the pain. Patient claims she went to the gym 4 d ago, jogged on treadmill and did calf raises on a machine,  and that acutely worsened pain. She was prescribed naproxen and robaxin 500 mg by ED, and she did not  the script. I will prescribe the robaxin, and a lidocaine patch. Counseled patient on avoiding exercise for the next few weeks. On physical exam, pt is tender to palpation in right paraspinal region, point tenderness ot right buttocks, and SLT positive on R side. Denies numbness, tingling, and there are no sensory deficits present on exam. DTR 2 + BL. Will opt against imaging at this time, given there was specific trigger, and no overt bony tenderness or neurological deficits. Patient is also ambulating well, but with pain.     Plan:  Robaxin 500 mg up to TID  Lidocaine patch    Orders:    methocarbamol (ROBAXIN) 500 mg tablet; Take 1 tablet (500 mg total) by mouth 4 (four) times a day    Lidocaine 4 % PTCH; Apply 4 1e11 [employer] topically in the morning    naproxen (Naprosyn) 500 mg tablet; Take 1 tablet (500 mg total) by mouth 2 (two) times a day with meals       History of Present Illness     69 yo F presents for same day appointment to discuss acutely worsening lower back pain. I attempted to  patient on diabetes wellness, but she was not interested in discussing her general health.           Review of Systems   Constitutional:  Positive for activity change.   HENT: Negative.      Eyes: Negative.    Respiratory: Negative.     Cardiovascular: Negative.    Gastrointestinal: Negative.    Endocrine: Negative.    Genitourinary: Negative.    Musculoskeletal:  Positive for arthralgias, back pain and gait problem. Negative for myalgias, neck pain and neck stiffness.   Skin: Negative.            Objective     /80 (BP Location: Right arm, Patient Position: Sitting, Cuff Size: Large)   Pulse 79   Temp 97.9 °F (36.6 °C) (Temporal)   Wt 65.8 kg (145 lb)   BMI 24.13 kg/m²     Physical Exam  Constitutional:       General: She is not in acute distress.     Appearance: Normal appearance. She is not ill-appearing, toxic-appearing or diaphoretic.   Cardiovascular:      Rate and Rhythm: Normal rate and regular rhythm.      Pulses: Normal pulses.      Heart sounds: Normal heart sounds. No murmur heard.     No friction rub. No gallop.   Pulmonary:      Effort: Pulmonary effort is normal. No respiratory distress.      Breath sounds: Normal breath sounds. No stridor. No wheezing, rhonchi or rales.   Chest:      Chest wall: No tenderness.   Abdominal:      General: Abdomen is flat. Bowel sounds are normal. There is no distension.      Palpations: Abdomen is soft. There is no mass.      Tenderness: There is no abdominal tenderness. There is no guarding or rebound.      Hernia: No hernia is present.   Musculoskeletal:         General: Tenderness and signs of injury present.      Cervical back: Normal.      Thoracic back: Normal.      Lumbar back: Signs of trauma and spasms present. No bony tenderness. Decreased range of motion. Positive right straight leg raise test.      Right lower leg: No edema.      Left lower leg: No edema.   Skin:     General: Skin is warm.      Coloration: Skin is not jaundiced or pale.      Findings: No bruising, erythema, lesion or rash.   Neurological:      Mental Status: She is alert.      Sensory: No sensory deficit.      Motor: No weakness.      Coordination: Coordination  normal.      Gait: Gait abnormal.      Deep Tendon Reflexes: Reflexes normal.

## 2024-09-26 NOTE — ASSESSMENT & PLAN NOTE
Patient has 2 mo ho lower back 8/10, shooting pain that radiates down towards right gluteal muscle. Patient went to the ED 8/19 for similar symptoms and was given epidural injection for likely piriformis syndrome -- which completely neutralized the pain. Patient claims she went to the gym 4 d ago, jogged on treadmill and did calf raises on a machine,  and that acutely worsened pain. She was prescribed naproxen and robaxin 500 mg by ED, and she did not  the script. I will prescribe the robaxin, and a lidocaine patch. Counseled patient on avoiding exercise for the next few weeks. On physical exam, pt is tender to palpation in right paraspinal region, point tenderness ot right buttocks, and SLT positive on R side. Denies numbness, tingling, and there are no sensory deficits present on exam. DTR 2 + BL. Will opt against imaging at this time, given there was specific trigger, and no overt bony tenderness or neurological deficits. Patient is also ambulating well, but with pain.     Plan:  Robaxin 500 mg up to TID  Lidocaine patch    Orders:    methocarbamol (ROBAXIN) 500 mg tablet; Take 1 tablet (500 mg total) by mouth 4 (four) times a day    Lidocaine 4 % PTCH; Apply 4 1e11 Vector [employer]omes topically in the morning    naproxen (Naprosyn) 500 mg tablet; Take 1 tablet (500 mg total) by mouth 2 (two) times a day with meals

## 2024-12-23 NOTE — ED PROVIDER NOTES
History  Chief Complaint   Patient presents with    Leg Pain     Per pt she has right leg pain, she took tramadol no pain relief      Patient is a 68-year-old female with past medical history of chronic pain, diabetes presenting with right lower back/right leg pain.  Patient states that it feels like it originates in her right lower back and goes down the buttocks and to the lateral side of her leg to the calf and not her foot.  She is able to ambulate but has pain while doing so.  She has worse pain when she extends her back and feels better when she is bending forward.  She denies trauma, numbness, weakness, bowel/bladder incontinence/retention.        Prior to Admission Medications   Prescriptions Last Dose Informant Patient Reported? Taking?   Blood Pressure Monitoring (Blood Pressure Cuff) MISC  Self No No   Sig: Use daily Arm cuff only   Patient not taking: Reported on 2/20/2023   albuterol (Ventolin HFA) 90 mcg/act inhaler   No No   Sig: Inhale 2 puffs every 6 (six) hours as needed for wheezing   bisacodyl (DULCOLAX) 5 mg EC tablet   No No   Sig: Take 1 tablet (5 mg total) by mouth once for 1 dose   calcium polycarbophil (FIBERCON) 625 mg tablet   No No   Sig: Take 1 tablet (625 mg total) by mouth daily   methocarbamol (ROBAXIN) 500 mg tablet   No No   Sig: Take 1 tablet (500 mg total) by mouth 2 (two) times a day for 10 doses   naproxen (NAPROSYN) 500 mg tablet  Self No No   Sig: Take 1 tablet (500 mg total) by mouth 2 (two) times a day with meals   Patient not taking: Reported on 11/16/2022      Facility-Administered Medications: None       Past Medical History:   Diagnosis Date    Asthma     Back pain     Bowel incontinence     Chronic pain     Diabetes mellitus (HCC)     History of breast implant     Psychiatric disorder     panic attacks       Past Surgical History:   Procedure Laterality Date    AUGMENTATION MAMMAPLASTY      patient denies    BREAST SURGERY      lift    [place] COLONOSCOPY FLX DX W/COLLJ  SPEC WHEN PFRMD N/A 05/11/2017    Procedure: COLONOSCOPY;  Surgeon: Dov Martins MD;  Location: [facility];  Service: Gastroenterology       Family History   Problem Relation Age of Onset    Lung cancer Mother     Asthma Sister      I have reviewed and agree with the history as documented.    E-Cigarette/Vaping    E-Cigarette Use Never User      E-Cigarette/Vaping Substances    Nicotine No     THC No     CBD No     Flavoring No     Other No     Unknown No      Social History     Tobacco Use    Smoking status: Never    Smokeless tobacco: Never   Vaping Use    Vaping status: Never Used   Substance Use Topics    Alcohol use: No    Drug use: No        Review of Systems    Physical Exam  ED Triage Vitals [08/19/24 1103]   Temperature Pulse Respirations Blood Pressure SpO2   98 °F (36.7 °C) 80 18 (!) 170/105 99 %      Temp src Heart Rate Source Patient Position - Orthostatic VS BP Location FiO2 (%)   -- Monitor Sitting Left arm --      Pain Score       10 - Worst Possible Pain             Orthostatic Vital Signs  Vitals:    08/19/24 1103   BP: (!) 170/105   Pulse: 80   Patient Position - Orthostatic VS: Sitting       Physical Exam  Constitutional:       General: She is not in acute distress.     Appearance: Normal appearance. She is not ill-appearing, toxic-appearing or diaphoretic.   HENT:      Head: Normocephalic and atraumatic.   Cardiovascular:      Rate and Rhythm: Normal rate.   Pulmonary:      Effort: Pulmonary effort is normal. No respiratory distress.   Abdominal:      General: Abdomen is flat. There is no distension.      Palpations: Abdomen is soft.      Tenderness: There is no abdominal tenderness.   Musculoskeletal:         General: Tenderness (Right upper buttocks, no midline spinal) present. Normal range of motion.      Cervical back: Normal range of motion and neck supple. No tenderness.   Skin:     General: Skin is warm and dry.   Neurological:      General: No focal deficit present.      Mental Status:  She is alert and oriented to person, place, and time.      Cranial Nerves: No cranial nerve deficit.      Motor: No weakness.         ED Medications  Medications   acetaminophen (TYLENOL) tablet 975 mg (975 mg Oral Given 8/19/24 1313)   ketorolac (TORADOL) injection 15 mg (15 mg Intramuscular Given 8/19/24 1313)       Diagnostic Studies  Results Reviewed       None                   No orders to display         Procedures  Procedures      ED Course                             SBIRT 22yo+      Flowsheet Row Most Recent Value   Initial Alcohol Screen: US AUDIT-C     1. How often do you have a drink containing alcohol? 0 Filed at: 08/19/2024 1105   Audit-C Score 0 Filed at: 08/19/2024 1105   DORY: How many times in the past year have you...    Used an illegal drug or used a prescription medication for non-medical reasons? Never Filed at: 08/19/2024 1105                  Medical Decision Making  Patient is a 68-year-old female presenting for right lower back/leg pain.    Differential includes but not limited to sciatica, spinal stenosis, muscle spasm, piriformis syndrome, doubt acute osseous abnormality or spinal cord impingement.  No need for imaging at this time.  Patient treated symptomatically with significant improvement.  Possibly piriformis syndrome based on clinical signs and symptoms    Patient discharged with PCP follow-up and PT referral and return precautions.    Risk  OTC drugs.  Prescription drug management.          Disposition  Final diagnoses:   Right leg pain     Time reflects when diagnosis was documented in both MDM as applicable and the Disposition within this note       Time User Action Codes Description Comment    8/19/2024  1:46 PM Bladimir Michael Add [M79.604] Right leg pain           ED Disposition       ED Disposition   Discharge    Condition   Stable    Date/Time   Mon Aug 19, 2024 1346    Comment   Hue Short discharge to home/self care.                   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 Patient found to have reproducible tenderness to palpation along the posterior 11th and 12th rib paraspinal region.  Pain is reproduced with direct palpation.  Clinically suspecting likely muscular strain.  Plan to give pain meds for symptomatic relief.  Will also obtain urinalysis to rule out any underlying signs of infection or microscopic blood.  Plan to reassess patient and dispo pending medical workup. up With Specialties Details Why Contact Info Additional Information    [facility] Emergency Department Emergency Medicine   [address] [id number] [phone] [facility], [address], [place], [id number] [phone]            Discharge Medication List as of 8/19/2024  1:51 PM        CONTINUE these medications which have NOT CHANGED    Details   albuterol (Ventolin HFA) 90 mcg/act inhaler Inhale 2 puffs every 6 (six) hours as needed for wheezing, Starting Sun 7/14/2024, Print      bisacodyl (DULCOLAX) 5 mg EC tablet Take 1 tablet (5 mg total) by mouth once for 1 dose, Starting Mon 2/20/2023, Normal      Blood Pressure Monitoring (Blood Pressure Cuff) MISC Use daily Arm cuff only, Starting Fri 11/5/2021, Normal      calcium polycarbophil (FIBERCON) 625 mg tablet Take 1 tablet (625 mg total) by mouth daily, Starting Tue 9/20/2022, Until Thu 10/20/2022, Normal      methocarbamol (ROBAXIN) 500 mg tablet Take 1 tablet (500 mg total) by mouth 2 (two) times a day for 10 doses, Starting Sun 5/12/2024, Until Fri 5/17/2024, Normal      naproxen (NAPROSYN) 500 mg tablet Take 1 tablet (500 mg total) by mouth 2 (two) times a day with meals, Starting Sat 6/26/2021, Normal               PDMP Review         Value Time User    PDMP Reviewed  Yes 8/6/2024 12:33 PM Mikie Beach DO             ED Provider  Attending physically available and evaluated Hue Short. I managed the patient along with the ED Attending.    Electronically Signed by           Bladimir Michael MD  08/21/24 8827

## 2025-01-29 ENCOUNTER — TELEPHONE (OUTPATIENT)
Dept: INTERNAL MEDICINE CLINIC | Facility: CLINIC | Age: 70
End: 2025-01-29

## 2025-01-29 NOTE — TELEPHONE ENCOUNTER
Lvm/sent letter for pt to call and schedule physical/care gaps   Eye Protection Verbiage: Before proceeding with the stage, a plastic scleral shield was inserted. The globe was anesthetized with a few drops of 1% lidocaine with 1:100,000 epinephrine. Then, an appropriate sized scleral shield was chosen and coated with lacrilube ointment. The shield was gently inserted and left in place for the duration of each stage. After the stage was completed, the shield was gently removed.

## 2025-02-19 ENCOUNTER — TELEPHONE (OUTPATIENT)
Dept: INTERNAL MEDICINE CLINIC | Facility: CLINIC | Age: 70
End: 2025-02-19

## 2025-02-19 NOTE — LETTER
[facility]  [address] [address] [facility] [address]  [facility] [place] [address]  Phone#  [phone]  Fax#  [phone]    Hue Short  [address] [facility] [address]      February 19, 2025      Dear:   Hue Short         Our office has attempted to contact you several times regarding your Appointment.  Could you please contact our office at [phone].    Thank you.     Sincerely,    [facility]

## 2025-02-27 ENCOUNTER — OFFICE VISIT (OUTPATIENT)
Dept: INTERNAL MEDICINE CLINIC | Facility: CLINIC | Age: 70
End: 2025-02-27

## 2025-02-27 VITALS
HEART RATE: 82 BPM | WEIGHT: 141.6 LBS | DIASTOLIC BLOOD PRESSURE: 82 MMHG | TEMPERATURE: 98 F | SYSTOLIC BLOOD PRESSURE: 147 MMHG | OXYGEN SATURATION: 97 % | BODY MASS INDEX: 23.56 KG/M2

## 2025-02-27 DIAGNOSIS — Z12.4 SCREENING FOR CERVICAL CANCER: ICD-10-CM

## 2025-02-27 DIAGNOSIS — Z12.31 ENCOUNTER FOR SCREENING MAMMOGRAM FOR BREAST CANCER: ICD-10-CM

## 2025-02-27 DIAGNOSIS — M54.31 SCIATICA OF RIGHT SIDE: Primary | ICD-10-CM

## 2025-02-27 DIAGNOSIS — Z78.0 ASYMPTOMATIC POSTMENOPAUSAL STATE: ICD-10-CM

## 2025-02-27 DIAGNOSIS — E11.9 DIABETES MELLITUS TYPE 2 IN NONOBESE (HCC): ICD-10-CM

## 2025-02-27 LAB
LEFT EYE DIABETIC RETINOPATHY: ABNORMAL
LEFT EYE IMAGE QUALITY: ABNORMAL
LEFT EYE MACULAR EDEMA: ABNORMAL
LEFT EYE OTHER RETINOPATHY: ABNORMAL
RIGHT EYE DIABETIC RETINOPATHY: ABNORMAL
RIGHT EYE IMAGE QUALITY: ABNORMAL
RIGHT EYE MACULAR EDEMA: ABNORMAL
RIGHT EYE OTHER RETINOPATHY: ABNORMAL
SEVERITY (EYE EXAM): ABNORMAL
SL AMB POCT HEMOGLOBIN AIC: 6.3 (ref ?–6.5)

## 2025-02-27 PROCEDURE — 83036 HEMOGLOBIN GLYCOSYLATED A1C: CPT | Performed by: STUDENT IN AN ORGANIZED HEALTH CARE EDUCATION/TRAINING PROGRAM

## 2025-02-27 PROCEDURE — 99215 OFFICE O/P EST HI 40 MIN: CPT | Performed by: STUDENT IN AN ORGANIZED HEALTH CARE EDUCATION/TRAINING PROGRAM

## 2025-02-27 RX ORDER — GABAPENTIN 100 MG/1
100 CAPSULE ORAL
Qty: 30 CAPSULE | Refills: 1 | Status: SHIPPED | OUTPATIENT
Start: 2025-02-27

## 2025-02-27 NOTE — ASSESSMENT & PLAN NOTE
Lab Results   Component Value Date    HGBA1C 6.3 02/27/2025     Currently at goal for A1c (<7%, <8% if >80), reviewed with patient  Continue current regimen of diet control  Consider low dose Ace-i/arb for renal protection  NOT on statin, continue/consider adding            repeat lipids q3yrs, pending  NOT following optho, iris completed in office today  NOT following podiatry, next foot exam due asap  yearly micro albumin creatinine ratio is NOT UTD, due asap  Carb controlled diet, discussed healthy lifestyle modifications  DM nutrition referral      Orders:    POCT hemoglobin A1c    IRIS Diabetic eye exam    Comprehensive metabolic panel; Future    CBC and differential; Future    Albumin / creatinine urine ratio; Future    Ambulatory referral to Diabetic Education; Future    Ambulatory referral to Ophthalmology; Future

## 2025-02-27 NOTE — ASSESSMENT & PLAN NOTE
NEW DX    Ddx: sciatica vs lumbar radiculopathy vs pirifomis syndrome vs trochanteric bursitis vs less likely avascular necrosis vs fracture vs infection vs rheumatologic condition    Deferring Xrays as no trauma to area and generally nerve pain  Encouraged PT, patient given referral   declined free clinic information  Encouraged exercise as tolerated, particularly walking  S/p robaxin  + naprosyn   Discussed side effects, alternatives, risks, and benefits or prolonged NSAID use, which patient able to teach back.   Pt elected for voltaren QID, which is reasonable  Recheck BMP for any SHIELA    Encouraged plenty of fluids and ice pack/heat packs in addition to peppermint oil for muscle relaxation  No reg flag symptoms at this time  Discussed red flag symptoms of bowel and bladder incontinence, which would require immediate ED visit    Consider MRI if no improvement 6 weeks with conservative treatment  Med rec completed  Continue scheduled tylenol.  Continue DM control to minimize baseline inflammation    Consider Arthritis workup if refractory   May consider sacroiliitis HLA b27 testing in future based on imaging or clinical course   May also consider short course of steroids  consider referral to comprehensive spine    Orders:    gabapentin (NEURONTIN) 100 mg capsule; Take 1 capsule (100 mg total) by mouth daily at bedtime    Ambulatory Referral to Physical Therapy; Future

## 2025-02-27 NOTE — PATIENT INSTRUCTIONS
"Patient Education     Routine physical for adults   The Basics   Written by the doctors and editors at [facility]   What is a physical? -- A physical is a routine visit, or \"check-up,\" with your doctor. You might also hear it called a \"wellness visit\" or \"preventive visit.\"  During each visit, the doctor will:   Ask about your physical and mental health   Ask about your habits, behaviors, and lifestyle   Do an exam   Give you vaccines if needed   Talk to you about any medicines you take   Give advice about your health   Answer your questions  Getting regular check-ups is an important part of taking care of your health. It can help your doctor find and treat any problems you have. But it's also important for preventing health problems.  A routine physical is different from a \"sick visit.\" A sick visit is when you see a doctor because of a health concern or problem. Since physicals are scheduled ahead of time, you can think about what you want to ask the doctor.  How often should I get a physical? -- It depends on your age and health. In general, for people age 21 years and older:   If you are younger than 50 years, you might be able to get a physical every 3 years.   If you are 50 years or older, your doctor might recommend a physical every year.  If you have an ongoing health condition, like diabetes or high blood pressure, your doctor will probably want to see you more often.  What happens during a physical? -- In general, each visit will include:   Physical exam - The doctor or nurse will check your height, weight, heart rate, and blood pressure. They will also look at your eyes and ears. They will ask about how you are feeling and whether you have any symptoms that bother you.   Medicines - It's a good idea to bring a list of all the medicines you take to each doctor visit. Your doctor will talk to you about your medicines and answer any questions. Tell them if you are having any side effects that bother you. You " "should also tell them if you are having trouble paying for any of your medicines.   Habits and behaviors - This includes:   Your diet   Your exercise habits   Whether you smoke, drink alcohol, or use drugs   Whether you are sexually active   Whether you feel safe at home  Your doctor will talk to you about things you can do to improve your health and lower your risk of health problems. They will also offer help and support. For example, if you want to quit smoking, they can give you advice and might prescribe medicines. If you want to improve your diet or get more physical activity, they can help you with this, too.   Lab tests, if needed - The tests you get will depend on your age and situation. For example, your doctor might want to check your:   Cholesterol   Blood sugar   Iron level   Vaccines - The recommended vaccines will depend on your age, health, and what vaccines you already had. Vaccines are very important because they can prevent certain serious or deadly infections.   Discussion of screening - \"Screening\" means checking for diseases or other health problems before they cause symptoms. Your doctor can recommend screening based on your age, risk, and preferences. This might include tests to check for:   Cancer, such as breast, prostate, cervical, ovarian, colorectal, prostate, lung, or skin cancer   Sexually transmitted infections, such as chlamydia and gonorrhea   Mental health conditions like depression and anxiety  Your doctor will talk to you about the different types of screening tests. They can help you decide which screenings to have. They can also explain what the results might mean.   Answering questions - The physical is a good time to ask the doctor or nurse questions about your health. If needed, they can refer you to other doctors or specialists, too.  Adults older than 65 years often need other care, too. As you get older, your doctor will talk to you about:   How to prevent falling at " home   Hearing or vision tests   Memory testing   How to take your medicines safely   Making sure that you have the help and support you need at home  All topics are updated as new evidence becomes available and our peer review process is complete.  This topic retrieved from [employer] on: May 02, 2024.  Topic 648334 Version 1.0  Release: 32.4.3 - C32.122  © 2024 UpToDate, Inc. and/or its affiliates. All rights reserved.  Consumer Information Use and Disclaimer   Disclaimer: This generalized information is a limited summary of diagnosis, treatment, and/or medication information. It is not meant to be comprehensive and should be used as a tool to help the user understand and/or assess potential diagnostic and treatment options. It does NOT include all information about conditions, treatments, medications, side effects, or risks that may apply to a specific patient. It is not intended to be medical advice or a substitute for the medical advice, diagnosis, or treatment of a health care provider based on the health care provider's examination and assessment of a patient's specific and unique circumstances. Patients must speak with a health care provider for complete information about their health, medical questions, and treatment options, including any risks or benefits regarding use of medications. This information does not endorse any treatments or medications as safe, effective, or approved for treating a specific patient. UpToDate, Inc. and its affiliates disclaim any warranty or liability relating to this information or the use thereof.The use of this information is governed by the Terms of Use, available at https://www.woltersMinglyuwer.com/en/know/clinical-effectiveness-terms. 2024© UpToDate, Inc. and its affiliates and/or licensors. All rights reserved.  Copyright   © 2024 UpToDate, Inc. and/or its affiliates. All rights reserved.

## 2025-02-27 NOTE — PROGRESS NOTES
Name: Hue Short      : 1955      MRN: [id number]  Encounter Provider: Dory Man DO  Encounter Date: 2025   Encounter department: [facility]    Assessment & Plan  Diabetes mellitus type 2 in nonobese (HCC)    Lab Results   Component Value Date    HGBA1C 6.3 2025     Currently at goal for A1c (<7%, <8% if >80), reviewed with patient  Continue current regimen of diet control  Consider low dose Ace-i/arb for renal protection  NOT on statin, continue/consider adding            repeat lipids q3yrs, pending  NOT following optho, iris completed in office today  NOT following podiatry, next foot exam due asap  yearly micro albumin creatinine ratio is NOT UTD, due asap  Carb controlled diet, discussed healthy lifestyle modifications  DM nutrition referral      Orders:    POCT hemoglobin A1c    IRIS Diabetic eye exam    Comprehensive metabolic panel; Future    CBC and differential; Future    Albumin / creatinine urine ratio; Future    Ambulatory referral to Diabetic Education; Future    Ambulatory referral to Ophthalmology; Future    Encounter for screening mammogram for breast cancer    Orders:    Mammo screening bilateral w 3d and cad; Future    Screening for cervical cancer    Orders:    Ambulatory referral to Obstetrics / Gynecology; Future    Asymptomatic postmenopausal state    Orders:    DXA bone density spine hip and pelvis; Future    Sciatica of right side  NEW DX    Ddx: sciatica vs lumbar radiculopathy vs pirifomis syndrome vs trochanteric bursitis vs less likely avascular necrosis vs fracture vs infection vs rheumatologic condition    Deferring Xrays as no trauma to area and generally nerve pain  Encouraged PT, patient given referral   declined free clinic information  Encouraged exercise as tolerated, particularly walking  S/p robaxin  + naprosyn   Discussed side effects, alternatives, risks, and benefits or prolonged NSAID use, which patient able to  teach back.   Pt elected for voltaren QID, which is reasonable  Recheck BMP for any SHIELA    Encouraged plenty of fluids and ice pack/heat packs in addition to peppermint oil for muscle relaxation  No reg flag symptoms at this time  Discussed red flag symptoms of bowel and bladder incontinence, which would require immediate ED visit    Consider MRI if no improvement 6 weeks with conservative treatment  Med rec completed  Continue scheduled tylenol.  Continue DM control to minimize baseline inflammation    Consider Arthritis workup if refractory   May consider sacroiliitis HLA b27 testing in future based on imaging or clinical course   May also consider short course of steroids  consider referral to comprehensive spine    Orders:    gabapentin (NEURONTIN) 100 mg capsule; Take 1 capsule (100 mg total) by mouth daily at bedtime    Ambulatory Referral to Physical Therapy; Future         History of Present Illness     69 year old female with PMHX asthma, DM2, HLD, lumbar radiculopathy vs piriformis syndrome who presents today for same day with complaints of continued back pain and sciatica symptoms.    Last office visit, sept 2024, given lidocaine and robaxin. She did not use the lidocaine as cost prohibitive, but did use robaxin and naprosyn with much improvement, completely taking away pain, then given refill of robaxin only, she took it TID without much improvement from 10/10 to 7/10. Did fall off while trying to mount horse when child, can't recall much details other than not falling on currently affected side.    Sitting all day, sitting, driving for door dash.  At worst, 10/10, when getting up, especially when arising from low position, pain radiates to knee. Worse in morning, and because so bad this morning, nearly in tears, thus asked for appt to be seen.    Used to be able to go to gym, walking and lifting light weights, but since pain started, has not been able to go      Review of Systems   Constitutional:   Negative for fatigue and fever.   Respiratory:  Negative for shortness of breath.    Cardiovascular:  Negative for chest pain and palpitations.   Gastrointestinal:  Negative for constipation, diarrhea, nausea and vomiting.   Endocrine: Negative for polydipsia, polyphagia and polyuria.   Genitourinary:  Negative for difficulty urinating, dysuria, frequency and hematuria.   Musculoskeletal:  Positive for back pain and gait problem.   Neurological:  Negative for dizziness, light-headedness, numbness and headaches.   Psychiatric/Behavioral:  Positive for dysphoric mood (2/2 pain) and sleep disturbance. The patient is not nervous/anxious.      Past Medical History:   Diagnosis Date    Asthma     Back pain     Bowel incontinence     Chronic pain     Diabetes mellitus (HCC)     Diverticulosis of large intestine 02/06/2019    Dx by colonoscopy in 2017. Due for repeat colonoscopy in 2022.       History of breast implant     Psychiatric disorder     panic attacks     Past Surgical History:   Procedure Laterality Date    AUGMENTATION MAMMAPLASTY      patient denies    BREAST SURGERY      lift    AL COLONOSCOPY FLX DX W/COLLJ SPEC WHEN PFRMD N/A 05/11/2017    Procedure: COLONOSCOPY;  Surgeon: Dov Martins MD;  Location: [facility];  Service: Gastroenterology     Family History   Problem Relation Age of Onset    Lung cancer Mother     Asthma Sister      Social History     Tobacco Use    Smoking status: Never    Smokeless tobacco: Never   Vaping Use    Vaping status: Never Used   Substance and Sexual Activity    Alcohol use: No    Drug use: No    Sexual activity: Not Currently     Current Outpatient Medications on File Prior to Visit   Medication Sig    albuterol (Ventolin HFA) 90 mcg/act inhaler Inhale 2 puffs every 6 (six) hours as needed for wheezing    [DISCONTINUED] Lidocaine 4 % PTCH Apply 4 1e11 [employer] topically in the morning    [DISCONTINUED] methocarbamol (ROBAXIN) 500 mg tablet Take 1 tablet (500 mg total) by  mouth 4 (four) times a day    [DISCONTINUED] naproxen (Naprosyn) 500 mg tablet Take 1 tablet (500 mg total) by mouth 2 (two) times a day with meals     No Known Allergies  Immunization History   Administered Date(s) Administered    COVID-19 MODERNA VACC 0.5 ML IM 03/29/2021, 04/30/2021    INFLUENZA 11/23/2013, 05/13/2014, 03/04/2016, 03/16/2018, 01/04/2019, 12/06/2019    Influenza, high dose seasonal 0.7 mL 11/05/2021    Influenza, injectable, quadrivalent, preservative free 0.5 mL 09/18/2020    Pneumococcal Polysaccharide PPV23 01/30/2020    Tdap 04/01/2011, 12/06/2019    Tetanus Immune Globulin 04/01/2011     Objective   /82 (BP Location: Left arm, Patient Position: Sitting, Cuff Size: Standard)   Pulse 82   Temp 98 °F (36.7 °C) (Temporal)   Wt 64.2 kg (141 lb 9.6 oz)   SpO2 97%   BMI 23.56 kg/m²     Physical Exam  Constitutional:       General: She is not in acute distress.     Appearance: She is well-developed.   HENT:      Head: Normocephalic and atraumatic.   Eyes:      General: No scleral icterus.     Conjunctiva/sclera: Conjunctivae normal.   Neck:      Thyroid: No thyromegaly.   Cardiovascular:      Rate and Rhythm: Normal rate and regular rhythm.      Heart sounds: Normal heart sounds. No murmur heard.     No friction rub. No gallop.   Pulmonary:      Effort: Pulmonary effort is normal. No respiratory distress.      Breath sounds: Normal breath sounds. No stridor. No wheezing or rales.   Abdominal:      General: Bowel sounds are normal. There is no distension.      Palpations: Abdomen is soft. There is no mass.      Tenderness: There is no abdominal tenderness. There is no guarding or rebound.   Musculoskeletal:         General: Tenderness (mild to low back palpation b/l but R>L) present. No deformity.      Cervical back: Neck supple.      Right lower leg: No edema.      Left lower leg: No edema.   Skin:     General: Skin is warm.      Capillary Refill: Capillary refill takes less than 2  seconds.      Findings: No erythema or rash.   Neurological:      Mental Status: She is alert and oriented to person, place, and time.   Psychiatric:         Mood and Affect: Mood normal.         Behavior: Behavior normal.         Thought Content: Thought content normal.         Judgment: Judgment normal.           Administrative Statements   I have spent a total time of  minutes in caring for this patient on the day of the visit/encounter including Diagnostic results, Prognosis, Risks and benefits of tx options, Instructions for management, Patient and family education, Importance of tx compliance, Risk factor reductions, Impressions, Counseling / Coordination of care, Documenting in the medical record, Reviewing/placing orders in the medical record (including tests, medications, and/or procedures), Obtaining or reviewing history  , and Communicating with other healthcare professionals .

## 2025-03-01 ENCOUNTER — HOSPITAL ENCOUNTER (EMERGENCY)
Facility: HOSPITAL | Age: 70
Discharge: HOME | End: 2025-03-01
Attending: EMERGENCY MEDICINE | Admitting: EMERGENCY MEDICINE

## 2025-03-01 VITALS
HEART RATE: 83 BPM | DIASTOLIC BLOOD PRESSURE: 104 MMHG | RESPIRATION RATE: 22 BRPM | SYSTOLIC BLOOD PRESSURE: 164 MMHG | TEMPERATURE: 98.6 F | OXYGEN SATURATION: 95 %

## 2025-03-01 DIAGNOSIS — M79.604 LOW BACK PAIN RADIATING TO RIGHT LOWER EXTREMITY: Primary | ICD-10-CM

## 2025-03-01 DIAGNOSIS — M54.50 LOW BACK PAIN RADIATING TO RIGHT LOWER EXTREMITY: Primary | ICD-10-CM

## 2025-03-01 PROCEDURE — 99284 EMERGENCY DEPT VISIT MOD MDM: CPT | Performed by: EMERGENCY MEDICINE

## 2025-03-01 PROCEDURE — 99283 EMERGENCY DEPT VISIT LOW MDM: CPT

## 2025-03-01 PROCEDURE — 96372 THER/PROPH/DIAG INJ SC/IM: CPT

## 2025-03-01 RX ORDER — ACETAMINOPHEN 325 MG/1
650 TABLET ORAL ONCE
Status: COMPLETED | OUTPATIENT
Start: 2025-03-01 | End: 2025-03-01

## 2025-03-01 RX ORDER — DIAZEPAM 5 MG/1
5 TABLET ORAL ONCE
Status: COMPLETED | OUTPATIENT
Start: 2025-03-01 | End: 2025-03-01

## 2025-03-01 RX ORDER — KETOROLAC TROMETHAMINE 30 MG/ML
15 INJECTION, SOLUTION INTRAMUSCULAR; INTRAVENOUS ONCE
Status: COMPLETED | OUTPATIENT
Start: 2025-03-01 | End: 2025-03-01

## 2025-03-01 RX ORDER — GABAPENTIN 300 MG/1
300 CAPSULE ORAL ONCE
Status: COMPLETED | OUTPATIENT
Start: 2025-03-01 | End: 2025-03-01

## 2025-03-01 RX ADMIN — DIAZEPAM 5 MG: 5 TABLET ORAL at 01:20

## 2025-03-01 RX ADMIN — GABAPENTIN 300 MG: 300 CAPSULE ORAL at 01:20

## 2025-03-01 RX ADMIN — ACETAMINOPHEN 650 MG: 325 TABLET, FILM COATED ORAL at 01:20

## 2025-03-01 RX ADMIN — KETOROLAC TROMETHAMINE 15 MG: 30 INJECTION, SOLUTION INTRAMUSCULAR; INTRAVENOUS at 01:20

## 2025-03-01 NOTE — ED ATTENDING ATTESTATION
3/1/2025  I, Jasmeet Mark MD, saw and evaluated the patient. I have discussed the patient with the resident/non-physician practitioner and agree with the resident's/non-physician practitioner's findings, Plan of Care, and MDM as documented in the resident's/non-physician practitioner's note, except where noted. All available labs and Radiology studies were reviewed.  I was present for key portions of any procedure(s) performed by the resident/non-physician practitioner and I was immediately available to provide assistance.       At this point I agree with the current assessment done in the Emergency Department.  I have conducted an independent evaluation of this patient a history and physical is as follows:      Final Diagnosis:  1. Low back pain radiating to right lower extremity      Chief Complaint   Patient presents with    Leg Pain     Pt states she has R leg pain that started on Monday and has been worsening.            A:  -69-year-old female who presents with back and leg pain.      P:  -Acute on chronic low back pain with right-sided sciatica.  No red flag signs or symptoms.  Will treat symptomatically.      H:    69-year-old female presents with right leg pain.  Third on Monday.  Starts in the right low back and radiates down her right leg.  Getting worse since onset.  No inciting event or injury.  Does have a history of right-sided sciatica.      PMH:  Past Medical History:   Diagnosis Date    Asthma     Back pain     Bowel incontinence     Chronic pain     Diabetes mellitus (HCC)     Diverticulosis of large intestine 02/06/2019    Dx by colonoscopy in 2017. Due for repeat colonoscopy in 2022.       History of breast implant     Psychiatric disorder     panic attacks       PSH:  Past Surgical History:   Procedure Laterality Date    AUGMENTATION MAMMAPLASTY      patient denies    BREAST SURGERY      lift    DE COLONOSCOPY FLX DX W/COLLJ SPEC WHEN PFRMD N/A 05/11/2017    Procedure: COLONOSCOPY;   Surgeon: Dov Martins MD;  Location: [facility];  Service: Gastroenterology         PE:   Vitals:    03/01/25 0017   BP: (!) 164/104   BP Location: Left arm   Pulse: 83   Resp: 22   Temp: 98.6 °F (37 °C)   SpO2: 95%         Constitutional: Vital signs are normal. She appears well-developed. She is cooperative. No distress.   Cardiovascular: Normal rate, regular rhythm.  Pulmonary/Chest: Effort normal.   Abdominal: Soft. Normal appearance.   Neurological: She is alert.   Skin: Skin is warm, dry and intact.           - 13 point ROS was performed and all are normal unless stated in the history above.   - Nursing note reviewed. Vitals reviewed.   - Orders placed by myself and/or advanced practitioner / resident.    - Previous chart was reviewed  - No language barrier.   - History obtained from patient.   - There are no limitations to the history obtained. Reasons ROS could not be obtained:  N/A         Medications   diazepam (VALIUM) tablet 5 mg (5 mg Oral Given 3/1/25 0120)   ketorolac (TORADOL) injection 15 mg (15 mg Intramuscular Given 3/1/25 0120)   acetaminophen (TYLENOL) tablet 650 mg (650 mg Oral Given 3/1/25 0120)   gabapentin (NEURONTIN) capsule 300 mg (300 mg Oral Given 3/1/25 0120)     No orders to display     Orders Placed This Encounter   Procedures    Ambulatory Referral to [facility]     Labs Reviewed - No data to display  Time reflects when diagnosis was documented in both MDM as applicable and the Disposition within this note       Time User Action Codes Description Comment    3/1/2025  2:25 AM Karen Saucedo [M54.50,  M79.604] Low back pain radiating to right lower extremity           ED Disposition       ED Disposition   Discharge    Condition   Stable    Date/Time   Sat Mar 1, 2025  2:25 AM    Comment   Hue Short discharge to home/self care.                   Follow-up Information       Follow up With Specialties Details Why Contact Info Additional Information    [facility] Physical Therapy Schedule an appointment as soon as possible for a visit   [phone]    Dory Man DO Internal Medicine Schedule an appointment as soon as possible for a visit   [address] [place] [address]   [address]  [place] [address] [phone]       [facility]  Call   [phone]             Discharge Medication List as of 3/1/2025  2:26 AM        CONTINUE these medications which have NOT CHANGED    Details   albuterol (Ventolin HFA) 90 mcg/act inhaler Inhale 2 puffs every 6 (six) hours as needed for wheezing, Starting Sun 7/14/2024, Print      gabapentin (NEURONTIN) 100 mg capsule Take 1 capsule (100 mg total) by mouth daily at bedtime, Starting u 2/27/2025, Normal             Prior to Admission Medications   Prescriptions Last Dose Informant Patient Reported? Taking?   albuterol (Ventolin HFA) 90 mcg/act inhaler   No No   Sig: Inhale 2 puffs every 6 (six) hours as needed for wheezing   gabapentin (NEURONTIN) 100 mg capsule   No No   Sig: Take 1 capsule (100 mg total) by mouth daily at bedtime      Facility-Administered Medications: None       Portions of the record may have been created with voice recognition software. Occasional wrong word or \"sound a like\" substitutions may have occurred due to the inherent limitations of voice recognition software. Read the chart carefully and recognize, using context, where substitutions have occurred.       ED Course         Critical Care Time  Procedures      "

## 2025-03-01 NOTE — DISCHARGE INSTRUCTIONS
Please follow up with your PCP and [facility].    Please return to the Emergency Department if you experience worsening of your current symptoms, numbness, weakness, inability to go to the bathroom or having accidents, or any new/other concerning symptoms.

## 2025-03-01 NOTE — ED PROVIDER NOTES
Time reflects when diagnosis was documented in both MDM as applicable and the Disposition within this note       Time User Action Codes Description Comment    3/1/2025  2:25 AM Karen Saucedo [M54.50,  M79.604] Low back pain radiating to right lower extremity           ED Disposition       ED Disposition   Discharge    Condition   Stable    Date/Time   Sat Mar 1, 2025  2:25 AM    Comment   Hue Short discharge to home/self care.                   Assessment & Plan       Medical Decision Making  ASSESSMENT: Patient is a 69 y.o. female who presents for evaluation of back pain, previously seen by PCP with negative outpatient imaging.  DDX includes but not limited to: musculoskeletal back pain. Possible radiculopathy versus sciatica but less likely given symptoms and exam/negative straight leg raise test. Doubt fracture given lack of trauma or bony tenderness.  PLAN: Will treat symptomatically. Reevaluate. Recommend follow up with PCP/comprehensive spine center.    Patient reevaluated. Denies any new or worsening complaints or concerns at this time. Patient is sleeping on reexamination. States she feels significantly improved and she can sit and lay comfortably, she still has some pain with ambulation but was able to walk to and go to the bathroom while being observed in the ED. Discussed evaluation with findings and plan with patient and son at bedside. Advised on need for outpatient follow up. Given return precautions verbally and in discharge instructions, confirmed with teach back method. All questions answered prior to discharge with verbal understanding/agreement with plan expressed. Son with drive patient home.    Problems Addressed:  Low back pain radiating to right lower extremity: acute illness or injury    Risk  OTC drugs.  Prescription drug management.             Medications   diazepam (VALIUM) tablet 5 mg (5 mg Oral Given 3/1/25 0120)   ketorolac (TORADOL) injection 15 mg (15 mg Intramuscular Given  "3/1/25 0120)   acetaminophen (TYLENOL) tablet 650 mg (650 mg Oral Given 3/1/25 0120)   gabapentin (NEURONTIN) capsule 300 mg (300 mg Oral Given 3/1/25 0120)       ED Risk Strat Scores                                                History of Present Illness       Chief Complaint   Patient presents with    Leg Pain     Pt states she has R leg pain that started on Monday and has been worsening.        Past Medical History:   Diagnosis Date    Asthma     Back pain     Bowel incontinence     Chronic pain     Diabetes mellitus (HCC)     Diverticulosis of large intestine 02/06/2019    Dx by colonoscopy in 2017. Due for repeat colonoscopy in 2022.       History of breast implant     Psychiatric disorder     panic attacks      Past Surgical History:   Procedure Laterality Date    AUGMENTATION MAMMAPLASTY      patient denies    BREAST SURGERY      lift    AK COLONOSCOPY FLX DX W/COLLJ SPEC WHEN PFRMD N/A 05/11/2017    Procedure: COLONOSCOPY;  Surgeon: Dov Martins MD;  Location: [facility];  Service: Gastroenterology      Family History   Problem Relation Age of Onset    Lung cancer Mother     Asthma Sister       Social History     Tobacco Use    Smoking status: Never    Smokeless tobacco: Never   Vaping Use    Vaping status: Never Used   Substance Use Topics    Alcohol use: No    Drug use: No      E-Cigarette/Vaping    E-Cigarette Use Never User       E-Cigarette/Vaping Substances    Nicotine No     THC No     CBD No     Flavoring No     Other No     Unknown No       I have reviewed and agree with the history as documented.     HPI  68 yo female with PMH of asthma, T2DM, HLD, prior back pain, presents to the ED for evaluation of right leg pain starting Monday (5 days ago). Patient endorses pain radiating down her right leg, burning in sensation, worse with laying down, sitting. Patient was evaluated for this at [facility], had xray imaging and was told it showed arthritis, was given a \"shot\" which did not provide her with any " pain relief and discharged home. Per chart review, patient had XR right hip/pelvis 2/28 which showed moderate OA of BL hips. Patient returned to the ED tonight due to worsening pain not controlled with OTC medications. Patient denies sustaining any trauma/recent falls. Denies any associated numbness, weakness, bowel or bladder dysfunction, saddle anesthesia.     Review of Systems   Musculoskeletal:  Positive for back pain.        Right hip/leg pain    All other systems reviewed and negative unless otherwise stated in HPI above.    Objective       ED Triage Vitals [03/01/25 0017]   Temperature Pulse Blood Pressure Respirations SpO2 Patient Position - Orthostatic VS   98.6 °F (37 °C) 83 (!) 164/104 22 95 % Sitting      Temp src Heart Rate Source BP Location FiO2 (%) Pain Score    -- -- Left arm -- 10 - Worst Possible Pain      Vitals      Date and Time Temp Pulse SpO2 Resp BP Pain Score FACES Pain Rating User   03/01/25 0120 -- -- -- -- -- 10 - Worst Possible Pain -- NZ   03/01/25 0017 98.6 °F (37 °C) 83 95 % 22 164/104 10 - Worst Possible Pain -- RG            Physical Exam  Vitals and nursing note reviewed.   Constitutional:       General: She is not in acute distress.     Appearance: Normal appearance. She is well-developed. She is not ill-appearing, toxic-appearing or diaphoretic.   HENT:      Head: Normocephalic and atraumatic.   Eyes:      General: No scleral icterus.     Conjunctiva/sclera: Conjunctivae normal.   Cardiovascular:      Rate and Rhythm: Normal rate.      Pulses: Normal pulses.   Pulmonary:      Effort: Pulmonary effort is normal. No respiratory distress.   Abdominal:      General: Abdomen is flat. There is no distension.   Musculoskeletal:         General: Tenderness (right lower back and right hip) present. No swelling, deformity or signs of injury.      Cervical back: Normal range of motion and neck supple.      Lumbar back: No bony tenderness. Negative right straight leg raise test and negative  left straight leg raise test.   Skin:     General: Skin is warm and dry.      Capillary Refill: Capillary refill takes less than 2 seconds.   Neurological:      General: No focal deficit present.      Mental Status: She is alert.      Sensory: No sensory deficit.      Motor: No weakness.         Results Reviewed       None            No orders to display       Procedures    ED Medication and Procedure Management   Prior to Admission Medications   Prescriptions Last Dose Informant Patient Reported? Taking?   albuterol (Ventolin HFA) 90 mcg/act inhaler   No No   Sig: Inhale 2 puffs every 6 (six) hours as needed for wheezing   gabapentin (NEURONTIN) 100 mg capsule   No No   Sig: Take 1 capsule (100 mg total) by mouth daily at bedtime      Facility-Administered Medications: None     Discharge Medication List as of 3/1/2025  2:26 AM        CONTINUE these medications which have NOT CHANGED    Details   albuterol (Ventolin HFA) 90 mcg/act inhaler Inhale 2 puffs every 6 (six) hours as needed for wheezing, Starting Sun 7/14/2024, Print      gabapentin (NEURONTIN) 100 mg capsule Take 1 capsule (100 mg total) by mouth daily at bedtime, Starting u 2/27/2025, Normal             ED SEPSIS DOCUMENTATION   Time reflects when diagnosis was documented in both MDM as applicable and the Disposition within this note       Time User Action Codes Description Comment    3/1/2025  2:25 AM Karen Saucedo Add [M54.50,  M79.604] Low back pain radiating to right lower extremity                  Karen Saucedo, DO  03/04/25 1058

## 2025-03-10 ENCOUNTER — TELEPHONE (OUTPATIENT)
Facility: HOSPITAL | Age: 70
End: 2025-03-10

## 2025-03-13 ENCOUNTER — HOSPITAL ENCOUNTER (OUTPATIENT)
Dept: RADIOLOGY | Age: 70
Discharge: HOME | End: 2025-03-13
Payer: COMMERCIAL

## 2025-03-13 DIAGNOSIS — Z12.31 ENCOUNTER FOR SCREENING MAMMOGRAM FOR BREAST CANCER: ICD-10-CM

## 2025-03-13 PROCEDURE — 77067 SCR MAMMO BI INCL CAD: CPT

## 2025-03-13 PROCEDURE — 77063 BREAST TOMOSYNTHESIS BI: CPT

## 2025-03-24 LAB
LEFT EYE DIABETIC RETINOPATHY: NORMAL
RIGHT EYE DIABETIC RETINOPATHY: NORMAL

## 2025-03-25 ENCOUNTER — RESULTS FOLLOW-UP (OUTPATIENT)
Dept: INTERNAL MEDICINE CLINIC | Facility: CLINIC | Age: 70
End: 2025-03-25

## 2025-03-25 ENCOUNTER — OFFICE VISIT (OUTPATIENT)
Dept: INTERNAL MEDICINE CLINIC | Facility: CLINIC | Age: 70
End: 2025-03-25

## 2025-03-25 VITALS
HEART RATE: 92 BPM | DIASTOLIC BLOOD PRESSURE: 87 MMHG | TEMPERATURE: 97.7 F | WEIGHT: 139.8 LBS | SYSTOLIC BLOOD PRESSURE: 155 MMHG | OXYGEN SATURATION: 99 % | BODY MASS INDEX: 23.26 KG/M2

## 2025-03-25 DIAGNOSIS — Z23 ENCOUNTER FOR IMMUNIZATION: ICD-10-CM

## 2025-03-25 DIAGNOSIS — M54.31 SCIATICA OF RIGHT SIDE: Primary | ICD-10-CM

## 2025-03-25 RX ORDER — KETOROLAC TROMETHAMINE 30 MG/ML
15 INJECTION, SOLUTION INTRAMUSCULAR; INTRAVENOUS ONCE
Status: COMPLETED | OUTPATIENT
Start: 2025-03-25 | End: 2025-03-25

## 2025-03-25 RX ORDER — GABAPENTIN 100 MG/1
CAPSULE ORAL
Qty: 90 CAPSULE | Refills: 1 | Status: SHIPPED | OUTPATIENT
Start: 2025-03-25

## 2025-03-25 RX ADMIN — KETOROLAC TROMETHAMINE 15 MG: 30 INJECTION, SOLUTION INTRAMUSCULAR; INTRAVENOUS at 13:39

## 2025-03-25 NOTE — PROGRESS NOTES
Name: Hue Short      : 1955      MRN: [id number]  Encounter Provider: Tamera Hillman MD  Encounter Date: 3/25/2025   Encounter department: [facility]    Assessment & Plan  Encounter for immunization    Orders:    influenza vaccine, high-dose, PF 0.5 mL (Fluzone High Dose)    Sciatica of right side  Patient seen here in the office on  , then was in ED x 2  and 3/1/2025 she had brief period of relief in ED after toradol injection , valium and gabapentin dose , when she went to get up off of ED bed sx returned , she had  remote fall onto her back in  but no injury since , 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 The BMI is above normal. Nutrition recommendations include decreasing portion sizes, encouraging healthy choices of fruits and vegetables, decreasing fast food intake, consuming healthier snacks, limiting drinks that contain sugar and reducing intake of saturated and trans fat. Exercise recommendations include exercising 3-5 times per week. Rationale for BMI follow-up plan is due to patient being overweight or obese.         History of Present Illness     Back Pain  Pertinent negatives include no abdominal pain, chest pain, dysuria, fever, numbness or weakness.    Pt here same day appointment , interpretor # [id number]  present during office visit she has been in ED x 2  , 3/1/2025 ( also 2024 ) with same complaints R low back pain radiating down R leg , most  recent ED visit was given toradol , valium and gabapentin SI joint xray negative , bilateral hip xrays moderate DJD she felt relief then as soon as she stood up from exam table pain was back pain is worse when she is driving , is she lays in  bed with R leg elevated she feels some better , changing position bothers her pain radiates down to mid R calf , - numbness leg not weak urinary and bowel patterns normal   Last night she took OTC med it helps some also taking tylenol and motrin otc back aid   She has been having this pain since October 2024 she had fallen down a flight of steps 2008 + back injury   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   HENT:  Negative for ear pain and sore throat.    Eyes:  Negative for pain and visual disturbance.   Respiratory:  Negative for cough and shortness of breath.    Cardiovascular:  Negative for chest pain and palpitations.   Gastrointestinal:  Negative for abdominal pain, constipation, diarrhea and vomiting.   Genitourinary:  Negative for decreased urine volume, difficulty urinating, dysuria, frequency, hematuria and urgency.   Musculoskeletal:  Positive for back pain. Negative for arthralgias.        R low back pain , R leg pain    Skin:  Negative for color change and rash.   Neurological:  Negative for seizures, syncope, weakness and numbness.   Psychiatric/Behavioral:  Positive for sleep disturbance.    All other systems reviewed and are negative.    Past Medical History:   Diagnosis Date    Asthma     Back pain     Bowel incontinence     Chronic pain     Diabetes mellitus (HCC)     Diverticulosis of large intestine 02/06/2019    Dx by colonoscopy in 2017. Due for repeat colonoscopy in 2022.       History of breast implant     Psychiatric disorder     panic attacks     Past Surgical History:   Procedure Laterality Date    AUGMENTATION  MAMMAPLASTY      patient denies actual implant    she had a bilateral breast lift    BREAST SURGERY      lift    AR COLONOSCOPY FLX DX W/COLLJ SPEC WHEN PFRMD N/A 05/11/2017    Procedure: COLONOSCOPY;  Surgeon: oDv Martins MD;  Location: [facility];  Service: Gastroenterology     Family History   Problem Relation Age of Onset    Lung cancer Mother     Asthma Sister     No Known Problems Sister     No Known Problems Sister     No Known Problems Sister     No Known Problems Daughter     No Known Problems Daughter     No Known Problems Maternal Grandmother     No Known Problems Maternal Grandfather     No Known Problems Paternal Grandmother     No Known Problems Paternal Grandfather     No Known Problems Son     No Known Problems Son     No Known Problems Maternal Aunt     No Known Problems Maternal Aunt     No Known Problems Maternal Aunt     No Known Problems Paternal Aunt     No Known Problems Paternal Aunt     No Known Problems Paternal Aunt     No Known Problems Paternal Aunt      Social History     Tobacco Use    Smoking status: Never    Smokeless tobacco: Never   Vaping Use    Vaping status: Never Used   Substance and Sexual Activity    Alcohol use: No    Drug use: No    Sexual activity: Not Currently     Current Outpatient Medications on File Prior to Visit   Medication Sig    albuterol (Ventolin HFA) 90 mcg/act inhaler Inhale 2 puffs every 6 (six) hours as needed for wheezing    [DISCONTINUED] gabapentin (NEURONTIN) 100 mg capsule Take 1 capsule (100 mg total) by mouth daily at bedtime     No Known Allergies  Immunization History   Administered Date(s) Administered    COVID-19 MODERNA VACC 0.5 ML IM 03/29/2021, 04/30/2021    INFLUENZA 11/23/2013, 05/13/2014, 03/04/2016, 03/16/2018, 01/04/2019, 12/06/2019    Influenza Split High Dose Preservative Free IM 03/25/2025    Influenza, high dose seasonal 0.7 mL 11/05/2021    Influenza, injectable, quadrivalent, preservative free 0.5 mL 09/18/2020    Pneumococcal  Polysaccharide PPV23 01/30/2020    Tdap 04/01/2011, 12/06/2019    Tetanus Immune Globulin 04/01/2011     Objective   /87 (BP Location: Left arm, Patient Position: Sitting, Cuff Size: Large)   Pulse 92   Temp 97.7 °F (36.5 °C) (Temporal)   Wt 63.4 kg (139 lb 12.8 oz)   SpO2 99%   BMI 23.26 kg/m²     Physical Exam  Vitals and nursing note reviewed.   Constitutional:       General: She is not in acute distress.     Appearance: She is well-developed.      Comments: Skin with good color turgor , well hydrated ,no distress noted     HENT:      Head: Normocephalic and atraumatic.      Right Ear: No decreased hearing noted.      Left Ear: No decreased hearing noted.      Mouth/Throat:      Pharynx: Oropharynx is clear.   Eyes:      Conjunctiva/sclera: Conjunctivae normal.   Neck:      Thyroid: No thyromegaly.   Cardiovascular:      Rate and Rhythm: Normal rate and regular rhythm.      Heart sounds: Normal heart sounds. No murmur heard.  Pulmonary:      Effort: Pulmonary effort is normal. No respiratory distress.      Breath sounds: Normal breath sounds.   Abdominal:      Palpations: Abdomen is soft.      Tenderness: There is no abdominal tenderness.   Musculoskeletal:         General: No swelling.      Cervical back: Neck supple.      Lumbar back: No swelling, deformity or tenderness. Decreased range of motion. Negative right straight leg raise test.      Comments: R sciatic area of pain    Lymphadenopathy:      Cervical:      Right cervical: No superficial cervical adenopathy.     Left cervical: No superficial cervical adenopathy.   Skin:     General: Skin is warm and dry.      Capillary Refill: Capillary refill takes less than 2 seconds.   Neurological:      Mental Status: She is alert.      Motor: No weakness or atrophy.      Deep Tendon Reflexes:      Reflex Scores:       Patellar reflexes are 2+ on the right side and 2+ on the left side.       Achilles reflexes are 1+ on the left side.     Comments: Non  focal exam    Psychiatric:         Attention and Perception: Attention normal.         Mood and Affect: Mood normal.         Speech: Speech normal.         Behavior: Behavior normal.

## 2025-03-25 NOTE — ASSESSMENT & PLAN NOTE
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

## 2025-03-27 ENCOUNTER — TELEPHONE (OUTPATIENT)
Dept: INTERNAL MEDICINE CLINIC | Facility: CLINIC | Age: 70
End: 2025-03-27

## 2025-03-28 ENCOUNTER — TELEPHONE (OUTPATIENT)
Facility: HOSPITAL | Age: 70
End: 2025-03-28

## 2025-03-28 NOTE — TELEPHONE ENCOUNTER
Patient called to follow up on refill of Tramadol. Relayed message from office that patient needs appointment. Patient is not happy as she is in pain. States she will try OTC. Did not seem to want to call and make an appointment for opioid contract. Patient hung up

## 2025-03-28 NOTE — TELEPHONE ENCOUNTER
Tramadol is controlled substance and requires signed contract. Please have pt scheduled with any provider to assess need for opioid and potential controlled substance contract

## 2025-03-29 PROBLEM — Z12.4 SCREENING FOR CERVICAL CANCER: Status: RESOLVED | Noted: 2025-02-27 | Resolved: 2025-03-29

## 2025-03-31 ENCOUNTER — PATIENT OUTREACH (OUTPATIENT)
Facility: HOSPITAL | Age: 70
End: 2025-03-31

## 2025-03-31 NOTE — PROGRESS NOTES
"Program details reviewed (Yes/No):Yes    Patient lives in [place]: (Yes/No):Yes    Uninsured/Underinsured(List):Yes    Patient Agreeable to Participation(Yes/No):Yes    Verbal Consent Given (Yes/No):Yes    Adagio Consent form signed \"verba consent\"(Yes/No):Yes    Patient Entered into [facility] (Yes/No):Yes      "

## 2025-04-01 NOTE — TELEPHONE ENCOUNTER
2nd Attempt- Left message to schedule appointment for opioid and potential controlled substance contract

## 2025-04-02 NOTE — TELEPHONE ENCOUNTER
Patient will not be needing to make an appt for opioid and potential controlled substance contract. She states she originally requested Tramadol because she received it from a friend and thought it was making her better and then she called the clinic to get more.     Patient went to the ED because later that she the pain got worse and they gave her an injection, steroids and tramadol. So far she feels like she is 95% better and said she will be making sure she will do the exercises to help never go through this issue again.

## 2025-04-10 ENCOUNTER — ANNUAL EXAM (OUTPATIENT)
Dept: OBGYN CLINIC | Facility: CLINIC | Age: 70
End: 2025-04-10

## 2025-04-10 VITALS
WEIGHT: 137 LBS | DIASTOLIC BLOOD PRESSURE: 88 MMHG | SYSTOLIC BLOOD PRESSURE: 150 MMHG | BODY MASS INDEX: 23.39 KG/M2 | HEART RATE: 87 BPM | HEIGHT: 64 IN | RESPIRATION RATE: 18 BRPM

## 2025-04-10 DIAGNOSIS — Z01.419 WOMEN'S ANNUAL ROUTINE GYNECOLOGICAL EXAMINATION: Primary | ICD-10-CM

## 2025-04-10 DIAGNOSIS — Z12.4 SCREENING FOR CERVICAL CANCER: ICD-10-CM

## 2025-04-10 PROCEDURE — 99397 PER PM REEVAL EST PAT 65+ YR: CPT | Performed by: OBSTETRICS & GYNECOLOGY

## 2025-04-10 RX ORDER — KETOROLAC TROMETHAMINE 10 MG/1
10 TABLET, FILM COATED ORAL EVERY 6 HOURS PRN
COMMUNITY
Start: 2025-03-28

## 2025-04-10 RX ORDER — PREDNISONE 20 MG/1
TABLET ORAL
COMMUNITY
Start: 2025-03-28

## 2025-04-10 RX ORDER — TRAMADOL HYDROCHLORIDE 50 MG/1
50 TABLET ORAL EVERY 6 HOURS PRN
COMMUNITY
Start: 2025-03-28

## 2025-04-10 NOTE — PATIENT INSTRUCTIONS
Call imaging center to [facility] dexa scan: [phone]  Would recommend following up with PCP about blood pressure. Keep Blood pressure log to bring to appointment with PCP

## 2025-04-10 NOTE — PROGRESS NOTES
Name: Hue Short    : 1955      MRN: [id number]  Encounter Provider: Resident JULIETA Bhatt  Encounter Date: 4/10/2025  [facility]    Hue Short is a 69 y.o.  female who presents for annual well woman exam.     :  Assessment & Plan  Women's annual routine gynecological examination  Normal well woman exam   Cervical cancer screening: Current ASCCP Guidelines reviewed . Pap testing no longer indicated. Pelvic exam declined   Breast cancer screening: normal breast exam. Current on breast cancer screening, completed mammogram 3/13/25 with Bi-Rads 1. Recommend annual breast cancer screening.   Colonoscopy : up to date, due in    Elevated BP without dx of htn: BP elevated in office today, pt reports drinking a caffinated beverage prior to appoitment. Denies hx of elevated BP or dx of HTN in the past and states she has never been on medication for HTN. Discussed obtaining BP cuff and keeping BP log to bring to appt with PCP. Recommend patient follow up with PCP concerning blood pressure.   Osteoporosis screening: Dexa scan previously ordered, provided pt with phone number to schedule imaging and encouraged patient to complete.  Discussed with patient no further indication for annual OBGYN visit at this time. She can follow with PCP for routine annual physical as cervical cancer screening is no longer indicated. Discussed if she has any vaginal bleeding she should call OBGYN office for further evaluation.     Screening for cervical cancer    Orders:    Ambulatory referral to Obstetrics / Gynecology        Screening:  Cervical cancer: last pap smear in 3/16/16. Results were normal .  Breast cancer: last mammogram in 3/2025. Results were BiRads 1.  Colon cancer: last colonoscopy in 23. Results were sessile poly measuring smaller than 5 mm in ascending colon. Recommend repeat screening in 7 years   STD screening: no hx of sti  .    Subjective     GYN:  - Prior gynecologic surgeries: none  - Menarche at 16. LMP age 62. Denies any bleeding or spotting since menopause.   - Denies vaginal discharge, labial erythema/lesions, or dyspareunia   - Patient is not currently sexually active  - Contraception: abstinence    OB:   female  Pregnancies were normal     :  Denies dysuria, urinary frequency or urgency   Denies hematuria, flank pain, incontinence     Breast:  Denies breast mass, skin changes, dimpling, reddening, nipple retraction   Denies breast discharge   Denies fmhx of breast, endometrial, or ovarian cancer     General:  Diet: well balanced. Low carb diet, diabetes diet   Exercise: goes to gym lifts weight and cardio 2-3 times a week   Work: Food delivery for work   ETOH use: denies   Tobacco use: denies   Recreational drug use: denies     Review of Systems   Constitutional:  Negative for chills and fever.   HENT:  Negative for rhinorrhea and sore throat.    Eyes:  Negative for visual disturbance.   Respiratory:  Negative for cough and shortness of breath.    Cardiovascular:  Negative for chest pain and palpitations.   Gastrointestinal:  Negative for abdominal pain and nausea.   Genitourinary:  Negative for difficulty urinating and dysuria.   Skin:  Negative for rash.   Allergic/Immunologic: Negative for environmental allergies and food allergies.   Neurological:  Negative for dizziness and headaches.       Objective   Vitals:    04/10/25 1114   BP: 150/88   Pulse: 87   Resp: 18         Physical Exam   Physical Exam  Exam conducted with a chaperone present.   Constitutional:       General: She is not in acute distress.     Appearance: Normal appearance.   HENT:      Head: Normocephalic and atraumatic.   Cardiovascular:      Rate and Rhythm: Normal rate and regular rhythm.      Pulses: Normal pulses.      Heart sounds: No murmur heard.  Pulmonary:      Effort: Pulmonary effort is normal. No respiratory distress.      Breath  sounds: Normal breath sounds.   Chest:   Breasts:     Right: Normal. No swelling, inverted nipple, mass, nipple discharge, skin change or tenderness.      Left: Normal. No swelling, inverted nipple, mass, nipple discharge, skin change or tenderness.   Abdominal:      General: Bowel sounds are normal.      Palpations: Abdomen is soft.      Tenderness: There is no abdominal tenderness.   Musculoskeletal:         General: Normal range of motion.      Cervical back: Normal range of motion.      Right lower leg: No edema.      Left lower leg: No edema.   Skin:     General: Skin is warm.   Neurological:      General: No focal deficit present.      Mental Status: She is alert and oriented to person, place, and time.               Karena Carlos MD  PGY-3, [facility]  04/10/25, 11:30 AM

## 2025-05-26 PROBLEM — E78.2 MIXED HYPERLIPIDEMIA: Status: ACTIVE | Noted: 2019-02-19

## 2025-05-26 PROBLEM — Z00.00 ANNUAL PHYSICAL EXAM: Status: ACTIVE | Noted: 2025-05-26

## 2025-05-27 ENCOUNTER — TELEPHONE (OUTPATIENT)
Dept: INTERNAL MEDICINE CLINIC | Facility: CLINIC | Age: 70
End: 2025-05-27

## 2025-06-04 ENCOUNTER — TELEPHONE (OUTPATIENT)
Dept: INTERNAL MEDICINE CLINIC | Facility: CLINIC | Age: 70
End: 2025-06-04

## 2025-06-04 NOTE — TELEPHONE ENCOUNTER
Left detailed VM advising patient she has outstanding bloodwork that needs to be completed and to call with any questions.

## 2025-06-19 ENCOUNTER — OFFICE VISIT (OUTPATIENT)
Dept: INTERNAL MEDICINE CLINIC | Facility: CLINIC | Age: 70
End: 2025-06-19

## 2025-06-19 VITALS
BODY MASS INDEX: 25.14 KG/M2 | OXYGEN SATURATION: 96 % | DIASTOLIC BLOOD PRESSURE: 87 MMHG | HEART RATE: 72 BPM | TEMPERATURE: 97.3 F | SYSTOLIC BLOOD PRESSURE: 163 MMHG | WEIGHT: 144.2 LBS

## 2025-06-19 DIAGNOSIS — M54.31 SCIATICA OF RIGHT SIDE: ICD-10-CM

## 2025-06-19 DIAGNOSIS — M54.50 ACUTE LEFT-SIDED LOW BACK PAIN WITHOUT SCIATICA: Primary | ICD-10-CM

## 2025-06-19 PROCEDURE — 99213 OFFICE O/P EST LOW 20 MIN: CPT | Performed by: INTERNAL MEDICINE

## 2025-06-19 RX ORDER — GABAPENTIN 100 MG/1
CAPSULE ORAL
Qty: 90 CAPSULE | Refills: 1 | Status: SHIPPED | OUTPATIENT
Start: 2025-06-19

## 2025-06-20 PROBLEM — M54.50 ACUTE LEFT-SIDED LOW BACK PAIN WITHOUT SCIATICA: Status: ACTIVE | Noted: 2025-06-20

## 2025-06-20 NOTE — ASSESSMENT & PLAN NOTE
Patient presents with sharp left sided low back pain after lifting in the gym. The pain does not radiate down her legs and does not feel like her sciatica. Pain is likely MSK related with low suspicion of sciatica given her clinical picture. Advised pt to rest, use OTC ibuprofen, heating pad, stretches, massage the area, and physical therapy. Provided pt with information about [facility].   Orders:    Ambulatory Referral to Physical Therapy; Future

## 2025-06-20 NOTE — PROGRESS NOTES
Name: Hue Short      : 1955      MRN: [id number]  Encounter Provider: Patria Porter MD  Encounter Date: 2025   Encounter department: [facility] [place]  :  Assessment & Plan  Acute left-sided low back pain without sciatica  Patient presents with sharp left sided low back pain after lifting in the gym. The pain does not radiate down her legs and does not feel like her sciatica. Pain is likely MSK related with low suspicion of sciatica given her clinical picture. Advised pt to rest, use OTC ibuprofen, heating pad, stretches, massage the area, and physical therapy. Provided pt with information about [facility] PT clinic.   Orders:    Ambulatory Referral to Physical Therapy; Future    Sciatica of right side    Orders:    Ambulatory Referral to Physical Therapy; Future    gabapentin (NEURONTIN) 100 mg capsule; 1 po am , pm and hs if ineffective 2 po am , pm and hs for back leg pain           History of Present Illness   This is a 69 year old female with PMH of right sided sciatica who presents to the clinic as a same-day appointment for acute left sided back pain. Pain started 1 week ago after she lifted heavier weights than usual at the gym. She describes the pain as sharp and only occurs when she rotates her torso and when she sleeps on that side; she denies pain at rest. She denies radiation down her legs and states that this pain feels different compared to her sciatica. She denies dysuria, hematuria, radiation to her groin, and prior hx of nephrolithiasis. She has tried ibuprofen 800 mg, gabapentin 100 mg, and tramadol 50 mg with minimal relief in symptoms.       Review of Systems   Constitutional:  Negative for chills and fever.   HENT:  Negative for ear pain and sore throat.    Eyes:  Negative for pain and visual disturbance.   Respiratory:  Negative for cough and shortness of breath.    Cardiovascular:  Negative for chest pain and palpitations.   Gastrointestinal:  Negative  for abdominal pain and vomiting.   Genitourinary:  Negative for dysuria and hematuria.   Musculoskeletal:  Positive for back pain. Negative for arthralgias and gait problem.   Skin:  Negative for color change and rash.   Neurological:  Negative for seizures and syncope.   All other systems reviewed and are negative.      Objective   /87 (BP Location: Right arm, Patient Position: Sitting, Cuff Size: Adult)   Pulse 72   Temp (!) 97.3 °F (36.3 °C) (Temporal)   Wt 65.4 kg (144 lb 3.2 oz)   SpO2 96%   BMI 25.14 kg/m²      Physical Exam  Constitutional:       General: She is not in acute distress.  HENT:      Head: Normocephalic and atraumatic.     Eyes:      Conjunctiva/sclera: Conjunctivae normal.       Cardiovascular:      Rate and Rhythm: Normal rate and regular rhythm.      Pulses: Normal pulses.      Heart sounds: S1 normal and S2 normal. No murmur heard.     No friction rub. No gallop.   Pulmonary:      Breath sounds: Normal breath sounds. No wheezing, rhonchi or rales.   Abdominal:      General: Bowel sounds are normal. There is no distension.      Palpations: Abdomen is soft.      Tenderness: There is no abdominal tenderness. There is no right CVA tenderness or left CVA tenderness.     Musculoskeletal:      Lumbar back: No swelling or tenderness. Decreased range of motion (with rotation). Negative right straight leg raise test and negative left straight leg raise test.      Right lower leg: No edema.      Left lower leg: No edema.     Skin:     General: Skin is warm and dry.     Neurological:      General: No focal deficit present.      Mental Status: She is alert and oriented to person, place, and time.

## 2025-06-20 NOTE — ASSESSMENT & PLAN NOTE
Orders:    Ambulatory Referral to Physical Therapy; Future    gabapentin (NEURONTIN) 100 mg capsule; 1 po am , pm and hs if ineffective 2 po am , pm and hs for back leg pain

## 2025-06-23 ENCOUNTER — APPOINTMENT (OUTPATIENT)
Dept: LAB | Facility: CLINIC | Age: 70
End: 2025-06-23

## 2025-06-23 DIAGNOSIS — E11.9 DIABETES MELLITUS TYPE 2 IN NONOBESE (HCC): ICD-10-CM

## 2025-06-23 DIAGNOSIS — F32.A DEPRESSION, UNSPECIFIED DEPRESSION TYPE: ICD-10-CM

## 2025-06-23 LAB
25(OH)D3 SERPL-MCNC: 36.2 NG/ML (ref 30–100)
ALBUMIN SERPL BCG-MCNC: 4.1 G/DL (ref 3.5–5)
ALP SERPL-CCNC: 83 U/L (ref 34–104)
ALT SERPL W P-5'-P-CCNC: 11 U/L (ref 7–52)
ANION GAP SERPL CALCULATED.3IONS-SCNC: 9 MMOL/L (ref 4–13)
AST SERPL W P-5'-P-CCNC: 13 U/L (ref 13–39)
BASOPHILS # BLD AUTO: 0.03 THOUSANDS/ÂΜL (ref 0–0.1)
BASOPHILS NFR BLD AUTO: 1 % (ref 0–1)
BILIRUB SERPL-MCNC: 0.41 MG/DL (ref 0.2–1)
BUN SERPL-MCNC: 19 MG/DL (ref 5–25)
CALCIUM SERPL-MCNC: 8.9 MG/DL (ref 8.4–10.2)
CHLORIDE SERPL-SCNC: 107 MMOL/L (ref 96–108)
CHOLEST SERPL-MCNC: 241 MG/DL (ref ?–200)
CO2 SERPL-SCNC: 24 MMOL/L (ref 21–32)
CREAT SERPL-MCNC: 0.77 MG/DL (ref 0.6–1.3)
CREAT UR-MCNC: 107.5 MG/DL
EOSINOPHIL # BLD AUTO: 0.31 THOUSAND/ÂΜL (ref 0–0.61)
EOSINOPHIL NFR BLD AUTO: 5 % (ref 0–6)
ERYTHROCYTE [DISTWIDTH] IN BLOOD BY AUTOMATED COUNT: 12.7 % (ref 11.6–15.1)
GFR SERPL CREATININE-BSD FRML MDRD: 79 ML/MIN/1.73SQ M
GLUCOSE P FAST SERPL-MCNC: 121 MG/DL (ref 65–99)
HCT VFR BLD AUTO: 42.9 % (ref 34.8–46.1)
HDLC SERPL-MCNC: 52 MG/DL
HGB BLD-MCNC: 13.7 G/DL (ref 11.5–15.4)
IMM GRANULOCYTES # BLD AUTO: 0.01 THOUSAND/UL (ref 0–0.2)
IMM GRANULOCYTES NFR BLD AUTO: 0 % (ref 0–2)
LDLC SERPL CALC-MCNC: 157 MG/DL (ref 0–100)
LYMPHOCYTES # BLD AUTO: 1.93 THOUSANDS/ÂΜL (ref 0.6–4.47)
LYMPHOCYTES NFR BLD AUTO: 32 % (ref 14–44)
MCH RBC QN AUTO: 28.6 PG (ref 26.8–34.3)
MCHC RBC AUTO-ENTMCNC: 31.9 G/DL (ref 31.4–37.4)
MCV RBC AUTO: 90 FL (ref 82–98)
MICROALBUMIN UR-MCNC: 7.4 MG/L
MICROALBUMIN/CREAT 24H UR: 7 MG/G CREATININE (ref 0–30)
MONOCYTES # BLD AUTO: 0.47 THOUSAND/ÂΜL (ref 0.17–1.22)
MONOCYTES NFR BLD AUTO: 8 % (ref 4–12)
NEUTROPHILS # BLD AUTO: 3.26 THOUSANDS/ÂΜL (ref 1.85–7.62)
NEUTS SEG NFR BLD AUTO: 54 % (ref 43–75)
NRBC BLD AUTO-RTO: 0 /100 WBCS
PLATELET # BLD AUTO: 268 THOUSANDS/UL (ref 149–390)
PMV BLD AUTO: 11.6 FL (ref 8.9–12.7)
POTASSIUM SERPL-SCNC: 4.3 MMOL/L (ref 3.5–5.3)
PROT SERPL-MCNC: 6.9 G/DL (ref 6.4–8.4)
RBC # BLD AUTO: 4.79 MILLION/UL (ref 3.81–5.12)
SODIUM SERPL-SCNC: 140 MMOL/L (ref 135–147)
TRIGL SERPL-MCNC: 158 MG/DL (ref ?–150)
TSH SERPL DL<=0.05 MIU/L-ACNC: 2.53 UIU/ML (ref 0.45–4.5)
WBC # BLD AUTO: 6.01 THOUSAND/UL (ref 4.31–10.16)

## 2025-06-23 PROCEDURE — 82570 ASSAY OF URINE CREATININE: CPT

## 2025-06-23 PROCEDURE — 36415 COLL VENOUS BLD VENIPUNCTURE: CPT

## 2025-06-23 PROCEDURE — 82306 VITAMIN D 25 HYDROXY: CPT

## 2025-06-23 PROCEDURE — 82043 UR ALBUMIN QUANTITATIVE: CPT

## 2025-06-23 PROCEDURE — 80053 COMPREHEN METABOLIC PANEL: CPT

## 2025-06-23 PROCEDURE — 80061 LIPID PANEL: CPT

## 2025-06-23 PROCEDURE — 85025 COMPLETE CBC W/AUTO DIFF WBC: CPT

## 2025-06-23 PROCEDURE — 84443 ASSAY THYROID STIM HORMONE: CPT

## 2025-07-08 ENCOUNTER — OFFICE VISIT (OUTPATIENT)
Dept: INTERNAL MEDICINE CLINIC | Facility: CLINIC | Age: 70
End: 2025-07-08

## 2025-07-08 VITALS
BODY MASS INDEX: 25.16 KG/M2 | SYSTOLIC BLOOD PRESSURE: 138 MMHG | TEMPERATURE: 98 F | OXYGEN SATURATION: 99 % | WEIGHT: 142 LBS | DIASTOLIC BLOOD PRESSURE: 82 MMHG | HEART RATE: 72 BPM | RESPIRATION RATE: 16 BRPM | HEIGHT: 63 IN

## 2025-07-08 DIAGNOSIS — E11.9 DIABETES MELLITUS TYPE 2 IN NONOBESE (HCC): Primary | ICD-10-CM

## 2025-07-08 DIAGNOSIS — M54.50 ACUTE LEFT-SIDED LOW BACK PAIN WITHOUT SCIATICA: ICD-10-CM

## 2025-07-08 LAB — SL AMB POCT HEMOGLOBIN AIC: 6.3 (ref ?–6.5)

## 2025-07-08 PROCEDURE — 83036 HEMOGLOBIN GLYCOSYLATED A1C: CPT | Performed by: STUDENT IN AN ORGANIZED HEALTH CARE EDUCATION/TRAINING PROGRAM

## 2025-07-08 PROCEDURE — 99215 OFFICE O/P EST HI 40 MIN: CPT | Performed by: STUDENT IN AN ORGANIZED HEALTH CARE EDUCATION/TRAINING PROGRAM

## 2025-07-08 RX ORDER — METHOCARBAMOL 500 MG/1
500 TABLET, FILM COATED ORAL 4 TIMES DAILY
Qty: 28 TABLET | Refills: 1 | Status: SHIPPED | OUTPATIENT
Start: 2025-07-08

## 2025-07-08 NOTE — PATIENT INSTRUCTIONS
Usa la crema de diclofenac 4 x daily entre la crema de lidocaina  Compra penetrex, acete de menta, salon pas y bengay, todos se vende por la ventana  Pastilla de antispasmos  Vaya a la clinica gratis

## 2025-07-08 NOTE — PROGRESS NOTES
Name: Hue Short      : 1955      MRN: [id number]  Encounter Provider: Dory Man DO  Encounter Date: 2025   Encounter department: [facility]    Assessment & Plan  Diabetes mellitus type 2 in nonobese (HCC)    Lab Results   Component Value Date    HGBA1C 6.3 2025     Currently at goal for A1c (<7%, <8% if >80)  Continue current regimen of diet control  Refuses Ace-i/arb  refuses statin            repeat lipids q3yrs, due    Educated patient regarding risks, benefits, alternatives, and side effects    Despite education, patient refuses  intermittently following optho, next eye exam due asap  not following podiatry, next foot exam due    Completed in office today  yearly micro albumin creatinine ratio is UTD, due   Carb controlled diet, discussed healthy lifestyle modifications  consider DM nutrition referral    Complications: - CKD, ? retinopathy, - neuropathy, - erectile dysfunction, + PAD     Orders:    POCT hemoglobin A1c    methocarbamol (ROBAXIN) 500 mg tablet; Take 1 tablet (500 mg total) by mouth 4 (four) times a day    Acute left-sided low back pain without sciatica  Xrays not indicated, though if persistent despite conservative measures, may consider r/o fracture  Encouraged PT, patient declines official referral   Again, given free clinic information  Encouraged exercise as tolerated, particularly walking  Short course of robaxin   Encouraged plenty of fluids and ice pack/heat packs in addition to peppermint oil for muscle relaxation  no work note provided, no work accommodations  Pt declines Short course of naprosen  No reg flag symptoms at this time  Discussed red flag symptoms of bowel and bladder incontinence, which would require immediate ED visit  Will likely need MRI if no improvement 6 weeks with consistent conservative treatment  Med rec completed and compliant with regimen  Consider Arthritis workup if refractory   May consider  sacroiliitis HLA b27 testing in future based on imaging or clinical course  Patient declined referral to comprehensive spine at this time                History of Present Illness     mild intermittent asthma, depression, HLD, lumbar radiculopathy vs sciatica who presents today for f/u L sided hip/flank pain.    Last office visit for similar complaints and advised conservative care and PT.    Worse when lying down, does not get worse when lying on side necessarily; just tossing and turning because cannot get comfortable  Does wear supportive shoes/sneakers nearly all day every day    Is taking gabapentin BID as does not want to take many pills    Taking ibuprofen daily, but doesn't like to take pills, looking to decrease current pill regimen; NSIADs bring down to 70%, uses gym's heated massage, ice packs which help the most, almost completely relieving pain, although temporary (couple of hours)    Has not gone to PT clinic    No red flag symptoms      Review of Systems   Constitutional:  Negative for chills, fatigue and fever.   HENT:  Negative for congestion.    Eyes:  Negative for visual disturbance.   Respiratory:  Negative for cough and shortness of breath.    Cardiovascular:  Negative for chest pain and palpitations.   Gastrointestinal:  Negative for abdominal pain, constipation, diarrhea, nausea and vomiting.   Endocrine: Negative for polydipsia, polyphagia and polyuria.   Musculoskeletal:  Positive for back pain (L thoracic).   Neurological:  Negative for dizziness, syncope, light-headedness and headaches.   Psychiatric/Behavioral:  Negative for dysphoric mood and sleep disturbance. The patient is not nervous/anxious.      Past Medical History[1]  Past Surgical History[2]  Family History[3]  Social History[4]  Medications[5]  No Known Allergies  Immunization History   Administered Date(s) Administered    COVID-19 MODERNA VACC 0.5 ML IM 03/29/2021, 04/30/2021    INFLUENZA 11/23/2013, 05/13/2014, 03/04/2016,  "03/16/2018, 01/04/2019, 12/06/2019    Influenza Split High Dose Preservative Free IM 03/25/2025    Influenza, high dose seasonal 0.7 mL 11/05/2021    Influenza, injectable, quadrivalent, preservative free 0.5 mL 09/18/2020    Pneumococcal Polysaccharide PPV23 01/30/2020    Tdap 04/01/2011, 12/06/2019    Tetanus Immune Globulin 04/01/2011     Objective   /82 (BP Location: Right arm, Patient Position: Sitting, Cuff Size: Standard)   Pulse 72   Temp 98 °F (36.7 °C) (Temporal)   Resp 16   Ht 5' 3\" (1.6 m)   Wt 64.4 kg (142 lb)   SpO2 99%   BMI 25.15 kg/m²     Physical Exam  Constitutional:       General: She is not in acute distress.     Appearance: She is obese. She is not ill-appearing.   HENT:      Mouth/Throat:      Mouth: Mucous membranes are moist.     Eyes:      General: No scleral icterus.     Conjunctiva/sclera: Conjunctivae normal.       Cardiovascular:      Rate and Rhythm: Normal rate and regular rhythm.      Pulses: no weak pulses.           Dorsalis pedis pulses are 2+ on the right side and 2+ on the left side.      Heart sounds: Normal heart sounds. No murmur heard.     No friction rub. No gallop.   Pulmonary:      Effort: Pulmonary effort is normal.      Breath sounds: Normal breath sounds. No wheezing, rhonchi or rales.   Abdominal:      General: Bowel sounds are normal.      Palpations: Abdomen is soft.      Tenderness: There is no abdominal tenderness. There is no guarding.     Musculoskeletal:         General: Tenderness (to palpation t10 L paraspinal area, ++ hypertonicity) present.      Right lower leg: No edema.      Left lower leg: No edema.      Comments: Decreased active ROM 2/2 pain, passive ROM WNL   Feet:      Right foot:      Skin integrity: No ulcer, skin breakdown, erythema, warmth, callus or dry skin.      Left foot:      Skin integrity: No ulcer, skin breakdown, erythema, warmth, callus or dry skin.     Skin:     General: Skin is warm.      Findings: No lesion. "     Neurological:      Mental Status: She is alert.     Psychiatric:         Mood and Affect: Mood normal.         Behavior: Behavior normal.         Thought Content: Thought content normal.         Judgment: Judgment normal.       Diabetic Foot Exam    Patient's shoes and socks removed.    Right Foot/Ankle   Right Foot Inspection  Skin Exam: skin normal and skin intact. No dry skin, no warmth, no callus, no erythema, no maceration, no abnormal color, no pre-ulcer, no ulcer and no callus.     Toe Exam: ROM and strength within normal limits.     Sensory   Monofilament testing: intact    Vascular  Capillary refills: < 3 seconds  The right DP pulse is 2+.     Left Foot/Ankle  Left Foot Inspection  Skin Exam: skin normal and skin intact. No dry skin, no warmth, no erythema, no maceration, normal color, no pre-ulcer, no ulcer and no callus.     Toe Exam: ROM and strength within normal limits.     Sensory   Monofilament testing: intact    Vascular  Capillary refills: < 3 seconds  The left DP pulse is 2+.     Assign Risk Category  No deformity present  No loss of protective sensation  No weak pulses  Risk: 0    I have spent a total time of 40 minutes in caring for this patient on the day of the visit/encounter including Diagnostic results, Prognosis, Risks and benefits of tx options, Instructions for management, Patient and family education, Importance of tx compliance, Risk factor reductions, Impressions, Counseling / Coordination of care, Documenting in the medical record, Reviewing/placing orders in the medical record (including tests, medications, and/or procedures), Obtaining or reviewing history  , and Communicating with other healthcare professionals          [1]   Past Medical History:  Diagnosis Date    Asthma     Back pain     Bowel incontinence     Chronic pain     Diabetes mellitus (HCC)     Diverticulosis of large intestine 02/06/2019    Dx by colonoscopy in 2017. Due for repeat colonoscopy in 2022.        History of breast implant     Psychiatric disorder     panic attacks    Sciatica of right side    [2]   Past Surgical History:  Procedure Laterality Date    AUGMENTATION MAMMAPLASTY      patient denies actual implant    she had a bilateral breast lift    BREAST SURGERY      lift    [facility] COLONOSCOPY FLX DX W/COLLJ SPEC WHEN PFRMD N/A 05/11/2017    Procedure: COLONOSCOPY;  Surgeon: Dov Martins MD;  Location:  GI LAB;  Service: Gastroenterology   [3]   Family History  Problem Relation Name Age of Onset    Lung cancer Mother      Asthma Sister      No Known Problems Sister      No Known Problems Sister      No Known Problems Sister      No Known Problems Maternal Grandmother      No Known Problems Maternal Grandfather      No Known Problems Paternal Grandmother      No Known Problems Paternal Grandfather      No Known Problems Daughter      No Known Problems Daughter      No Known Problems Son      No Known Problems Son      No Known Problems Maternal Aunt      No Known Problems Maternal Aunt      No Known Problems Maternal Aunt      No Known Problems Paternal Aunt      No Known Problems Paternal Aunt      No Known Problems Paternal Aunt      No Known Problems Paternal Aunt      Heart attack Neg Hx      Stroke Neg Hx     [4]   Social History  Tobacco Use    Smoking status: Never    Smokeless tobacco: Never   Vaping Use    Vaping status: Never Used   Substance and Sexual Activity    Alcohol use: No    Drug use: No    Sexual activity: Not Currently   [5]   Current Outpatient Medications on File Prior to Visit   Medication Sig    albuterol (Ventolin HFA) 90 mcg/act inhaler Inhale 2 puffs every 6 (six) hours as needed for wheezing    gabapentin (NEURONTIN) 100 mg capsule 1 po am , pm and hs if ineffective 2 po am , pm and hs for back leg pain    ketorolac (TORADOL) 10 mg tablet Take 10 mg by mouth every 6 (six) hours as needed (Patient not taking: Reported on 4/10/2025)    traMADol (ULTRAM) 50 mg tablet Take 50 mg by  mouth every 6 (six) hours as needed for moderate pain (Patient not taking: Reported on 4/10/2025)    [DISCONTINUED] atorvastatin (LIPITOR) 40 mg tablet Take 1 tablet (40 mg total) by mouth every evening    [DISCONTINUED] predniSONE 20 mg tablet  (Patient not taking: Reported on 4/10/2025)

## 2025-07-08 NOTE — ASSESSMENT & PLAN NOTE
Lab Results   Component Value Date    HGBA1C 6.3 07/08/2025     Currently at goal for A1c (<7%, <8% if >80)  Continue current regimen of diet control  Refuses Ace-i/arb  refuses statin            repeat lipids q3yrs, due 2027   Educated patient regarding risks, benefits, alternatives, and side effects    Despite education, patient refuses  intermittently following optho, next eye exam due asap  not following podiatry, next foot exam due 2026   Completed in office today  yearly micro albumin creatinine ratio is UTD, due 2026  Carb controlled diet, discussed healthy lifestyle modifications  consider DM nutrition referral    Complications: - CKD, ? retinopathy, - neuropathy, - erectile dysfunction, + PAD     Orders:    POCT hemoglobin A1c    methocarbamol (ROBAXIN) 500 mg tablet; Take 1 tablet (500 mg total) by mouth 4 (four) times a day

## 2025-07-09 NOTE — ASSESSMENT & PLAN NOTE
Xrays not indicated, though if persistent despite conservative measures, may consider r/o fracture  Encouraged PT, patient declines official referral   Again, given free clinic information  Encouraged exercise as tolerated, particularly walking  Short course of robaxin   Encouraged plenty of fluids and ice pack/heat packs in addition to peppermint oil for muscle relaxation  no work note provided, no work accommodations  Pt declines Short course of naprosen  No reg flag symptoms at this time  Discussed red flag symptoms of bowel and bladder incontinence, which would require immediate ED visit  Will likely need MRI if no improvement 6 weeks with consistent conservative treatment  Med rec completed and compliant with regimen  Consider Arthritis workup if refractory   May consider sacroiliitis HLA b27 testing in future based on imaging or clinical course  Patient declined referral to comprehensive spine at this time